# Patient Record
Sex: FEMALE | Race: WHITE | NOT HISPANIC OR LATINO | Employment: OTHER | URBAN - METROPOLITAN AREA
[De-identification: names, ages, dates, MRNs, and addresses within clinical notes are randomized per-mention and may not be internally consistent; named-entity substitution may affect disease eponyms.]

---

## 2017-04-19 ENCOUNTER — APPOINTMENT (EMERGENCY)
Dept: RADIOLOGY | Facility: HOSPITAL | Age: 77
End: 2017-04-19
Payer: MEDICARE

## 2017-04-19 ENCOUNTER — HOSPITAL ENCOUNTER (EMERGENCY)
Facility: HOSPITAL | Age: 77
Discharge: HOME | End: 2017-04-19
Admitting: EMERGENCY MEDICINE
Payer: MEDICARE

## 2017-04-19 VITALS
DIASTOLIC BLOOD PRESSURE: 109 MMHG | HEIGHT: 66 IN | HEART RATE: 85 BPM | WEIGHT: 192 LBS | SYSTOLIC BLOOD PRESSURE: 183 MMHG | BODY MASS INDEX: 30.86 KG/M2 | OXYGEN SATURATION: 100 % | TEMPERATURE: 97.5 F | RESPIRATION RATE: 15 BRPM

## 2017-04-19 DIAGNOSIS — S93.402A SPRAIN OF LEFT ANKLE: ICD-10-CM

## 2017-04-19 DIAGNOSIS — S92.252A: Primary | ICD-10-CM

## 2017-04-19 PROCEDURE — 99283 EMERGENCY DEPT VISIT LOW MDM: CPT

## 2017-04-19 PROCEDURE — 73610 X-RAY EXAM OF ANKLE: CPT

## 2017-04-19 RX ORDER — METOPROLOL TARTRATE 50 MG/1
50 TABLET, FILM COATED ORAL 2 TIMES DAILY
Status: ON HOLD | COMMUNITY
End: 2021-06-27 | Stop reason: ALTCHOICE

## 2017-04-19 RX ORDER — HYDROCODONE BITARTRATE AND ACETAMINOPHEN 5; 325 MG/1; MG/1
1 TABLET ORAL EVERY 4 HOURS PRN
Qty: 15 TABLET | Refills: 0 | Status: ON HOLD | OUTPATIENT
Start: 2017-04-19 | End: 2021-06-27 | Stop reason: ALTCHOICE

## 2017-04-20 ENCOUNTER — ALLSCRIPTS OFFICE VISIT (OUTPATIENT)
Dept: OTHER | Facility: OTHER | Age: 77
End: 2017-04-20

## 2017-05-02 ENCOUNTER — HOSPITAL ENCOUNTER (OUTPATIENT)
Dept: RADIOLOGY | Facility: CLINIC | Age: 77
Discharge: HOME | End: 2017-05-02
Payer: MEDICARE

## 2017-05-02 ENCOUNTER — ALLSCRIPTS OFFICE VISIT (OUTPATIENT)
Dept: OTHER | Facility: OTHER | Age: 77
End: 2017-05-02

## 2017-05-02 DIAGNOSIS — S92.255A CLOSED NONDISPLACED FRACTURE OF NAVICULAR BONE OF LEFT FOOT: ICD-10-CM

## 2017-05-02 PROCEDURE — 73630 X-RAY EXAM OF FOOT: CPT

## 2018-01-12 VITALS
BODY MASS INDEX: 32.14 KG/M2 | HEIGHT: 66 IN | HEART RATE: 86 BPM | SYSTOLIC BLOOD PRESSURE: 127 MMHG | DIASTOLIC BLOOD PRESSURE: 86 MMHG | WEIGHT: 200 LBS

## 2018-01-13 VITALS
DIASTOLIC BLOOD PRESSURE: 84 MMHG | HEART RATE: 96 BPM | BODY MASS INDEX: 31.34 KG/M2 | WEIGHT: 195 LBS | SYSTOLIC BLOOD PRESSURE: 125 MMHG | HEIGHT: 66 IN

## 2020-05-29 ENCOUNTER — APPOINTMENT (OUTPATIENT)
Dept: LAB | Facility: HOSPITAL | Age: 80
End: 2020-05-29
Attending: FAMILY MEDICINE
Payer: COMMERCIAL

## 2020-05-29 ENCOUNTER — TRANSCRIBE ORDERS (OUTPATIENT)
Dept: ADMINISTRATIVE | Facility: HOSPITAL | Age: 80
End: 2020-05-29

## 2020-05-29 DIAGNOSIS — Z13.9 SCREENING FOR UNSPECIFIED CONDITION: ICD-10-CM

## 2020-05-29 DIAGNOSIS — E13.69 OTHER SPECIFIED DIABETES MELLITUS WITH OTHER SPECIFIED COMPLICATION, UNSPECIFIED WHETHER LONG TERM INSULIN USE (HCC): ICD-10-CM

## 2020-05-29 DIAGNOSIS — E78.5 HYPERLIPIDEMIA, UNSPECIFIED HYPERLIPIDEMIA TYPE: ICD-10-CM

## 2020-05-29 DIAGNOSIS — I10 ESSENTIAL HYPERTENSION, MALIGNANT: ICD-10-CM

## 2020-05-29 DIAGNOSIS — I48.91 ATRIAL FIBRILLATION, UNSPECIFIED TYPE (HCC): ICD-10-CM

## 2020-05-29 DIAGNOSIS — E66.01 MORBID OBESITY (HCC): ICD-10-CM

## 2020-05-29 DIAGNOSIS — E13.69 OTHER SPECIFIED DIABETES MELLITUS WITH OTHER SPECIFIED COMPLICATION, UNSPECIFIED WHETHER LONG TERM INSULIN USE (HCC): Primary | ICD-10-CM

## 2020-05-29 LAB
ALBUMIN SERPL BCP-MCNC: 3.5 G/DL (ref 3.5–5)
ALP SERPL-CCNC: 36 U/L (ref 46–116)
ALT SERPL W P-5'-P-CCNC: 25 U/L (ref 12–78)
ANION GAP SERPL CALCULATED.3IONS-SCNC: 6 MMOL/L (ref 4–13)
AST SERPL W P-5'-P-CCNC: 12 U/L (ref 5–45)
BILIRUB SERPL-MCNC: 0.74 MG/DL (ref 0.2–1)
BUN SERPL-MCNC: 14 MG/DL (ref 5–25)
CALCIUM SERPL-MCNC: 8.7 MG/DL (ref 8.3–10.1)
CHLORIDE SERPL-SCNC: 105 MMOL/L (ref 100–108)
CHOLEST SERPL-MCNC: 176 MG/DL (ref 50–200)
CO2 SERPL-SCNC: 28 MMOL/L (ref 21–32)
CREAT SERPL-MCNC: 0.73 MG/DL (ref 0.6–1.3)
GFR SERPL CREATININE-BSD FRML MDRD: 78 ML/MIN/1.73SQ M
GLUCOSE P FAST SERPL-MCNC: 152 MG/DL (ref 65–99)
HDLC SERPL-MCNC: 41 MG/DL
LDLC SERPL CALC-MCNC: 114 MG/DL (ref 0–100)
NONHDLC SERPL-MCNC: 135 MG/DL
POTASSIUM SERPL-SCNC: 3.9 MMOL/L (ref 3.5–5.3)
PROT SERPL-MCNC: 6.6 G/DL (ref 6.4–8.2)
SODIUM SERPL-SCNC: 139 MMOL/L (ref 136–145)
TRIGL SERPL-MCNC: 104 MG/DL

## 2020-05-29 PROCEDURE — 83036 HEMOGLOBIN GLYCOSYLATED A1C: CPT

## 2020-05-29 PROCEDURE — 36415 COLL VENOUS BLD VENIPUNCTURE: CPT

## 2020-05-29 PROCEDURE — 80061 LIPID PANEL: CPT

## 2020-05-29 PROCEDURE — 80053 COMPREHEN METABOLIC PANEL: CPT

## 2020-05-30 LAB
EST. AVERAGE GLUCOSE BLD GHB EST-MCNC: 143 MG/DL
HBA1C MFR BLD: 6.6 %

## 2020-12-16 ENCOUNTER — TRANSCRIBE ORDERS (OUTPATIENT)
Dept: ADMINISTRATIVE | Facility: HOSPITAL | Age: 80
End: 2020-12-16

## 2020-12-16 ENCOUNTER — APPOINTMENT (OUTPATIENT)
Dept: LAB | Facility: CLINIC | Age: 80
End: 2020-12-16
Payer: COMMERCIAL

## 2020-12-16 DIAGNOSIS — E11.9 DIABETES MELLITUS, STABLE (HCC): ICD-10-CM

## 2020-12-16 DIAGNOSIS — E11.9 DIABETES MELLITUS, STABLE (HCC): Primary | ICD-10-CM

## 2020-12-16 DIAGNOSIS — E78.2 MIXED HYPERLIPIDEMIA: ICD-10-CM

## 2020-12-16 DIAGNOSIS — I48.91 ATRIAL FIBRILLATION, UNSPECIFIED TYPE (HCC): ICD-10-CM

## 2020-12-16 LAB
ALBUMIN SERPL BCP-MCNC: 3.7 G/DL (ref 3.5–5)
ALP SERPL-CCNC: 43 U/L (ref 46–116)
ALT SERPL W P-5'-P-CCNC: 30 U/L (ref 12–78)
ANION GAP SERPL CALCULATED.3IONS-SCNC: 6 MMOL/L (ref 4–13)
AST SERPL W P-5'-P-CCNC: 13 U/L (ref 5–45)
BILIRUB SERPL-MCNC: 0.69 MG/DL (ref 0.2–1)
BUN SERPL-MCNC: 12 MG/DL (ref 5–25)
CALCIUM SERPL-MCNC: 9.5 MG/DL (ref 8.3–10.1)
CHLORIDE SERPL-SCNC: 106 MMOL/L (ref 100–108)
CHOLEST SERPL-MCNC: 179 MG/DL (ref 50–200)
CO2 SERPL-SCNC: 28 MMOL/L (ref 21–32)
CREAT SERPL-MCNC: 0.77 MG/DL (ref 0.6–1.3)
CREAT UR-MCNC: 169 MG/DL
EST. AVERAGE GLUCOSE BLD GHB EST-MCNC: 160 MG/DL
GFR SERPL CREATININE-BSD FRML MDRD: 73 ML/MIN/1.73SQ M
GLUCOSE P FAST SERPL-MCNC: 190 MG/DL (ref 65–99)
HBA1C MFR BLD: 7.2 %
HDLC SERPL-MCNC: 41 MG/DL
LDLC SERPL CALC-MCNC: 113 MG/DL (ref 0–100)
MICROALBUMIN UR-MCNC: 17.4 MG/L (ref 0–20)
MICROALBUMIN/CREAT 24H UR: 10 MG/G CREATININE (ref 0–30)
NONHDLC SERPL-MCNC: 138 MG/DL
POTASSIUM SERPL-SCNC: 3.9 MMOL/L (ref 3.5–5.3)
PROT SERPL-MCNC: 6.8 G/DL (ref 6.4–8.2)
SODIUM SERPL-SCNC: 140 MMOL/L (ref 136–145)
TRIGL SERPL-MCNC: 123 MG/DL

## 2020-12-16 PROCEDURE — 36415 COLL VENOUS BLD VENIPUNCTURE: CPT

## 2020-12-16 PROCEDURE — 82043 UR ALBUMIN QUANTITATIVE: CPT | Performed by: FAMILY MEDICINE

## 2020-12-16 PROCEDURE — 80053 COMPREHEN METABOLIC PANEL: CPT

## 2020-12-16 PROCEDURE — 80061 LIPID PANEL: CPT

## 2020-12-16 PROCEDURE — 83036 HEMOGLOBIN GLYCOSYLATED A1C: CPT

## 2020-12-16 PROCEDURE — 82570 ASSAY OF URINE CREATININE: CPT | Performed by: FAMILY MEDICINE

## 2021-02-05 ENCOUNTER — IMMUNIZATIONS (OUTPATIENT)
Dept: FAMILY MEDICINE CLINIC | Facility: HOSPITAL | Age: 81
End: 2021-02-05

## 2021-02-05 DIAGNOSIS — Z23 ENCOUNTER FOR IMMUNIZATION: Primary | ICD-10-CM

## 2021-02-05 PROCEDURE — 0011A SARS-COV-2 / COVID-19 MRNA VACCINE (MODERNA) 100 MCG: CPT

## 2021-02-05 PROCEDURE — 91301 SARS-COV-2 / COVID-19 MRNA VACCINE (MODERNA) 100 MCG: CPT

## 2021-03-04 ENCOUNTER — IMMUNIZATIONS (OUTPATIENT)
Dept: FAMILY MEDICINE CLINIC | Facility: HOSPITAL | Age: 81
End: 2021-03-04

## 2021-03-04 DIAGNOSIS — Z23 ENCOUNTER FOR IMMUNIZATION: Primary | ICD-10-CM

## 2021-03-04 PROCEDURE — 91301 SARS-COV-2 / COVID-19 MRNA VACCINE (MODERNA) 100 MCG: CPT

## 2021-03-04 PROCEDURE — 0012A SARS-COV-2 / COVID-19 MRNA VACCINE (MODERNA) 100 MCG: CPT

## 2021-06-27 ENCOUNTER — HOSPITAL ENCOUNTER (OUTPATIENT)
Facility: HOSPITAL | Age: 81
Setting detail: OBSERVATION
Discharge: HOME | End: 2021-06-28
Attending: EMERGENCY MEDICINE | Admitting: INTERNAL MEDICINE
Payer: MEDICARE

## 2021-06-27 ENCOUNTER — APPOINTMENT (OUTPATIENT)
Dept: RADIOLOGY | Facility: HOSPITAL | Age: 81
End: 2021-06-27
Payer: MEDICARE

## 2021-06-27 DIAGNOSIS — R04.0 RIGHT-SIDED EPISTAXIS: ICD-10-CM

## 2021-06-27 DIAGNOSIS — I16.0 HYPERTENSIVE URGENCY: ICD-10-CM

## 2021-06-27 DIAGNOSIS — R04.0 EPISTAXIS: ICD-10-CM

## 2021-06-27 DIAGNOSIS — E11.9 TYPE 2 DIABETES MELLITUS (HCC): ICD-10-CM

## 2021-06-27 DIAGNOSIS — I48.91 ATRIAL FIBRILLATION WITH RAPID VENTRICULAR RESPONSE (HCC): Primary | ICD-10-CM

## 2021-06-27 LAB
ANION GAP SERPL CALCULATED.3IONS-SCNC: 11 MMOL/L (ref 4–13)
BACTERIA UR QL AUTO: NORMAL /HPF
BASOPHILS # BLD AUTO: 0.03 THOUSANDS/ΜL (ref 0–0.1)
BASOPHILS NFR BLD AUTO: 1 % (ref 0–1)
BILIRUB UR QL STRIP: NEGATIVE
BUN SERPL-MCNC: 18 MG/DL (ref 5–25)
CALCIUM SERPL-MCNC: 9 MG/DL (ref 8.3–10.1)
CHLORIDE SERPL-SCNC: 104 MMOL/L (ref 100–108)
CLARITY UR: CLEAR
CO2 SERPL-SCNC: 27 MMOL/L (ref 21–32)
COLOR UR: YELLOW
CREAT SERPL-MCNC: 0.84 MG/DL (ref 0.6–1.3)
EOSINOPHIL # BLD AUTO: 0.19 THOUSAND/ΜL (ref 0–0.61)
EOSINOPHIL NFR BLD AUTO: 4 % (ref 0–6)
ERYTHROCYTE [DISTWIDTH] IN BLOOD BY AUTOMATED COUNT: 12.9 % (ref 11.6–15.1)
GFR SERPL CREATININE-BSD FRML MDRD: 65 ML/MIN/1.73SQ M
GLUCOSE SERPL-MCNC: 315 MG/DL (ref 65–140)
GLUCOSE UR STRIP-MCNC: ABNORMAL MG/DL
HCT VFR BLD AUTO: 42.4 % (ref 34.8–46.1)
HGB BLD-MCNC: 14.4 G/DL (ref 11.5–15.4)
HGB UR QL STRIP.AUTO: NEGATIVE
IMM GRANULOCYTES # BLD AUTO: 0.02 THOUSAND/UL (ref 0–0.2)
IMM GRANULOCYTES NFR BLD AUTO: 0 % (ref 0–2)
INR PPP: 1.56 (ref 0.84–1.19)
KETONES UR STRIP-MCNC: ABNORMAL MG/DL
LEUKOCYTE ESTERASE UR QL STRIP: ABNORMAL
LYMPHOCYTES # BLD AUTO: 1.24 THOUSANDS/ΜL (ref 0.6–4.47)
LYMPHOCYTES NFR BLD AUTO: 24 % (ref 14–44)
MCH RBC QN AUTO: 30.3 PG (ref 26.8–34.3)
MCHC RBC AUTO-ENTMCNC: 34 G/DL (ref 31.4–37.4)
MCV RBC AUTO: 89 FL (ref 82–98)
MONOCYTES # BLD AUTO: 0.55 THOUSAND/ΜL (ref 0.17–1.22)
MONOCYTES NFR BLD AUTO: 11 % (ref 4–12)
NEUTROPHILS # BLD AUTO: 3.15 THOUSANDS/ΜL (ref 1.85–7.62)
NEUTS SEG NFR BLD AUTO: 60 % (ref 43–75)
NITRITE UR QL STRIP: NEGATIVE
NON-SQ EPI CELLS URNS QL MICRO: NORMAL /HPF
NRBC BLD AUTO-RTO: 0 /100 WBCS
PH UR STRIP.AUTO: 7 [PH]
PLATELET # BLD AUTO: 197 THOUSANDS/UL (ref 149–390)
PMV BLD AUTO: 9.9 FL (ref 8.9–12.7)
POTASSIUM SERPL-SCNC: 4 MMOL/L (ref 3.5–5.3)
PROT UR STRIP-MCNC: NEGATIVE MG/DL
PROTHROMBIN TIME: 18.6 SECONDS (ref 11.6–14.5)
RBC # BLD AUTO: 4.75 MILLION/UL (ref 3.81–5.12)
RBC #/AREA URNS AUTO: NORMAL /HPF
SODIUM SERPL-SCNC: 142 MMOL/L (ref 136–145)
SP GR UR STRIP.AUTO: 1.01 (ref 1–1.03)
TROPONIN I SERPL-MCNC: <0.02 NG/ML
TROPONIN I SERPL-MCNC: <0.02 NG/ML
TSH SERPL DL<=0.05 MIU/L-ACNC: 2.93 UIU/ML (ref 0.36–3.74)
UROBILINOGEN UR QL STRIP.AUTO: 0.2 E.U./DL
WBC # BLD AUTO: 5.18 THOUSAND/UL (ref 4.31–10.16)
WBC #/AREA URNS AUTO: NORMAL /HPF

## 2021-06-27 PROCEDURE — 85610 PROTHROMBIN TIME: CPT | Performed by: EMERGENCY MEDICINE

## 2021-06-27 PROCEDURE — 93005 ELECTROCARDIOGRAM TRACING: CPT

## 2021-06-27 PROCEDURE — 36415 COLL VENOUS BLD VENIPUNCTURE: CPT | Performed by: EMERGENCY MEDICINE

## 2021-06-27 PROCEDURE — 94664 DEMO&/EVAL PT USE INHALER: CPT

## 2021-06-27 PROCEDURE — 84484 ASSAY OF TROPONIN QUANT: CPT | Performed by: EMERGENCY MEDICINE

## 2021-06-27 PROCEDURE — 99284 EMERGENCY DEPT VISIT MOD MDM: CPT

## 2021-06-27 PROCEDURE — 84443 ASSAY THYROID STIM HORMONE: CPT | Performed by: INTERNAL MEDICINE

## 2021-06-27 PROCEDURE — 81001 URINALYSIS AUTO W/SCOPE: CPT | Performed by: INTERNAL MEDICINE

## 2021-06-27 PROCEDURE — 94760 N-INVAS EAR/PLS OXIMETRY 1: CPT

## 2021-06-27 PROCEDURE — 99222 1ST HOSP IP/OBS MODERATE 55: CPT | Performed by: INTERNAL MEDICINE

## 2021-06-27 PROCEDURE — 80048 BASIC METABOLIC PNL TOTAL CA: CPT | Performed by: EMERGENCY MEDICINE

## 2021-06-27 PROCEDURE — 85025 COMPLETE CBC W/AUTO DIFF WBC: CPT | Performed by: EMERGENCY MEDICINE

## 2021-06-27 PROCEDURE — 71045 X-RAY EXAM CHEST 1 VIEW: CPT

## 2021-06-27 PROCEDURE — 99220 PR INITIAL OBSERVATION CARE/DAY 70 MINUTES: CPT | Performed by: INTERNAL MEDICINE

## 2021-06-27 PROCEDURE — 84484 ASSAY OF TROPONIN QUANT: CPT | Performed by: INTERNAL MEDICINE

## 2021-06-27 PROCEDURE — 96374 THER/PROPH/DIAG INJ IV PUSH: CPT

## 2021-06-27 RX ORDER — LABETALOL 20 MG/4 ML (5 MG/ML) INTRAVENOUS SYRINGE
10 EVERY 6 HOURS PRN
Status: DISCONTINUED | OUTPATIENT
Start: 2021-06-27 | End: 2021-06-28 | Stop reason: HOSPADM

## 2021-06-27 RX ORDER — ONDANSETRON 2 MG/ML
4 INJECTION INTRAMUSCULAR; INTRAVENOUS EVERY 6 HOURS PRN
Status: DISCONTINUED | OUTPATIENT
Start: 2021-06-27 | End: 2021-06-28 | Stop reason: HOSPADM

## 2021-06-27 RX ORDER — LISINOPRIL 10 MG/1
10 TABLET ORAL DAILY
Status: DISCONTINUED | OUTPATIENT
Start: 2021-06-27 | End: 2021-06-28 | Stop reason: HOSPADM

## 2021-06-27 RX ORDER — TRANEXAMIC ACID 100 MG/ML
1000 INJECTION, SOLUTION INTRAVENOUS ONCE
Status: COMPLETED | OUTPATIENT
Start: 2021-06-27 | End: 2021-06-27

## 2021-06-27 RX ORDER — DILTIAZEM HYDROCHLORIDE 5 MG/ML
20 INJECTION INTRAVENOUS ONCE
Status: COMPLETED | OUTPATIENT
Start: 2021-06-27 | End: 2021-06-27

## 2021-06-27 RX ORDER — SOTALOL HYDROCHLORIDE 80 MG/1
40 TABLET ORAL 2 TIMES DAILY
Status: DISCONTINUED | OUTPATIENT
Start: 2021-06-27 | End: 2021-06-27

## 2021-06-27 RX ORDER — HYDROCHLOROTHIAZIDE 12.5 MG/1
12.5 TABLET ORAL DAILY
COMMUNITY
End: 2021-09-08

## 2021-06-27 RX ORDER — SOTALOL HYDROCHLORIDE 80 MG/1
80 TABLET ORAL 2 TIMES DAILY
Status: DISCONTINUED | OUTPATIENT
Start: 2021-06-27 | End: 2021-06-28 | Stop reason: HOSPADM

## 2021-06-27 RX ORDER — OXYMETAZOLINE HYDROCHLORIDE 0.05 G/100ML
2 SPRAY NASAL ONCE
Status: COMPLETED | OUTPATIENT
Start: 2021-06-27 | End: 2021-06-27

## 2021-06-27 RX ORDER — LISINOPRIL 20 MG/1
20 TABLET ORAL DAILY
COMMUNITY
End: 2021-11-05 | Stop reason: SDUPTHER

## 2021-06-27 RX ORDER — ACETAMINOPHEN 325 MG/1
650 TABLET ORAL EVERY 6 HOURS PRN
Status: DISCONTINUED | OUTPATIENT
Start: 2021-06-27 | End: 2021-06-28 | Stop reason: HOSPADM

## 2021-06-27 RX ORDER — LIDOCAINE HYDROCHLORIDE AND EPINEPHRINE 10; 10 MG/ML; UG/ML
20 INJECTION, SOLUTION INFILTRATION; PERINEURAL ONCE
Status: COMPLETED | OUTPATIENT
Start: 2021-06-27 | End: 2021-06-27

## 2021-06-27 RX ORDER — SOTALOL HYDROCHLORIDE 80 MG/1
40 TABLET ORAL 2 TIMES DAILY
Status: ON HOLD | COMMUNITY
End: 2021-06-28 | Stop reason: SDUPTHER

## 2021-06-27 RX ORDER — HYDROCHLOROTHIAZIDE 12.5 MG/1
12.5 TABLET ORAL DAILY
Status: DISCONTINUED | OUTPATIENT
Start: 2021-06-27 | End: 2021-06-28 | Stop reason: HOSPADM

## 2021-06-27 RX ADMIN — LABETALOL 20 MG/4 ML (5 MG/ML) INTRAVENOUS SYRINGE 10 MG: at 23:47

## 2021-06-27 RX ADMIN — DILTIAZEM HYDROCHLORIDE 30 MG: 30 TABLET, FILM COATED ORAL at 14:38

## 2021-06-27 RX ADMIN — SOTALOL HYDROCHLORIDE 80 MG: 80 TABLET ORAL at 18:05

## 2021-06-27 RX ADMIN — SOTALOL HYDROCHLORIDE 40 MG: 80 TABLET ORAL at 11:36

## 2021-06-27 RX ADMIN — DILTIAZEM HYDROCHLORIDE 30 MG: 30 TABLET, FILM COATED ORAL at 18:05

## 2021-06-27 RX ADMIN — DILTIAZEM HYDROCHLORIDE 20 MG: 5 INJECTION INTRAVENOUS at 08:21

## 2021-06-27 RX ADMIN — TRANEXAMIC ACID 1000 MG: 1 INJECTION, SOLUTION INTRAVENOUS at 07:46

## 2021-06-27 RX ADMIN — DILTIAZEM HYDROCHLORIDE 30 MG: 30 TABLET, FILM COATED ORAL at 23:40

## 2021-06-27 RX ADMIN — LISINOPRIL 10 MG: 10 TABLET ORAL at 11:36

## 2021-06-27 RX ADMIN — OXYMETAZOLINE HYDROCHLORIDE 2 SPRAY: 0.05 SPRAY NASAL at 07:46

## 2021-06-27 RX ADMIN — HYDROCHLOROTHIAZIDE 12.5 MG: 12.5 TABLET ORAL at 11:36

## 2021-06-27 RX ADMIN — LABETALOL 20 MG/4 ML (5 MG/ML) INTRAVENOUS SYRINGE 10 MG: at 11:36

## 2021-06-27 RX ADMIN — LIDOCAINE HYDROCHLORIDE,EPINEPHRINE BITARTRATE 20 ML: 10; .01 INJECTION, SOLUTION INFILTRATION; PERINEURAL at 07:46

## 2021-06-27 NOTE — ASSESSMENT & PLAN NOTE
Patient admitted with atrial fibrillation with RVR  Patient has history of AFib and is on Xarelto and sotalol at home    States she had 3 previous cardioversions done  Follows up with [facility] group  Will start on Cardizem 30 mg p o  Q 6 hours  Patient states she did not tolerate Lopressor in the past  Continue on sotalol  Monitor on telemetry  Troponins are negative x2  Hold Xarelto in the setting of epistaxis  Cardiology consult

## 2021-06-27 NOTE — ASSESSMENT & PLAN NOTE
Continue on Zestril, hydrochlorothiazide  Started on Cardizem  Labetalol p r n    Monitor vitals as per protocol

## 2021-06-27 NOTE — ASSESSMENT & PLAN NOTE
Patient admitted with hypertensive urgency with blood pressure of 203/133  Patient was given IV Cardizem in ER  Will continue on Zestril, hydrochlorothiazide  Patient is started on Cardizem 30 mg p o  Q 6 hours  Labetalol p r n    Monitor vitals as per protocol

## 2021-06-27 NOTE — H&P
[place]  H&P- Jarad Bartlett 1940, 79 y o  female MRN: [id number]  Unit/Bed#: -01 Encounter: [id number]  Primary Care Provider: Francis Chavarria MD   Date and time admitted to hospital: 6/27/2021  7:42 AM    * Atrial fibrillation with rapid ventricular response [facility])  Assessment & Plan  Patient admitted with atrial fibrillation with RVR  Patient has history of AFib and is on Xarelto and sotalol at home  States she had 3 previous cardioversions done  Follows up with [facility] group  Will start on Cardizem 30 mg p o  Q 6 hours  Patient states she did not tolerate Lopressor in the past  Continue on sotalol  Monitor on telemetry  Troponins are negative x2  Hold Xarelto in the setting of epistaxis  Cardiology consult    Hypertensive urgency  Assessment & Plan  Patient admitted with hypertensive urgency with blood pressure of 203/133  Patient was given IV Cardizem in ER  Will continue on Zestril, hydrochlorothiazide  Patient is started on Cardizem 30 mg p o  Q 6 hours  Labetalol p r n  Monitor vitals as per protocol    Epistaxis  Assessment & Plan  Patient had right-sided nose bleed which was packed in ER  Bleeding has subsided  Hold Xarelto  Monitor for any further active bleeding  Outpatient follow-up with ENT      Hypertension  Assessment & Plan  Continue on Zestril, hydrochlorothiazide  Started on Cardizem  Labetalol p r n  Monitor vitals as per protocol  Anticipated length of stay less than 2 midnights    Chief Complaint   Patient presents with    Nose Bleed     pt repotrs a nose bleed that started around 0700 today  reports that this has happened before  +thinners         HPI:  Jarad Bartlett is a 79 y o  female history of AFib on Xarelto, hypertension, epistaxis who presented to the emergency department with nose bleed  Patient was found to have AFib with RVR in ER and hypertensive urgency    Patient was given Cardizem 20 mg IV 1 time and had right-sided nasal packing done by ER physician  Patient states she does not have any palpitation, shortness of breath, chest pain, nausea, vomiting, lightheadedness, headache, abdominal pain  Does states that she feels weak  Patient has history of AFib and had cardioversion x3  Her last cardioversion was about 2 years ago and since then she has been on sotalol  Patient follows up with [facility] as outpatient  Historical Information   Past Medical History:   Diagnosis Date    A-fib ([address] )     Epistaxis     Hypertension     Sleep apnea      Past Surgical History:   Procedure Laterality Date    HYSTERECTOMY       Social History   Social History     Substance and Sexual Activity   Alcohol Use Yes    Comment: social     Social History     Substance and Sexual Activity   Drug Use No     Social History     Tobacco Use   Smoking Status Never Smoker   Smokeless Tobacco Never Used     History reviewed  No pertinent family history      Meds/Allergies   Allergies   Allergen Reactions    Codeine     Sulfa Antibiotics        Meds:    Current Facility-Administered Medications:     acetaminophen (TYLENOL) tablet 650 mg, 650 mg, Oral, Q6H PRN, Travon Santamaria MD    diltiazem (CARDIZEM) tablet 30 mg, 30 mg, Oral, Q6H [facility], Travon Santamaria MD    hydrochlorothiazide (HYDRODIURIL) tablet 12 5 mg, 12 5 mg, Oral, Daily, Travon Santamaria MD, 12 5 mg at 06/27/21 1136    Labetalol HCl (NORMODYNE) injection 10 mg, 10 mg, Intravenous, Q6H PRN, Travon Santamaria MD, 10 mg at 06/27/21 1136    lisinopril (ZESTRIL) tablet 10 mg, 10 mg, Oral, Daily, Travon Santamaria MD, 10 mg at 06/27/21 1136    ondansetron (ZOFRAN) injection 4 mg, 4 mg, Intravenous, Q6H PRN, Travon Santamaria MD    sotalol (BETAPACE) tablet 40 mg, 40 mg, Oral, BID, Travon Santamaria MD, 40 mg at 06/27/21 1136    Medications Prior to Admission   Medication    hydrochlorothiazide (HYDRODIURIL) 12 5 mg tablet    lisinopril (ZESTRIL) 10 mg tablet  sotalol (BETAPACE) 80 mg tablet    rivaroxaban (XARELTO) 20 mg tablet         Review of Systems   Constitutional: Positive for activity change and fatigue  HENT: Positive for nosebleeds  Eyes: Negative  Respiratory: Negative  Cardiovascular: Negative  Gastrointestinal: Negative  Endocrine: Negative  Genitourinary: Negative  Musculoskeletal: Negative  Skin: Negative  Allergic/Immunologic: Negative  Neurological: Negative  Hematological: Negative  Psychiatric/Behavioral: Negative  Current Vitals:   Blood Pressure: 170/85 (06/27/21 1309)  Pulse: (!) 110 (06/27/21 1309)  Temperature: 98 2 °F (36 8 °C) (06/27/21 1007)  Temp Source: Temporal (06/27/21 0744)  Respirations: 18 (06/27/21 1007)  Height: 5' 7" (170 2 cm) (06/27/21 0744)  Weight - Scale: 87 5 kg (192 lb 14 4 oz) (06/27/21 1131)  SpO2: 94 % (06/27/21 1309)  SPO2 RA Rest      ED to Hosp-Admission (Current) from 6/27/2021 in [address] Med Surg Unit   SpO2  94 %   SpO2 Activity  At Rest   O2 Device  None (Room air)   O2 Flow Rate  --          Intake/Output Summary (Last 24 hours) at 6/27/2021 1340  Last data filed at 6/27/2021 1301  Gross per 24 hour   Intake 520 ml   Output 100 ml   Net 420 ml     Body mass index is 30 21 kg/m²  Physical Exam  Vitals and nursing note reviewed  Constitutional:       General: She is not in acute distress  Appearance: She is well-developed  HENT:      Head: Normocephalic and atraumatic  Nose: Nose normal    Eyes:      General: No scleral icterus  Conjunctiva/sclera: Conjunctivae normal       Pupils: Pupils are equal, round, and reactive to light  Neck:      Thyroid: No thyromegaly  Vascular: No JVD  Trachea: No tracheal deviation  Cardiovascular:      Rate and Rhythm: Normal rate  Rhythm irregular  Heart sounds: Normal heart sounds  Pulmonary:      Effort: Pulmonary effort is normal  No respiratory distress        Breath sounds: Normal breath sounds  No wheezing or rales  Chest:      Chest wall: No tenderness  Abdominal:      General: Bowel sounds are normal  There is no distension  Palpations: Abdomen is soft  There is no mass  Tenderness: There is no abdominal tenderness  There is no guarding or rebound  Musculoskeletal:         General: No tenderness or deformity  Normal range of motion  Cervical back: Normal range of motion and neck supple  Lymphadenopathy:      Cervical: No cervical adenopathy  Skin:     General: Skin is warm  Coloration: Skin is not pale  Findings: No erythema or rash  Neurological:      General: No focal deficit present  Mental Status: She is alert and oriented to person, place, and time  Cranial Nerves: No cranial nerve deficit  Coordination: Coordination normal    Psychiatric:         Behavior: Behavior normal          Thought Content:  Thought content normal          Judgment: Judgment normal          Lab Results:   CBC:   Lab Results   Component Value Date    WBC 5 18 06/27/2021    HGB 14 4 06/27/2021    HCT 42 4 06/27/2021    MCV 89 06/27/2021     06/27/2021    MCH 30 3 06/27/2021    MCHC 34 0 06/27/2021    RDW 12 9 06/27/2021    MPV 9 9 06/27/2021    NRBC 0 06/27/2021     CMP:  Lab Results   Component Value Date     06/27/2021    CO2 27 06/27/2021    BUN 18 06/27/2021    CREATININE 0 84 06/27/2021    CALCIUM 9 0 06/27/2021    AST 13 12/16/2020    ALT 30 12/16/2020    ALKPHOS 43 (L) 12/16/2020    EGFR 65 06/27/2021     Lab Results   Component Value Date    TROPONINI <0 02 06/27/2021     Coagulation:   Lab Results   Component Value Date    INR 1 56 (H) 06/27/2021    Urinalysis:  Lab Results   Component Value Date    COLORU Yellow 06/27/2021    CLARITYU Clear 06/27/2021    SPECGRAV 1 010 06/27/2021    PHUR 7 0 06/27/2021    LEUKOCYTESUR Trace (A) 06/27/2021    NITRITE Negative 06/27/2021    GLUCOSEU >=1000 (1%) (A) 06/27/2021    KETONESU 15 (1+) (A) 06/27/2021    BILIRUBINUR Negative 06/27/2021    BLOODU Negative 06/27/2021      Amylase: No results found for: AMYLASE  Lipase: No results found for: LIPASE     Imaging: No results found  EKG, Pathology, and Other Studies: I have personally reviewed the results  VTE Pharmacologic Prophylaxis: Reason for no pharmacologic prophylaxis Epistaxis  VTE Mechanical Prophylaxis: sequential compression device    Code Status: Level 1 - Full Code    Counseling / Coordination of Care  Total floor / unit time spent today 75 minutes  Greater than 50% of total time was spent with the patient and / or family counseling and / or coordination of care       "This note has been constructed using a voice recognition system"      Genevieve Huang MD  6/27/2021, 1:40 PM

## 2021-06-27 NOTE — ASSESSMENT & PLAN NOTE
Patient had right-sided nose bleed which was packed in ER  Bleeding has subsided  Hold Xarelto  Monitor for any further active bleeding  Outpatient follow-up with ENT

## 2021-06-27 NOTE — RESPIRATORY THERAPY NOTE
RT Protocol Note  Starlette Ped 79 y o  female MRN: [id number]  Unit/Bed#: MS 36-1 Encounter: [id number]    Assessment    Principal Problem:    Atrial fibrillation with rapid ventricular response (HCC)  Active Problems:    Epistaxis    Hypertensive urgency    Hypertension      Home Pulmonary Medications:  none       Past Medical History:   Diagnosis Date    [address] [address]     Epistaxis     Hypertension     Sleep apnea      Social History     Socioeconomic History    Marital status: /Civil Union     Spouse name: None    Number of children: None    Years of education: None    Highest education level: None   Occupational History    None   Tobacco Use    Smoking status: Never Smoker    Smokeless tobacco: Never Used   Substance and Sexual Activity    Alcohol use: Yes     Comment: social    Drug use: No    Sexual activity: None   Other Topics Concern    None   Social History Narrative    None     Social Determinants of Health     Financial Resource Strain:     Difficulty of Paying Living Expenses:    Food Insecurity:     Worried About Running Out of Food in the Last Year:     Ran Out of Food in the Last Year:    Transportation Needs:     Lack of Transportation (Medical):      Lack of Transportation (Non-Medical):    Physical Activity:     Days of Exercise per Week:     Minutes of Exercise per Session:    Stress:     Feeling of Stress :    Social Connections:     Frequency of Communication with Friends and Family:     Frequency of Social Gatherings with Friends and Family:     Attends [employer] Services:     Active Member of Clubs or Organizations:     Attends Club or Organization Meetings:     Marital Status:    Intimate Partner Violence:     Fear of Current or Ex-Partner:     Emotionally Abused:     Physically Abused:     Sexually Abused:        Subjective         Objective    Physical Exam:   Assessment Type: Assess only  General Appearance: Awake, Alert  Respiratory Pattern: Normal  Chest Assessment: Chest expansion symmetrical  Bilateral Breath Sounds: Clear  Cough: Non-productive    Vitals:  Blood pressure 170/85, pulse (!) 110, temperature 98 2 °F (36 8 °C), resp  rate 18, height 5' 7" (1 702 m), weight 87 5 kg (192 lb 14 4 oz), SpO2 94 %  Imaging and other studies: I have personally reviewed pertinent reports              Plan    Respiratory Plan: No distress/Pulmonary history, Discontinue Protocol        Resp Comments: D/C protocol stretcher

## 2021-06-27 NOTE — CONSULTS
Consultation - Cardiology   Amber Stephen 79 y o  female MRN: [id number]  Unit/Bed#: -01 Encounter: [id number]    Assessment/Plan     Assessment:    Persistent Atrial Fibrillation      Plan:    She is currently in controlled AF  We will increase her sotalol to 80mg PO BID  WIll follow on telemetry  She is currently off anticoagulation due to epistaxis  Ultimately we can consider DEBORAH guided cardioversion once her epistaxis has resolved  We can do this prior to discharge or she can just followup with her cardiologist        History of Present Illness   Physician Requesting Consult: Charanjit Calderon MD  Reason for Consult / Principal Problem: Atrial Fibrillation  HPI: Amber Stephen is a 79y o  year old female who presents with epistaxis  SHe is on xarelto for persistent atrial fibrillation as well as sotalol  On presentation she was found to be in atrial fibrillation with RVR  She was given IV cardizem in the ER  She states she sometimes has throat discomfort that may be related to atrial fibrillation  She is currently asymptomatic at rest  She a history of prior cardioversion as well  Inpatient consult to Cardiology  Consult performed by: Jeanne Abraham MD  Consult ordered by: Charanjit Calderon MD          Review of Systems   Constitutional: Positive for fatigue  Negative for chills and fever  HENT: Positive for nosebleeds  Negative for ear pain and sore throat  Eyes: Negative for pain and visual disturbance  Respiratory: Negative for cough and shortness of breath  Cardiovascular: Negative for chest pain and palpitations  Gastrointestinal: Negative for abdominal pain and vomiting  Endocrine: Negative  Genitourinary: Negative for dysuria and hematuria  Musculoskeletal: Negative for arthralgias and back pain  Skin: Negative for color change and rash  Allergic/Immunologic: Negative  Neurological: Negative  Negative for seizures and syncope  Hematological: Negative  Psychiatric/Behavioral: Negative  All other systems reviewed and are negative  Historical Information   Past Medical History:   Diagnosis Date    A-fib ([address] )     Epistaxis     Hypertension     Sleep apnea      Past Surgical History:   Procedure Laterality Date    HYSTERECTOMY       Social History     Substance and Sexual Activity   Alcohol Use Yes    Comment: social     Social History     Substance and Sexual Activity   Drug Use No     E-Cigarette/Vaping     E-Cigarette/Vaping Substances     Social History     Tobacco Use   Smoking Status Never Smoker   Smokeless Tobacco Never Used     Family History: History reviewed  No pertinent family history  Meds/Allergies   current meds:   Current Facility-Administered Medications   Medication Dose Route Frequency    acetaminophen (TYLENOL) tablet 650 mg  650 mg Oral Q6H PRN    diltiazem (CARDIZEM) tablet 30 mg  30 mg Oral Q6H [address]    hydrochlorothiazide (HYDRODIURIL) tablet 12 5 mg  12 5 mg Oral Daily    Labetalol HCl (NORMODYNE) injection 10 mg  10 mg Intravenous Q6H PRN    lisinopril (ZESTRIL) tablet 10 mg  10 mg Oral Daily    ondansetron (ZOFRAN) injection 4 mg  4 mg Intravenous Q6H PRN    sotalol (BETAPACE) tablet 80 mg  80 mg Oral BID     Allergies   Allergen Reactions    Codeine     Sulfa Antibiotics        Objective   Vitals: Blood pressure 161/88, pulse 87, temperature 98 3 °F (36 8 °C), resp  rate 17, height 5' 7" (1 702 m), weight 87 5 kg (192 lb 14 4 oz), SpO2 95 %    Orthostatic Blood Pressures      Most Recent Value   Blood Pressure  161/88 filed at 06/27/2021 1426   Patient Position - Orthostatic VS  Lying filed at 06/27/2021 0915            Intake/Output Summary (Last 24 hours) at 6/27/2021 1557  Last data filed at 6/27/2021 1301  Gross per 24 hour   Intake 520 ml   Output 100 ml   Net 420 ml       Invasive Devices     Peripheral Intravenous Line            Peripheral IV 06/27/21 Right Antecubital <1 day                Physical Exam  Vitals and nursing note reviewed  Constitutional:       General: She is not in acute distress  Appearance: She is well-developed  HENT:      Head: Normocephalic and atraumatic  Eyes:      Conjunctiva/sclera: Conjunctivae normal    Cardiovascular:      Rate and Rhythm: Normal rate and regular rhythm  Heart sounds: No murmur heard  Pulmonary:      Effort: Pulmonary effort is normal  No respiratory distress  Breath sounds: Normal breath sounds  Abdominal:      Palpations: Abdomen is soft  Tenderness: There is no abdominal tenderness  Musculoskeletal:      Cervical back: Neck supple  Skin:     General: Skin is warm and dry  Neurological:      Mental Status: She is alert  Lab Results:   I have personally reviewed pertinent lab results      CBC with diff:   Results from last 7 days   Lab Units 06/27/21  0817   WBC Thousand/uL 5 18   RBC Million/uL 4 75   HEMOGLOBIN g/dL 14 4   HEMATOCRIT % 42 4   MCV fL 89   MCH pg 30 3   MCHC g/dL 34 0   RDW % 12 9   MPV fL 9 9   PLATELETS Thousands/uL 197     CMP:   Results from last 7 days   Lab Units 06/27/21  0817   SODIUM mmol/L 142   POTASSIUM mmol/L 4 0   CHLORIDE mmol/L 104   CO2 mmol/L 27   BUN mg/dL 18   CREATININE mg/dL 0 84   CALCIUM mg/dL 9 0   EGFR ml/min/1 73sq m 65     Troponin:   0   Lab Value Date/Time    TROPONINI <0 02 06/27/2021 1149    TROPONINI <0 02 06/27/2021 0817     BNP:   Results from last 7 days   Lab Units 06/27/21  0817   POTASSIUM mmol/L 4 0   CHLORIDE mmol/L 104   CO2 mmol/L 27   BUN mg/dL 18   CREATININE mg/dL 0 84   CALCIUM mg/dL 9 0   EGFR ml/min/1 73sq m 65     Coags:   Results from last 7 days   Lab Units 06/27/21  0817   INR  1 56*     TSH:   Results from last 7 days   Lab Units 06/27/21  0817   TSH 3RD GENERATON uIU/mL 2 930     Magnesium:     Lipid Profile:     Imaging: I have personally reviewed pertinent films in PACS  EKG: Atrial Fibrillation       Code Status: Level 1 - Full Code  Advance Directive and Living Will:      Power of :    POLST:      Counseling / Coordination of Care  Total floor / unit time spent today 45 minutes  Greater than 50% of total time was spent with the patient and / or family counseling and / or coordination of care  A description of the counseling / coordination of care

## 2021-06-28 VITALS
HEIGHT: 67 IN | WEIGHT: 189.82 LBS | TEMPERATURE: 97.5 F | HEART RATE: 85 BPM | OXYGEN SATURATION: 97 % | DIASTOLIC BLOOD PRESSURE: 71 MMHG | BODY MASS INDEX: 29.79 KG/M2 | RESPIRATION RATE: 17 BRPM | SYSTOLIC BLOOD PRESSURE: 117 MMHG

## 2021-06-28 PROBLEM — I16.0 HYPERTENSIVE URGENCY: Status: RESOLVED | Noted: 2021-06-27 | Resolved: 2021-06-28

## 2021-06-28 LAB
ALBUMIN SERPL BCP-MCNC: 3 G/DL (ref 3.5–5)
ALP SERPL-CCNC: 38 U/L (ref 46–116)
ALT SERPL W P-5'-P-CCNC: 30 U/L (ref 12–78)
ANION GAP SERPL CALCULATED.3IONS-SCNC: 8 MMOL/L (ref 4–13)
AST SERPL W P-5'-P-CCNC: 18 U/L (ref 5–45)
ATRIAL RATE: 117 BPM
ATRIAL RATE: 70 BPM
BASOPHILS # BLD AUTO: 0.04 THOUSANDS/ΜL (ref 0–0.1)
BASOPHILS NFR BLD AUTO: 1 % (ref 0–1)
BILIRUB SERPL-MCNC: 0.7 MG/DL (ref 0.2–1)
BUN SERPL-MCNC: 13 MG/DL (ref 5–25)
CALCIUM ALBUM COR SERPL-MCNC: 9.5 MG/DL (ref 8.3–10.1)
CALCIUM SERPL-MCNC: 8.7 MG/DL (ref 8.3–10.1)
CHLORIDE SERPL-SCNC: 104 MMOL/L (ref 100–108)
CO2 SERPL-SCNC: 27 MMOL/L (ref 21–32)
CREAT SERPL-MCNC: 0.77 MG/DL (ref 0.6–1.3)
EOSINOPHIL # BLD AUTO: 0.13 THOUSAND/ΜL (ref 0–0.61)
EOSINOPHIL NFR BLD AUTO: 2 % (ref 0–6)
ERYTHROCYTE [DISTWIDTH] IN BLOOD BY AUTOMATED COUNT: 13.1 % (ref 11.6–15.1)
EST. AVERAGE GLUCOSE BLD GHB EST-MCNC: 226 MG/DL
GFR SERPL CREATININE-BSD FRML MDRD: 73 ML/MIN/1.73SQ M
GLUCOSE SERPL-MCNC: 234 MG/DL (ref 65–140)
HBA1C MFR BLD: 9.5 %
HCT VFR BLD AUTO: 42 % (ref 34.8–46.1)
HGB BLD-MCNC: 14 G/DL (ref 11.5–15.4)
IMM GRANULOCYTES # BLD AUTO: 0.02 THOUSAND/UL (ref 0–0.2)
IMM GRANULOCYTES NFR BLD AUTO: 0 % (ref 0–2)
LYMPHOCYTES # BLD AUTO: 1.69 THOUSANDS/ΜL (ref 0.6–4.47)
LYMPHOCYTES NFR BLD AUTO: 23 % (ref 14–44)
MAGNESIUM SERPL-MCNC: 1.7 MG/DL (ref 1.6–2.6)
MCH RBC QN AUTO: 29.9 PG (ref 26.8–34.3)
MCHC RBC AUTO-ENTMCNC: 33.3 G/DL (ref 31.4–37.4)
MCV RBC AUTO: 90 FL (ref 82–98)
MONOCYTES # BLD AUTO: 0.84 THOUSAND/ΜL (ref 0.17–1.22)
MONOCYTES NFR BLD AUTO: 12 % (ref 4–12)
NEUTROPHILS # BLD AUTO: 4.49 THOUSANDS/ΜL (ref 1.85–7.62)
NEUTS SEG NFR BLD AUTO: 62 % (ref 43–75)
NRBC BLD AUTO-RTO: 0 /100 WBCS
PHOSPHATE SERPL-MCNC: 3.6 MG/DL (ref 2.3–4.1)
PLATELET # BLD AUTO: 194 THOUSANDS/UL (ref 149–390)
PMV BLD AUTO: 9.9 FL (ref 8.9–12.7)
POTASSIUM SERPL-SCNC: 3.6 MMOL/L (ref 3.5–5.3)
PROT SERPL-MCNC: 6.2 G/DL (ref 6.4–8.2)
QRS AXIS: 31 DEGREES
QRS AXIS: 50 DEGREES
QRSD INTERVAL: 78 MS
QRSD INTERVAL: 84 MS
QT INTERVAL: 350 MS
QT INTERVAL: 432 MS
QTC INTERVAL: 477 MS
QTC INTERVAL: 510 MS
RBC # BLD AUTO: 4.68 MILLION/UL (ref 3.81–5.12)
SODIUM SERPL-SCNC: 139 MMOL/L (ref 136–145)
T WAVE AXIS: 18 DEGREES
T WAVE AXIS: 8 DEGREES
VENTRICULAR RATE: 112 BPM
VENTRICULAR RATE: 84 BPM
WBC # BLD AUTO: 7.21 THOUSAND/UL (ref 4.31–10.16)

## 2021-06-28 PROCEDURE — RECHECK: Performed by: PHYSICIAN ASSISTANT

## 2021-06-28 PROCEDURE — NC001 PR NO CHARGE: Performed by: INTERNAL MEDICINE

## 2021-06-28 PROCEDURE — 83036 HEMOGLOBIN GLYCOSYLATED A1C: CPT

## 2021-06-28 PROCEDURE — 83735 ASSAY OF MAGNESIUM: CPT

## 2021-06-28 PROCEDURE — 99291 CRITICAL CARE FIRST HOUR: CPT | Performed by: EMERGENCY MEDICINE

## 2021-06-28 PROCEDURE — 85025 COMPLETE CBC W/AUTO DIFF WBC: CPT

## 2021-06-28 PROCEDURE — 99217 PR OBSERVATION CARE DISCHARGE MANAGEMENT: CPT | Performed by: PHYSICIAN ASSISTANT

## 2021-06-28 PROCEDURE — 93005 ELECTROCARDIOGRAM TRACING: CPT

## 2021-06-28 PROCEDURE — 84100 ASSAY OF PHOSPHORUS: CPT

## 2021-06-28 PROCEDURE — 97162 PT EVAL MOD COMPLEX 30 MIN: CPT

## 2021-06-28 PROCEDURE — 80053 COMPREHEN METABOLIC PANEL: CPT

## 2021-06-28 PROCEDURE — 99213 OFFICE O/P EST LOW 20 MIN: CPT | Performed by: INTERNAL MEDICINE

## 2021-06-28 PROCEDURE — 93010 ELECTROCARDIOGRAM REPORT: CPT | Performed by: INTERNAL MEDICINE

## 2021-06-28 PROCEDURE — 30901 CONTROL OF NOSEBLEED: CPT | Performed by: EMERGENCY MEDICINE

## 2021-06-28 RX ORDER — DILTIAZEM HYDROCHLORIDE 120 MG/1
120 CAPSULE, COATED, EXTENDED RELEASE ORAL DAILY
Qty: 30 CAPSULE | Refills: 0 | Status: SHIPPED | OUTPATIENT
Start: 2021-06-29 | End: 2021-06-28

## 2021-06-28 RX ORDER — CEPHALEXIN 500 MG/1
500 CAPSULE ORAL EVERY 12 HOURS SCHEDULED
Qty: 8 CAPSULE | Refills: 0 | Status: SHIPPED | OUTPATIENT
Start: 2021-06-28 | End: 2021-07-02

## 2021-06-28 RX ORDER — DILTIAZEM HYDROCHLORIDE 120 MG/1
120 CAPSULE, COATED, EXTENDED RELEASE ORAL DAILY
Status: DISCONTINUED | OUTPATIENT
Start: 2021-06-28 | End: 2021-06-28 | Stop reason: HOSPADM

## 2021-06-28 RX ORDER — SOTALOL HYDROCHLORIDE 80 MG/1
80 TABLET ORAL 2 TIMES DAILY
Refills: 0
Start: 2021-06-28 | End: 2021-09-08

## 2021-06-28 RX ORDER — DILTIAZEM HYDROCHLORIDE 120 MG/1
120 CAPSULE, COATED, EXTENDED RELEASE ORAL DAILY
Qty: 30 CAPSULE | Refills: 0 | Status: SHIPPED | OUTPATIENT
Start: 2021-06-29 | End: 2021-07-20 | Stop reason: SDUPTHER

## 2021-06-28 RX ADMIN — SOTALOL HYDROCHLORIDE 80 MG: 80 TABLET ORAL at 08:23

## 2021-06-28 RX ADMIN — DILTIAZEM HYDROCHLORIDE 30 MG: 30 TABLET, FILM COATED ORAL at 05:41

## 2021-06-28 RX ADMIN — HYDROCHLOROTHIAZIDE 12.5 MG: 12.5 TABLET ORAL at 08:23

## 2021-06-28 RX ADMIN — DILTIAZEM HYDROCHLORIDE 120 MG: 120 CAPSULE, COATED, EXTENDED RELEASE ORAL at 10:10

## 2021-06-28 RX ADMIN — LISINOPRIL 10 MG: 10 TABLET ORAL at 08:23

## 2021-06-28 NOTE — PLAN OF CARE
Problem: CARDIOVASCULAR - ADULT  Goal: Maintains optimal cardiac output and hemodynamic stability  Description: INTERVENTIONS:  - Monitor I/O, vital signs and rhythm  - Monitor for S/S and trends of decreased cardiac output  - Administer and titrate ordered vasoactive medications to optimize hemodynamic stability  - Assess quality of pulses, skin color and temperature  - Assess for signs of decreased coronary artery perfusion  - Instruct patient to report change in severity of symptoms  Outcome: Progressing  Goal: Absence of cardiac dysrhythmias or at baseline rhythm  Description: INTERVENTIONS:  - Continuous cardiac monitoring, vital signs, obtain 12 lead EKG if ordered  - Administer antiarrhythmic and heart rate control medications as ordered  - Monitor electrolytes and administer replacement therapy as ordered  Outcome: Progressing     Problem: HEMATOLOGIC - ADULT  Goal: Maintains hematologic stability  Description: INTERVENTIONS  - Assess for signs and symptoms of bleeding or hemorrhage  - Monitor labs  - Administer supportive blood products/factors as ordered and appropriate  Outcome: Progressing

## 2021-06-28 NOTE — ASSESSMENT & PLAN NOTE
· Patient had right-sided nose bleed which was packed in ER  · Bleeding has subsided, packing is saturated  · Hold Xarelto until cleared by ENT  · Monitor for any further active bleeding  · TT sent to Dr Lalo De Los Santos with ENT  There is availability at the [place] office tomorrow  Per Dr Lalo De Los Santos office will contact patient to schedule appointment for tomorrow  Will place on keflex for abx ppx while packing is in place

## 2021-06-28 NOTE — ASSESSMENT & PLAN NOTE
· Patient admitted with hypertensive urgency with blood pressure of 203/133  · Patient was given IV Cardizem in ER  · Will continue on Zestril, hydrochlorothiazide  · Patient is started on Cardizem 30 mg p o  Q 6 hours - now transitioned to cardizem  mg daily  · Labetalol p r n    · BP improved

## 2021-06-28 NOTE — PROGRESS NOTES
[place]     Progress Note - Greg Trammell 1940, 79 y o  female MRN: [id number]  Unit/Bed#: -01 Encounter: [id number]  Primary Care Provider: Mohamud Becker MD   Date and time admitted to hospital: 6/27/2021  7:42 AM    * Atrial fibrillation with rapid ventricular response [facility])  Assessment & Plan  · Patient admitted with atrial fibrillation with RVR  · Patient has history of AFib and is on Xarelto and sotalol at home  · States she had 3 previous cardioversions done  · Follows up with [facility]  · Started on cardizem 30 mg q 6 hours, transitioned to cardizem  mg daily  · Patient states she did not tolerate Lopressor in the past  · Continue on sotalol, per patient was on 80 mg BID at home - consideration for increasing per cardiology, await recommendations  · Monitor on telemetry  · Troponins are negative x2  · Hold Xarelto in the setting of epistaxis  · Cardiology consult    Hypertension  Assessment & Plan  · Continue on Zestril, hydrochlorothiazide  · Started on Cardizem  · Labetalol p r n   · Monitor vitals as per protocol    Hypertensive urgency  Assessment & Plan  · Patient admitted with hypertensive urgency with blood pressure of 203/133  · Patient was given IV Cardizem in ER  · Will continue on Zestril, hydrochlorothiazide  · Patient is started on Cardizem 30 mg p o  Q 6 hours - now transitioned to cardizem  mg daily  · Labetalol p r n    · BP improved    Epistaxis  Assessment & Plan  · Patient had right-sided nose bleed which was packed in ER  · Bleeding has subsided, packing is saturated  · Hold Xarelto  · Monitor for any further active bleeding  · TT sent to Dr Sharri Castano with ENT      VTE Pharmacologic Prophylaxis:   Pharmacologic: Pharmacologic VTE Prophylaxis contraindicated due to epistaxis  Mechanical VTE Prophylaxis in Place: Yes    Patient Centered Rounds: I have performed bedside rounds with nursing staff today     Discussions with Specialists or Other Care Team Provider: Nursing, CM, Cardiology AP    Education and Discussions with Family / Patient: Discussed with patient directly at bedside  Declined family update, reports family will be at bedside this afternoon to meet with cardiology  Time Spent for Care: 20 minutes  More than 50% of total time spent on counseling and coordination of care as described above  Current Length of Stay: 0 day(s)    Current Patient Status: Observation   Certification Statement: The patient will continue to require additional inpatient hospital stay due to epistaxis/afib RVR    Discharge Plan: Pending clinical course    Code Status: Level 1 - Full Code      Subjective:   Patient feels well  Anxious for discharge  Denies chest pain/palpitations, shortness of breath, nausea/vomiting, abdominal pain  Mild discomfort from nasal packing, denies any postnasal drip/bleeding  Objective:     Vitals:   Temp (24hrs), Av °F (36 7 °C), Min:97 5 °F (36 4 °C), Max:98 3 °F (36 8 °C)    Temp:  [97 5 °F (36 4 °C)-98 3 °F (36 8 °C)] 97 5 °F (36 4 °C)  HR:  [] 85  Resp:  [17] 17  BP: (117-170)/(71-88) 117/71  SpO2:  [94 %-97 %] 97 %  Body mass index is 29 73 kg/m²  Input and Output Summary (last 24 hours): Intake/Output Summary (Last 24 hours) at 2021 1124  Last data filed at 2021 0748  Gross per 24 hour   Intake 520 ml   Output 1100 ml   Net -580 ml       Physical Exam:     Physical Exam  Vitals and nursing note reviewed  Constitutional:       Appearance: Normal appearance  Comments: Appears comfortable, no acute distress   HENT:      Head: Normocephalic  Nose:      Comments: Right sided nasal packing intact  Saturated  No active bleeding noted  Eyes:      General: No scleral icterus  Extraocular Movements: Extraocular movements intact  Conjunctiva/sclera: Conjunctivae normal    Cardiovascular:      Rate and Rhythm: Normal rate   Rhythm irregularly irregular  Pulmonary:      Effort: Pulmonary effort is normal       Breath sounds: Normal breath sounds  No wheezing, rhonchi or rales  Abdominal:      General: Bowel sounds are normal       Palpations: Abdomen is soft  Tenderness: There is no abdominal tenderness  There is no guarding or rebound  Musculoskeletal:         General: No swelling, tenderness or deformity  Cervical back: Normal range of motion  Comments: Able to move upper/lower ext bilaterally without difficulty, no edema   Skin:     General: Skin is warm and dry  Neurological:      Mental Status: She is alert and oriented to person, place, and time  Psychiatric:         Mood and Affect: Mood normal          Speech: Speech normal          Behavior: Behavior normal            Additional Data:     Labs:    Results from last 7 days   Lab Units 06/28/21  0414   WBC Thousand/uL 7 21   HEMOGLOBIN g/dL 14 0   HEMATOCRIT % 42 0   PLATELETS Thousands/uL 194   NEUTROS PCT % 62   LYMPHS PCT % 23   MONOS PCT % 12   EOS PCT % 2     Results from last 7 days   Lab Units 06/28/21  0414   SODIUM mmol/L 139   POTASSIUM mmol/L 3 6   CHLORIDE mmol/L 104   CO2 mmol/L 27   BUN mg/dL 13   CREATININE mg/dL 0 77   ANION GAP mmol/L 8   CALCIUM mg/dL 8 7   ALBUMIN g/dL 3 0*   TOTAL BILIRUBIN mg/dL 0 70   ALK PHOS U/L 38*   ALT U/L 30   AST U/L 18   GLUCOSE RANDOM mg/dL 234*     Results from last 7 days   Lab Units 06/27/21  0817   INR  1 56*         Results from last 7 days   Lab Units 06/28/21  0414   HEMOGLOBIN A1C % 9 5*               * I Have Reviewed All Lab Data Listed Above  * Additional Pertinent Lab Tests Reviewed:  All Labs Within Last 24 Hours Reviewed    Imaging:    Imaging Reports Reviewed Today Include:   Imaging Personally Reviewed by Myself Includes:      Recent Cultures (last 7 days):           Last 24 Hours Medication List:   Current Facility-Administered Medications   Medication Dose Route Frequency Provider Last Rate    acetaminophen  650 mg Oral Q6H PRN Nancy Newsome MD      diltiazem  120 mg Oral Daily THO Phan      hydrochlorothiazide  12 5 mg Oral Daily Nancy Newsome MD      Labetalol HCl  10 mg Intravenous Q6H PRN Nancy Newsome MD      lisinopril  10 mg Oral Daily Nancy Newsome MD      ondansetron  4 mg Intravenous Q6H PRN Nancy Newsome MD      sotalol  80 mg Oral BID Joss Callahan MD          Today, Patient Was Seen By: Issa Anton PA-C    ** Please Note: Dictation voice to text software may have been used in the creation of this document   **

## 2021-06-28 NOTE — ASSESSMENT & PLAN NOTE
· Patient had right-sided nose bleed which was packed in ER  · Bleeding has subsided, packing is saturated  · Hold Xarelto  · Monitor for any further active bleeding  · TT sent to Dr Honey Donaldson with ENT

## 2021-06-28 NOTE — DISCHARGE SUMMARY
[place]     Discharge- Kathy Bowman 1940, 79 y o  female MRN: [id number]  Unit/Bed#: -01 Encounter: [id number]  Primary Care Provider: Vonnie Marshall MD   Date and time admitted to hospital: 6/27/2021  7:42 AM    * Atrial fibrillation with rapid ventricular response [facility])  Assessment & Plan  · Patient admitted with atrial fibrillation with RVR  · Patient has history of AFib and is on Xarelto and sotalol at home  · States she had 3 previous cardioversions done  · Follows up with [facility]  · Started on cardizem 30 mg q 6 hours, transitioned to cardizem  mg daily  · Patient states she did not tolerate Lopressor in the past  · Continue on sotalol, per patient was on 80 mg BID at home  · Troponins are negative x2  · Hold Xarelto in the setting of epistaxis/until cleared by ENT  · Cardiology consult - patient medically stable for discharge home with outpatient cardiology follow up    Hypertension  Assessment & Plan  · Continue on Zestril, hydrochlorothiazide  · Started on Cardizem  · Monitor vitals as per protocol    Epistaxis  Assessment & Plan  · Patient had right-sided nose bleed which was packed in ER  · Bleeding has subsided, packing is saturated  · Hold Xarelto until cleared by ENT  · Monitor for any further active bleeding  · TT sent to Dr Jaron Cain with ENT  There is availability at the [place] office tomorrow  Per Dr Jaron Cain office will contact patient to schedule appointment for tomorrow  Will place on keflex for abx ppx while packing is in place      Discharging Physician / Practitioner: Yogesh Tavarez PA-C  PCP: Vonnie Marshall MD  Admission Date:   Admission Orders (From admission, onward)     Ordered        06/27/21 0910  Place in Observation  Once                   Discharge Date: 06/28/21    Medical Problems     Resolved Problems  Date Reviewed: 6/28/2021        Resolved    Hypertensive urgency 6/28/2021     Resolved by  Rand Bang Joanna Osgood, PA-C                Consultations During Hospital Stay:  · Cardiology  · TT to ENT    Procedures Performed:   · None    Significant Findings / Test Results:   · CBC, BMP WNL  · Troponin normal x2    Incidental Findings:   · As above     Test Results Pending at Discharge (will require follow up): · None     Outpatient Tests Requested:  · None    Complications:  None    Reason for Admission: Afib RVR    Hospital Course: Dasha Clark is a 79 y o  female patient who originally presented to the hospital on 6/27/2021 due to epistaxis  Past medical history significant for atrial fibrillation, essential hypertension, type 2 diabetes mellitus  Patient presented to the emergency department 06/27/2021 due to right-sided nose bleed on Xarelto  Patient was found to be in AFib RVR was admitted for telemetry monitoring and cardiology evaluation  Patient was started on oral Cardizem with improvement  Rates were controlled but the patient remained in atrial fibrillation  Nasal packing had been placed in the ED and Xarelto was held with resolution of epistaxis  Patient was cleared for discharge by Cardiology with close outpatient follow-up with her cardiologist   Discussed with ENT Dr Donovan Ray, recommended Keflex for antibiotic prophylaxis while packing is in place and their office will contact patient for packing removal tomorrow at their [place] office  On day of discharge patient was hemodynamically stable and verbalized understanding for requested outpatient follow-up  Please see above list of diagnoses and related plan for additional information  Condition at Discharge: stable     Discharge Day Visit / Exam:     * Please refer to separate progress note for these details *    Discussion with Family:  Discussed with patient's daughter at bedside  Discharge instructions/Information to patient and family:   See after visit summary for information provided to patient and family  Provisions for Follow-Up Care:  See after visit summary for information related to follow-up care and any pertinent home health orders  Disposition:     Home    For Discharges to [facility] SNF:   · Not Applicable to this Patient - Not Applicable to this Patient    Planned Readmission: None     Discharge Statement:  I spent 55 minutes discharging the patient  This time was spent on the day of discharge  I had direct contact with the patient on the day of discharge  Greater than 50% of the total time was spent examining patient, answering all patient questions, arranging and discussing plan of care with patient as well as directly providing post-discharge instructions  Additional time then spent on discharge activities  Discharge Medications:  See after visit summary for reconciled discharge medications provided to patient and family        ** Please Note: This note has been constructed using a voice recognition system **

## 2021-06-28 NOTE — UTILIZATION REVIEW
Initial Clinical Review    Admission: Date/Time/Statement: 6/27/2021 0910 observation   Admission Orders (From admission, onward)     Ordered        06/27/21 0910  Place in Observation  Once                   Orders Placed This Encounter   Procedures    Place in Observation     Standing Status:   Standing     Number of Occurrences:   1     Order Specific Question:   Level of Care     Answer:   Med Surg [16]     ED Arrival Information     Expected Arrival Acuity    - 6/27/2021 07:38 Urgent         Means of arrival Escorted by Service Admission type    [facility] Member General Medicine Urgent         Arrival complaint    nosebleed        Chief Complaint   Patient presents with    Nose Bleed     pt repotrs a nose bleed that started around 0700 today  reports that this has happened before  +thinners        Initial Presentation:  this is a 79year old female from home to ED admitted to observation due to atrial fib with RVR/hypertensive urgency/Epistaxis  History of atrial fib on Xarelto, hpt  Presented due to epistaxis starting about 40 minutes prior to arrival   On exam found in atrial fib with RVR  Rhythm irregular  Hypertensive  INR 1 56  Glucose 315  In the ED given afrin, tranexamic acid for epistaxis, nasal packing  Cardizem 20 mg IV  Plan is telemetry, continue Sotalol, start Cardizem, hold Xarelto  labetalol as needed for hpt  and consult cardiology  Per cardiology 6/27/2021:  Patient  Has atrial Fibrillation, plan is to increase Sotalol, continue telemetry  Is off anticoagulation, may need Zachery guided cardioversion       ED Triage Vitals   Temperature Pulse Respirations Blood Pressure SpO2   06/27/21 0744 06/27/21 0748 06/27/21 0744 06/27/21 0754 06/27/21 0744   (!) 97 °F (36 1 °C) (!) 135 18 (!) 203/133 98 %      Temp Source Heart Rate Source Patient Position - Orthostatic VS BP Location FiO2 (%)   06/27/21 0744 06/27/21 0744 06/27/21 0823 06/27/21 0823 --   Temporal Monitor Lying Left arm Pain Score       06/27/21 1018       No Pain          Wt Readings from Last 1 Encounters:   06/28/21 86 1 kg (189 lb 13 1 oz)     Additional Vital Signs:   06/28/21 05:39:31  --  91  --  134/79  97  95 %  --  --   06/27/21 23:42:46  --  85  --  149/80  103  94 %  --  --   06/27/21 23:39:47  --  108Abnormal   --  --  --  94 %  --  --   06/27/21 20:31:44  98 1 °F (36 7 °C)  87  --  135/86  102  95 %  --  --   06/27/21 18:03:18  --  72  --  142/85  104  96 %  --  --   06/27/21 16:19:02  --  79  --  153/85  108  95 %  --  --   06/27/21 14:26:04  98 3 °F (36 8 °C)  87  17  161/88  112  95 %  --  --   06/27/21 13:09:48  --  110Abnormal   --  170/85  113  94 %  --  --   06/27/21 10:55:08  --  91  --  186/121Abnormal   143  94 %  None (Room air)  --   06/27/21 10:07:55  98 2 °F (36 8 °C)  88  18  191/124Abnormal   146  95 %  --  --   06/27/21 0915  --  81  18  172/78Abnormal   112  96 %  None (Room air)  Lying   06/27/21 0823  --  61  18  218/97Abnormal   --  --  --         Pertinent Labs/Diagnostic Test Results:     Results from last 7 days   Lab Units 06/28/21  0414 06/27/21  0817   WBC Thousand/uL 7 21 5 18   HEMOGLOBIN g/dL 14 0 14 4   HEMATOCRIT % 42 0 42 4   PLATELETS Thousands/uL 194 197   NEUTROS ABS Thousands/µL 4 49 3 15     Results from last 7 days   Lab Units 06/28/21  0414 06/27/21  0817   SODIUM mmol/L 139 142   POTASSIUM mmol/L 3 6 4 0   CHLORIDE mmol/L 104 104   CO2 mmol/L 27 27   ANION GAP mmol/L 8 11   BUN mg/dL 13 18   CREATININE mg/dL 0 77 0 84   EGFR ml/min/1 73sq m 73 65   CALCIUM mg/dL 8 7 9 0   MAGNESIUM mg/dL 1 7  --    PHOSPHORUS mg/dL 3 6  --      Results from last 7 days   Lab Units 06/28/21  0414   AST U/L 18   ALT U/L 30   ALK PHOS U/L 38*   TOTAL PROTEIN g/dL 6 2*   ALBUMIN g/dL 3 0*   TOTAL BILIRUBIN mg/dL 0 70     Results from last 7 days   Lab Units 06/28/21  0414 06/27/21  0817   GLUCOSE RANDOM mg/dL 234* 315*     Results from last 7 days   Lab Units 06/27/21  1149 06/27/21  0817 TROPONIN I ng/mL <0 02 <0 02     Results from last 7 days   Lab Units 06/27/21  0817   PROTIME seconds 18 6*   INR  1 56*     Results from last 7 days   Lab Units 06/27/21  0817   TSH 3RD GENERATON uIU/mL 2 930     Results from last 7 days   Lab Units 06/27/21  1140   CLARITY UA  Clear   COLOR UA  Yellow   SPEC GRAV UA  1 010   PH UA  7 0   GLUCOSE UA mg/dl >=1000 (1%)*   KETONES UA mg/dl 15 (1+)*   BLOOD UA  Negative   PROTEIN UA mg/dl Negative   NITRITE UA  Negative   BILIRUBIN UA  Negative   UROBILINOGEN UA E U /dl 0 2   LEUKOCYTES UA  Trace*   WBC UA /hpf None Seen   RBC UA /hpf None Seen   BACTERIA UA /hpf None Seen   EPITHELIAL CELLS WET PREP /hpf None Seen     ED Treatment:   Medication Administration from 06/27/2021 0738 to 06/27/2021 7104       Date/Time Order Dose Route Action Comments     06/27/2021 0746 oxymetazoline (AFRIN) 0 05 % nasal spray 2 spray 2 spray Each Nare Given      06/27/2021 0746 tranexamic acid 100mg/mL (for epistaxis) 1,000 mg 1,000 mg Nasal Given      06/27/2021 0746 lidocaine-epinephrine (XYLOCAINE/EPINEPHRINE) 1 %-1:100,000 injection 20 mL 20 mL Infiltration Given      06/27/2021 0821 diltiazem (CARDIZEM) injection 20 mg 20 mg Intravenous Given         Past Medical History:   Diagnosis Date    [facility]     Epistaxis     Hypertension     Sleep apnea      Present on Admission:  **None**      Admitting Diagnosis: Epistaxis [R04 0]  Atrial fibrillation with rapid ventricular response (HCC) [I48 91]  Hypertensive urgency [I16 0]  Right-sided epistaxis [R04 0]  Age/Sex: 79 y o  female  Admission Orders:  Scheduled Medications:  diltiazem, 30 mg, Oral, Q6H [address]  hydrochlorothiazide, 12 5 mg, Oral, Daily  lisinopril, 10 mg, Oral, Daily  sotalol, 80 mg, Oral, BID    sotalol (BETAPACE) tablet 40 mg   Dose: 40 mg  Freq: 2 times daily Route: PO  Start: 06/27/21 1115 End: 06/27/21 1603    Continuous IV Infusions:     PRN Meds:  acetaminophen, 650 mg, Oral, Q6H PRN  Labetalol HCl, 10 mg, Intravenous, Q6H PRN - used x 2 6/27/2021  ondansetron, 4 mg, Intravenous, Q6H PRN          IP CONSULT TO CARDIOLOGY    Network Utilization Review Department  ATTENTION: Please call with any questions or concerns to [phone] and carefully listen to the prompts so that you are directed to the right person  All voicemails are confidential   Ashlie Uribe all requests for admission clinical reviews, approved or denied determinations and any other requests to dedicated fax number below belonging to the campus where the patient is receiving treatment   List of dedicated fax numbers for the Facilities:  [address] DENIALS (Administrative/Medical Necessity) [phone]   [address] (Maternity/NICU/Pediatrics) [phone]   [address] Nata George 6475 [id number] [place] [address] Jaime Brittany Flores [address] [facility] [phone]

## 2021-06-28 NOTE — CASE MANAGEMENT
LOS: 1 day  Pt is not a documented bundle  Pt is not a 30 day readmission  Unplanned readmission score is N/A  Pt is under observation status  Met with Pt  Pt presents AA&Ox3  Discussed role of , discharge planning and discharge preference  Pt reports she lives with her  in a 1 1/2 house in [facility]  Pt reports she is independent with adls and ambulation  Pt reports there is rollator chair, wheelchair, walker, transport chair but she does not use it  Pt reports she has POA and living will  Pt reports she uses [facility] in [place], has prescription plan and is able to afford medications  Pt reports she drives and goes grocery shopping  Pt denies hx of VNA and SNF  Pt denies hx of mental health and drug and alcohol treatment  Pt reports her dtr will transport her home upon discharge and declines discharge needs  CM to follow

## 2021-06-28 NOTE — DISCHARGE INSTR - AVS FIRST PAGE
· Follow up with PCP within 1 week for post hospitalization follow up  · Follow up with your cardiologist in 2 weeks  · A referral has been placed to follow up with ENT as outpatient for packing removal  · Continue to hold Xarelto (blood thinner) until cleared by ENT    Return to the emergency department for further evaluation with any chest pain/palpitations, shortness of breath, nausea/vomiting, abdominal pain, worsening nose bleed

## 2021-06-28 NOTE — ED PROVIDER NOTES
History  Chief Complaint   Patient presents with    Nose Bleed     pt repotrs a nose bleed that started around 0700 today  reports that this has happened before  +thinners        Nose Bleed  Location:  R nare  Severity:  Severe  Duration:  1 hour  Timing:  Constant  Progression:  Unchanged  Chronicity:  New  Context: anticoagulants    Relieved by:  Applying pressure  Worsened by:  Nothing  Ineffective treatments:  Applying pressure  Associated symptoms: blood in oropharynx    Associated symptoms: no fever        Prior to Admission Medications   Prescriptions Last Dose Informant Patient Reported? Taking?   hydrochlorothiazide (HYDRODIURIL) 12 5 mg tablet   Yes Yes   Sig: Take 12 5 mg by mouth daily   lisinopril (ZESTRIL) 10 mg tablet   Yes Yes   Sig: Take 10 mg by mouth daily   rivaroxaban (XARELTO) 20 mg tablet   Yes No   Sig: Take 20 mg by mouth daily   sotalol (BETAPACE) 80 mg tablet   Yes Yes   Sig: Take 40 mg by mouth 2 (two) times a day      Facility-Administered Medications: None       Past Medical History:   Diagnosis Date    [facility]     Epistaxis     Hypertension     Sleep apnea        Past Surgical History:   Procedure Laterality Date    HYSTERECTOMY         History reviewed  No pertinent family history  I have reviewed and agree with the history as documented  E-Cigarette/Vaping     E-Cigarette/Vaping Substances     Social History     Tobacco Use    Smoking status: Never Smoker    Smokeless tobacco: Never Used   Substance Use Topics    Alcohol use: Yes     Comment: social    Drug use: No       Review of Systems   Constitutional: Negative for fever  HENT: Positive for nosebleeds  Respiratory: Negative for shortness of breath  Cardiovascular: Negative for chest pain and palpitations  All other systems reviewed and are negative  Physical Exam  Physical Exam  Vitals and nursing note reviewed  Constitutional:       Appearance: She is well-developed     HENT:      Head: Normocephalic and atraumatic  Right Ear: External ear normal       Left Ear: External ear normal       Nose:      Comments: Right-sided epistaxis  Evidence of blood dripping in posterior oropharynx  Eyes:      General: No scleral icterus  Cardiovascular:      Rate and Rhythm: Tachycardia present  Pulmonary:      Effort: Pulmonary effort is normal  No respiratory distress  Abdominal:      General: There is no distension  Musculoskeletal:         General: No deformity  Normal range of motion  Cervical back: Normal range of motion  Skin:     Findings: No rash  Neurological:      General: No focal deficit present  Mental Status: She is alert and oriented to person, place, and time     Psychiatric:         Mood and Affect: Mood normal          Vital Signs  ED Triage Vitals   Temperature Pulse Respirations Blood Pressure SpO2   06/27/21 0744 06/27/21 0748 06/27/21 0744 06/27/21 0754 06/27/21 0744   (!) 97 °F (36 1 °C) (!) 135 18 (!) 203/133 98 %      Temp Source Heart Rate Source Patient Position - Orthostatic VS BP Location FiO2 (%)   06/27/21 0744 06/27/21 0744 06/27/21 0823 06/27/21 0823 --   Temporal Monitor Lying Left arm       Pain Score       06/27/21 1018       No Pain           Vitals:    06/27/21 2342 06/28/21 0539 06/28/21 0749 06/28/21 1010   BP: 149/80 134/79 119/81 117/71   Pulse: 85 91 85    Patient Position - Orthostatic VS:             Visual Acuity      ED Medications  Medications   hydrochlorothiazide (HYDRODIURIL) tablet 12 5 mg (12 5 mg Oral Given 6/28/21 0823)   lisinopril (ZESTRIL) tablet 10 mg (10 mg Oral Given 6/28/21 0823)   acetaminophen (TYLENOL) tablet 650 mg (has no administration in time range)   ondansetron (ZOFRAN) injection 4 mg (has no administration in time range)   Labetalol HCl (NORMODYNE) injection 10 mg (10 mg Intravenous Given 6/27/21 2347)   sotalol (BETAPACE) tablet 80 mg (80 mg Oral Given 6/28/21 0823)   diltiazem (CARDIZEM CD) 24 hr capsule 120 mg (120 mg Oral Given 6/28/21 1010)   oxymetazoline (AFRIN) 0 05 % nasal spray 2 spray (2 sprays Each Nare Given 6/27/21 0746)   tranexamic acid 100mg/mL (for epistaxis) 1,000 mg (1,000 mg Nasal Given 6/27/21 0746)   lidocaine-epinephrine (XYLOCAINE/EPINEPHRINE) 1 %-1:100,000 injection 20 mL (20 mL Infiltration Given 6/27/21 0746)   diltiazem (CARDIZEM) injection 20 mg (20 mg Intravenous Given 6/27/21 0821)       Diagnostic Studies  Results Reviewed     Procedure Component Value Units Date/Time    TSH, 3rd generation [[id number]]  (Normal) Collected: 06/27/21 0817    Lab Status: Final result Specimen: Blood from Arm, Right Updated: 06/27/21 1141     TSH 3RD GENERATON 2 930 uIU/mL     Narrative:      Patients undergoing fluorescein dye angiography may retain small amounts of fluorescein in the body for 48-72 hours post procedure  Samples containing fluorescein can produce falsely depressed TSH values  If the patient had this procedure,a specimen should be resubmitted post fluorescein clearance        Troponin I [[id number]]  (Normal) Collected: 06/27/21 0817    Lab Status: Final result Specimen: Blood from Arm, Right Updated: 06/27/21 0845     Troponin I <0 02 ng/mL     Protime-INR [[id number]]  (Abnormal) Collected: 06/27/21 0817    Lab Status: Final result Specimen: Blood from Arm, Right Updated: 06/27/21 0839     Protime 18 6 seconds      INR 4 60    Basic metabolic panel [[id number]]  (Abnormal) Collected: 06/27/21 0817    Lab Status: Final result Specimen: Blood from Arm, Right Updated: 06/27/21 0836     Sodium 142 mmol/L      Potassium 4 0 mmol/L      Chloride 104 mmol/L      CO2 27 mmol/L      ANION GAP 11 mmol/L      BUN 18 mg/dL      Creatinine 0 84 mg/dL      Glucose 315 mg/dL      Calcium 9 0 mg/dL      eGFR 65 ml/min/1 73sq m     Narrative:      Rosemary guidelines for Chronic Kidney Disease (CKD):     Stage 1 with normal or high GFR (GFR > 90 mL/min/1 73 square meters)    Stage 2 Mild CKD (GFR = 60-89 mL/min/1 73 square meters)    Stage 3A Moderate CKD (GFR = 45-59 mL/min/1 73 square meters)    Stage 3B Moderate CKD (GFR = 30-44 mL/min/1 73 square meters)    Stage 4 Severe CKD (GFR = 15-29 mL/min/1 73 square meters)    Stage 5 End Stage CKD (GFR <15 mL/min/1 73 square meters)  Note: GFR calculation is accurate only with a steady state creatinine    CBC and differential [[id number]] Collected: 06/27/21 0817    Lab Status: Final result Specimen: Blood from Arm, Right Updated: 06/27/21 0827     WBC 5 18 Thousand/uL      RBC 4 75 Million/uL      Hemoglobin 14 4 g/dL      Hematocrit 42 4 %      MCV 89 fL      MCH 30 3 pg      MCHC 34 0 g/dL      RDW 12 9 %      MPV 9 9 fL      Platelets 464 Thousands/uL      nRBC 0 /100 WBCs      Neutrophils Relative 60 %      Immat GRANS % 0 %      Lymphocytes Relative 24 %      Monocytes Relative 11 %      Eosinophils Relative 4 %      Basophils Relative 1 %      Neutrophils Absolute 3 15 Thousands/µL      Immature Grans Absolute 0 02 Thousand/uL      Lymphocytes Absolute 1 24 Thousands/µL      Monocytes Absolute 0 55 Thousand/µL      Eosinophils Absolute 0 19 Thousand/µL      Basophils Absolute 0 03 Thousands/µL                  XR chest portable    (Results Pending)              Procedures  CriticalCare Time  Performed by: Nina Montaño DO  Authorized by:  Nina Montaño DO     Critical care provider statement:     Critical care time (minutes):  35    Critical care time was exclusive of:  Separately billable procedures and treating other patients and teaching time    Critical care was necessary to treat or prevent imminent or life-threatening deterioration of the following conditions:  Cardiac failure and circulatory failure    Critical care was time spent personally by me on the following activities:  Blood draw for specimens, obtaining history from patient or surrogate, development of treatment plan with patient or surrogate, discussions with consultants, discussions with primary provider, evaluation of patient's response to treatment, examination of patient, ordering and performing treatments and interventions, ordering and review of laboratory studies, ordering and review of radiographic studies, re-evaluation of patient's condition and review of old charts             ED Course                                           MDM  Number of Diagnoses or Management Options  Atrial fibrillation with rapid ventricular response [facility]): new and requires workup  Hypertensive urgency: new and requires workup  Right-sided epistaxis: new and requires workup  Diagnosis management comments: 79-year-old female presenting epistaxis on Xarelto  Patient noted to be in rapid atrial fibrillation and hypertensive in the 312G systolic upon arrival     TXA, Afrin and lidocaine with epinephrine administered with direct pressure  Upon further evaluation, continued to bleed so anterior packing inserted with successful hemostasis  Once bleeding well controlled, rapid AFib addressed with Cardizem  Successful rate control and improvement in blood pressure  Patient agreeable for admission  Given patient's spontaneous epistaxis, hypertension, concern for hypertensive emergency  Patient required IV rate control agent with significant improvement of symptoms and vital signs          Amount and/or Complexity of Data Reviewed  Clinical lab tests: ordered and reviewed  Tests in the medicine section of CPT®: reviewed and ordered  Decide to obtain previous medical records or to obtain history from someone other than the patient: yes  Review and summarize past medical records: yes  Discuss the patient with other providers: yes        Disposition  Final diagnoses:   Right-sided epistaxis   Hypertensive urgency   Atrial fibrillation with rapid ventricular response ([address] )     Time reflects when diagnosis was documented in both MDM as applicable and the Disposition within this note     Time User Action Codes Description Comment    6/27/2021  9:09 AM Youlanda Terry Add [R04 0] Right-sided epistaxis     6/27/2021  9:10 AM Youlanda Terry Add [I16 0] Hypertensive urgency     6/27/2021  9:10 AM Youlanda Terry Add [I48 91] Atrial fibrillation with rapid ventricular response ([address] )     6/27/2021  9:10 AM Youlanda Terry Modify [R04 0] Right-sided epistaxis     6/27/2021  9:10 AM Youlanda Terry Modify [I48 91] Atrial fibrillation with rapid ventricular response ([address] )     6/28/2021  2:20 PM Reggy Kassidy Add [R04 0] Epistaxis     6/28/2021  2:25 PM Reggy Kassidy Add [E11 9] Type 2 diabetes mellitus [facility])       ED Disposition     ED Disposition Condition Date/Time Comment    Admit Stable Sun Jun 27, 2021  9:09 AM Case was discussed with KAMILAH and the patient's admission status was agreed to be Admission Status: observation status to the service of Dr Fauzia Garcia   Follow-up Information     Follow up With Specialties Details Why Anastasiya Valencia MD  Follow up Please call Dr Elia Cushing office to schedule a post hospital Cardiology follow up visit to be seen within 2 weeks of being discharged   [address]  [address] [place] [address]      Obed Lees MD Family Medicine Follow up in 1 week(s)  [address]  [address] [facility]      Larissa Chow MD Otolaryngology Follow up in 2 day(s)  [address] [address]  [phone]            Current Discharge Medication List      START taking these medications    Details   cephalexin (KEFLEX) 500 mg capsule Take 1 capsule (500 mg total) by mouth every 12 (twelve) hours for 4 days  Qty: 8 capsule, Refills: 0    Associated Diagnoses: Epistaxis      diltiazem (CARDIZEM CD) 120 mg 24 hr capsule Take 1 capsule (120 mg total) by mouth daily  Qty: 30 capsule, Refills: 0    Associated Diagnoses: Atrial fibrillation with rapid ventricular response (HCC)         CONTINUE these medications which have CHANGED    Details   rivaroxaban (Xarelto) 20 mg tablet Take 1 tablet (20 mg total) by mouth daily HOLD UNTIL CLEARED BY ENT  Refills: 0    Associated Diagnoses: Epistaxis      sotalol (BETAPACE) 80 mg tablet Take 1 tablet (80 mg total) by mouth 2 (two) times a day  Refills: 0    Associated Diagnoses: Atrial fibrillation with rapid ventricular response (HCC)         CONTINUE these medications which have NOT CHANGED    Details   hydrochlorothiazide (HYDRODIURIL) 12 5 mg tablet Take 12 5 mg by mouth daily      lisinopril (ZESTRIL) 10 mg tablet Take 10 mg by mouth daily               PDMP Review       Value Time User    PDMP Reviewed  Yes 6/28/2021  2:16 PM Kimberley Maciel PA-C          ED Provider  Electronically Signed by           Segundo Strauss DO  06/28/21 1967

## 2021-06-28 NOTE — QUICK NOTE
Requested to evaluate patient's nosebleed and packing by [facility] provider  Patient awake and alert, no distress or complaints  Denies pain  Has packing present on R nostril, blood stained but without active bleeding  Gauze 2x2's and paper tape replaced beneath packing  Will leave packing in place given concern that packing removal will also dislodge clot, and there is no active bleeding noted  SLIM provider aware

## 2021-06-28 NOTE — ASSESSMENT & PLAN NOTE
· Continue on Zestril, hydrochlorothiazide  · Started on Cardizem  · Labetalol p r n   · Monitor vitals as per protocol

## 2021-06-28 NOTE — PHYSICAL THERAPY NOTE
PHYSICAL THERAPY EVAL    Physical Therapy Evaluation      Patient Active Problem List   Diagnosis    Epistaxis    Atrial fibrillation with rapid ventricular response (HCC)    Hypertensive urgency    Hypertension       Past Medical History:   Diagnosis Date    A-fib ([address] )     Epistaxis     Hypertension     Sleep apnea        Past Surgical History:   Procedure Laterality Date    HYSTERECTOMY                    06/28/21 0915   PT Last Visit   PT Visit Date 06/28/21   Note Type   Note type Evaluation   Pain Assessment   Pain Assessment Tool 0-10   Pain Score No Pain   Home Living   Type of Home House   Home Layout Two level; Able to live on main level with bedroom/bathroom  (1STE)   Prior Function   Level of [place] Independent with ADLs and functional mobility   Lives With Spouse   ADL Assistance Independent   IADLs Independent   Falls in the last 6 months 0   Comments Spouse has parkinsons   Pt  assists as needed   Restrictions/Precautions   Weight Bearing Precautions Per Order No   General   Family/Caregiver Present No   Cognition   Overall Cognitive Status WFL   Arousal/Participation Alert   Orientation Level Oriented X4   Memory Within functional limits   Following Commands Follows all commands and directions without difficulty   RLE Assessment   RLE Assessment WFL   LLE Assessment   LLE Assessment WFL   Coordination   Sensation WFL   Light Touch   RLE Light Touch Grossly intact   LLE Light Touch Grossly intact   Bed Mobility   Supine to Sit 7  Independent   Additional items HOB elevated   Transfers   Sit to Stand 6  Modified independent   Additional items Armrests   Stand to Sit 6  Modified independent   Additional items Armrests   Ambulation/Elevation   Gait pattern WNL   Gait Assistance 7  Independent   Assistive Device None   Distance 60ft   Stair Management Assistance Not tested   Balance   Static Sitting Normal Dynamic Sitting Normal   Static Standing Normal   Dynamic Standing Normal   Ambulatory Normal   Higher level balance   (picked object up off of floor  No LOB)   Endurance Deficit   Endurance Deficit No   Activity Tolerance   Activity Tolerance Patient tolerated treatment well   Medical Staff Made Aware [place] carlson   Nurse Made Aware RN, Anjali    Assessment   Prognosis Good   Problem List Obesity   Assessment Patient is an 78y/o F who presented with a nose bleed and was found to have A-fib with RVR  Resides with spouse in a [facility] with 1st floor setup  1STE  Ind without an AD  [place] for spouse who has parkinsons  Current medical status includes muscular stiffness, telemetry, obesity, elevated HR  Patient tolerated session well  Performed all mobility independently  Was initially slow moving due to stiffness but improved with increased mobility  Patient with no balance, strength or endurance deficits  Advised patient to continue to ambulate during hospital stay to avoid deconditioning  She has no further inpatient P T  needs  Recommend she return home  D/C P T  The patient's AM-PAC Basic Mobility Inpatient Short Form Raw Score is 24, Standardized Score is 57 68  A standardized score greater than 42 9 suggests the patient may benefit from discharge to home  Please also refer to the recommendation of the Physical Therapist for safe discharge planning  Barriers to Discharge None   Goals   Patient Goals To go home   PT Treatment Day 0   Plan   Treatment/Interventions   (D/C)   Recommendation   PT Discharge Recommendation No rehabilitation needs   PT - OK to Discharge Yes   AM-PAC Basic Mobility Inpatient   Turning in Bed Without Bedrails 4   Lying on Back to Sitting on Edge of Flat Bed 4   Moving Bed to Chair 4   Standing Up From Chair 4   Walk in Room 4   Climb 3-5 Stairs 4   Basic Mobility Inpatient Raw Score 24   Basic Mobility Standardized Score 57 68       Елена Barragan, PT        Patient Name: Dee Chau Lindsey  Today's Date: 6/28/2021

## 2021-06-28 NOTE — ASSESSMENT & PLAN NOTE
· Patient admitted with atrial fibrillation with RVR  · Patient has history of AFib and is on Xarelto and sotalol at home    · States she had 3 previous cardioversions done  · Follows up with [facility]  · Started on cardizem 30 mg q 6 hours, transitioned to cardizem  mg daily  · Patient states she did not tolerate Lopressor in the past  · Continue on sotalol, per patient was on 80 mg BID at home  · Troponins are negative x2  · Hold Xarelto in the setting of epistaxis/until cleared by ENT  · Cardiology consult - patient medically stable for discharge home with outpatient cardiology follow up

## 2021-06-28 NOTE — ASSESSMENT & PLAN NOTE
· Patient admitted with atrial fibrillation with RVR  · Patient has history of AFib and is on Xarelto and sotalol at home    · States she had 3 previous cardioversions done  · Follows up with [facility]  · Started on cardizem 30 mg q 6 hours, transitioned to cardizem  mg daily  · Patient states she did not tolerate Lopressor in the past  · Continue on sotalol, per patient was on 80 mg BID at home - consideration for increasing per cardiology, await recommendations  · Monitor on telemetry  · Troponins are negative x2  · Hold Xarelto in the setting of epistaxis  · Cardiology consult

## 2021-06-28 NOTE — PROGRESS NOTES
General Cardiology   Progress Note -  [employer]   [facility] 79 y o  female MRN: [id number]    Unit/Bed#: -01 Encounter: [id number]    Assessment:    Persistent Afib  · Known hx of afib; afib/RVR on admit; treated with IV Cardizem  · Hx of Cardioversion x 3 in the past; she is on Sotalol 80 mg BID as outpatient (patient told Dr Gabe Zarate during consult yesterday that she was on 40 mg BID and he documented he would increased to 80 mg BID, patient now tells me this was an error and she is already on 80 mg BID at home)  · Her rates on tele are ranging between 80's and low 100's and she is asymptomatic; will discuss with Cardiology attending if we should increase her to 120 mg BID in the setting of recurrent afib  · She was placed on Cardizem 30 mg Q 6 hrs by Dr Gabe Zarate during consultation yesterday and her rates are ranging between 80 and low 100's; will change this to Cardizem  mg QD   · She is maintained on Xarelto for [address]; this was placed on hold on admit due to epistaxis, will discuss with Cardiology attending if this should be restarted prior to d/c as this is not her first nosebleed per patient  · She follows with Dr Joretta Lefort with [facility] Cardiology group    Essential HTN  · Hypertensive urgency on admit with /133  · IV Cardizem given for afib management and she was placed on PO Cardizem 30 mg Q 6 hrs by Dr Gabe Zarate for afib rate control; will change to Cardizem  mg QD today  · SPB now averaging 140's  · Meds: HCTZ 12 5 mg QD, lisinopril 10 mg QD, sotalol 80 mg BID     Epistaxis  · To the ED with right sided nose bleed that was packed in the ED;  Xarelto placed on hold upon admit  · She is maintained on Xarelto for [address] for afib; this was placed on hold on admit due to epistaxis, will discuss with Cardiology attending if this should be restarted prior to d/c as this is not her first nosebleed per patient  · She follows with Dr Joretta Lefort with [facility] Cardiology group      Plan/Recommendations:  · Afib: she is on Sotalol 80 mg BID as outpatient (patient told Dr Ariana Bynum during consult yesterday that she was on 40 mg BID and he documented he would increased to 80 mg BID, patient now tells me this was an error and she is already on 80 mg BID at home)  · Her rates on tele are ranging between 80's and low 100's and she is asymptomatic; will discuss with Cardiology attending if we should increase her to 120 mg BID in the setting of recurrent afib and also to possibly help to control rates  · She was placed on Cardizem 30 mg Q 6 hrs by Dr Ariana Bynum during consultation yesterday and her rates are ranging between 80 and low 100's; will change this to Cardizem  mg QD today  · She is maintained on Xarelto for [facility] for afib; this was placed on hold on admit due to epistaxis, will discuss with Cardiology attending if this should be restarted prior to d/c as this is not her first nosebleed per patient  · She follows with Dr Nancy Alvarado with [facility] Cardiology group    __________________________________________________________________________    Subjective  The patient offers no cardiac complaints this AM       Review of Systems   Constitutional: Negative for chills, fever and malaise/fatigue  HENT: Positive for nosebleeds  Negative for congestion  Cardiovascular: Negative for chest pain, dyspnea on exertion, leg swelling, orthopnea and palpitations  Respiratory: Negative for cough, shortness of breath (no SOB at rest) and wheezing  Endocrine: Negative  Hematologic/Lymphatic: Negative  Skin: Negative  Musculoskeletal: Negative  Gastrointestinal: Negative for bloating, abdominal pain, nausea and vomiting  Genitourinary: Negative  Neurological: Negative for dizziness and light-headedness  Psychiatric/Behavioral: Negative  All other systems reviewed and are negative        Objective:   Vitals: Blood pressure 119/81, pulse 85, temperature 97 5 °F (36 4 °C), resp  rate 17, height 5' 7" (1 702 m), weight 86 1 kg (189 lb 13 1 oz), SpO2 97 %  ,     Wt Readings from Last 3 Encounters:   06/28/21 86 1 kg (189 lb 13 1 oz)   05/02/17 90 7 kg (200 lb)   04/20/17 88 5 kg (195 lb)        Lab Results   Component Value Date    CREATININE 0 77 06/28/2021    CREATININE 0 84 06/27/2021    CREATININE 0 77 12/16/2020         Body mass index is 29 73 kg/m²  ,     Systolic (82YQQ), EZQ:978 , Min:119 , TTZ:885     Diastolic (62UVW), GIT:57, Min:63, Max:124          Intake/Output Summary (Last 24 hours) at 6/28/2021 0810  Last data filed at 6/28/2021 0748  Gross per 24 hour   Intake 520 ml   Output 1100 ml   Net -580 ml     Weight (last 2 days)     Date/Time   Weight    06/28/21 0419   86 1 (189 82)    06/27/21 1131   87 5 (192 9)    06/27/21 0744   86 2 (190)                Telemetry Review: Afib with rates ranging from 80's-low 100's        Physical Exam  Vitals reviewed  Constitutional:       General: She is not in acute distress  HENT:      Head: Normocephalic and atraumatic  Nose:      Comments: Blood soaked gauze packing in right nares     Mouth/Throat:      Mouth: Mucous membranes are moist    Cardiovascular:      Rate and Rhythm: Normal rate  Rhythm irregularly irregular  Heart sounds: Normal heart sounds, S1 normal and S2 normal  No murmur heard  Pulmonary:      Effort: Pulmonary effort is normal  No respiratory distress  Breath sounds: Normal breath sounds  Abdominal:      General: Bowel sounds are normal  There is no distension  Palpations: Abdomen is soft  Musculoskeletal:         General: Normal range of motion  Cervical back: Normal range of motion and neck supple  Right lower leg: No edema  Left lower leg: No edema  Skin:     General: Skin is warm and dry  Neurological:      Mental Status: She is alert and oriented to person, place, and time     Psychiatric:         Mood and Affect: Mood normal          LABORATORY RESULTS  Results from last 7 days   Lab Units 06/27/21  1149 06/27/21  0817   TROPONIN I ng/mL <0 02 <0 02     CBC with diff:   Results from last 7 days   Lab Units 06/28/21  0414 06/27/21  0817   WBC Thousand/uL 7 21 5 18   HEMOGLOBIN g/dL 14 0 14 4   HEMATOCRIT % 42 0 42 4   MCV fL 90 89   PLATELETS Thousands/uL 194 197   MCH pg 29 9 30 3   MCHC g/dL 33 3 34 0   RDW % 13 1 12 9   MPV fL 9 9 9 9   NRBC AUTO /100 WBCs 0 0       CMP:  Results from last 7 days   Lab Units 06/28/21  0414 06/27/21  0817   POTASSIUM mmol/L 3 6 4 0   CHLORIDE mmol/L 104 104   CO2 mmol/L 27 27   BUN mg/dL 13 18   CREATININE mg/dL 0 77 0 84   CALCIUM mg/dL 8 7 9 0   AST U/L 18  --    ALT U/L 30  --    ALK PHOS U/L 38*  --    EGFR ml/min/1 73sq m 73 65       BMP:  Results from last 7 days   Lab Units 06/28/21  0414 06/27/21  0817   POTASSIUM mmol/L 3 6 4 0   CHLORIDE mmol/L 104 104   CO2 mmol/L 27 27   BUN mg/dL 13 18   CREATININE mg/dL 0 77 0 84   CALCIUM mg/dL 8 7 9 0       No results found for: NTBNP          Results from last 7 days   Lab Units 06/28/21  0414   MAGNESIUM mg/dL 1 7                 Results from last 7 days   Lab Units 06/27/21  0817   TSH 3RD GENERATON uIU/mL 2 930       Results from last 7 days   Lab Units 06/27/21  0817   INR  1 56*       Lipid Profile:   No results found for: CHOL  Lab Results   Component Value Date    HDL 41 12/16/2020    HDL 41 05/29/2020     Lab Results   Component Value Date    LDLCALC 113 (H) 12/16/2020    LDLCALC 114 (H) 05/29/2020     Lab Results   Component Value Date    TRIG 123 12/16/2020    TRIG 104 05/29/2020       Cardiac testing:   No results found for this or any previous visit  No results found for this or any previous visit  No results found for this or any previous visit  No valid procedures specified  No results found for this or any previous visit          Meds/Allergies   current meds:   Current Facility-Administered Medications   Medication Dose Route Frequency    acetaminophen (TYLENOL) tablet 650 mg  650 mg Oral Q6H PRN    diltiazem (CARDIZEM) tablet 30 mg  30 mg Oral Q6H [address]    hydrochlorothiazide (HYDRODIURIL) tablet 12 5 mg  12 5 mg Oral Daily    Labetalol HCl (NORMODYNE) injection 10 mg  10 mg Intravenous Q6H PRN    lisinopril (ZESTRIL) tablet 10 mg  10 mg Oral Daily    ondansetron (ZOFRAN) injection 4 mg  4 mg Intravenous Q6H PRN    sotalol (BETAPACE) tablet 80 mg  80 mg Oral BID     Medications Prior to Admission   Medication    hydrochlorothiazide (HYDRODIURIL) 12 5 mg tablet    lisinopril (ZESTRIL) 10 mg tablet    sotalol (BETAPACE) 80 mg tablet    rivaroxaban (XARELTO) 20 mg tablet            Counseling / Coordination of Care  Total floor / unit time spent today 20 minutes  Greater than 50% of total time was spent with the patient and / or family counseling and / or coordination of care  ** Please Note: Dragon 360 Dictation voice to text software may have been used in the creation of this document   **

## 2021-06-29 ENCOUNTER — TELEPHONE (OUTPATIENT)
Dept: CARDIOLOGY CLINIC | Facility: CLINIC | Age: 81
End: 2021-06-29

## 2021-06-29 NOTE — TELEPHONE ENCOUNTER
Patient left a voicemail to schedule Hospital Follow up with Min Garcia     Patients to have prior Cardiology records faxed to us prior to appt     Dr Shankar Re did a Consult in the [address] [place] [address] Avenue back to make follow up - she was in a Dr's appointment & will call us back to schedule

## 2021-07-07 ENCOUNTER — APPOINTMENT (OUTPATIENT)
Dept: LAB | Facility: CLINIC | Age: 81
End: 2021-07-07
Payer: MEDICARE

## 2021-07-07 DIAGNOSIS — E11.9 DIABETES MELLITUS, STABLE (HCC): ICD-10-CM

## 2021-07-07 LAB
EST. AVERAGE GLUCOSE BLD GHB EST-MCNC: 226 MG/DL
HBA1C MFR BLD: 9.5 %

## 2021-07-07 PROCEDURE — 83036 HEMOGLOBIN GLYCOSYLATED A1C: CPT

## 2021-07-07 PROCEDURE — 36415 COLL VENOUS BLD VENIPUNCTURE: CPT

## 2021-07-16 ENCOUNTER — TELEPHONE (OUTPATIENT)
Dept: OBGYN CLINIC | Facility: OTHER | Age: 81
End: 2021-07-16

## 2021-07-16 NOTE — PROGRESS NOTES
Cardiology  Hospital Follow Up   Office Visit Note  Bob Lisa   79 y o    female   MRN: [id number]  [place] [id number] [facility] [address] [facility] [id number] [phone]    PCP: Harjinder Peng MD  Cardiologist: will be Dr Aric [place]  Prior: [facility] Cardiology/[facility]            Summary of recommendations  Echocardiogram  Refer to EP, Dr Luciana Fuentes  Consider AFib ablation vs alternate antiarrhythmic therapy  Follow up will be scheduled with Dr Aric [place]- new pt appt  Pt request  Transfer care to [facility]        Assessment/plan  Paroxysmal atrial fibrillation, recurrent  Persistent  When in AFib she has dyspnea, she has fatigue  She does not express palpitations however  On antiarrhythmic therapy with sotalol 80 mg b i d  Diltiazem 120 mg daily is new  Anticoagulated with rivaroxaban 20 mg daily  She is now breaking through antiarrhythmic therapy  Her EKG today shows AFib 83 beats per minute, poor anterior R-wave progression  Will get an updated echocardiogram   Will refer to EP:  Consider ablation, alternate antiarrhythmic therapy  -status DCCV October 2015, June 2017  She was started on sotalol Nov 2017, followed by a cardioversion  Back in AFib February 2019   She had increased fatigue and mild DREW,  and underwent cardioversion February 2019  -her obstructive sleep apnea is treated with CPAP  She had a recent appointment with sleep medicine and her events have decreased significantly from 20/4 down to 3, a night  She is compliant with her therapy  She does not drink excess alcohol  TR TSH is satisfactory  Mild-to-moderate MR, echo July 2019  Will get a reassessment  Hypertension, essential   /82  on diltiazem  mg daily, HCTZ 12 5 mg daily, lisinopril 10 mg daily  Hyperlipidemia  She is on over-the-counter red rice yeast   MISHA, treated with CPAP   Diabetes mellitus type 2  Hemoglobin A1c 7 2 beginning of 2021   New Diagnosis ; she is on Jardiance  Cardiac testing   TTE July 2019 ( [facility])showed normal biventricular systolic function with mild-to-moderate MR, normal RVSP  Borderline LVH  Moderately dilated left atrium   Holter July 2019 ([facility]) sinus rhythm  Rare atrial and ventricular ectopy, no AFib            HPI  Chiara Nazario is an 30-year-old female with history of atrial fibrillation, diabetes mellitus, sleep apnea, hypertension     She follows with [facility] cardiology at [facility]  She has had several prior cardioversions, lastly February 2019  An echocardiogram in July 2019 showed normal biventricular systolic function with mild-to-moderate MR, normal RVSP  Holter monitor showed sinus rhythm with rare atrial and ventricular ectopy, no AFib  Her  has Parkinson's disease    ADM 6/27-6/28/21  [facility]   Epistaxis  Followed by Cardiology  Found to be in AFib with RVR  She was followed by Cardiology     Her sotalol was increased to 80 b i d  Off anticoagulation due to epistaxis  Cardiology recommended considering a DEBORAH guided cardioversion once her epistaxis resolved  Recommend following up with her usual cardiologist    7/20/21  Hospital follow-up  She is accompanied by her daughter  She went to ENT, she had no recurrence of her epistaxis  The issue has been resolved  She has been maintained on her anticoagulation  She is currently on her antiarrhythmic therapy with sotalol 80 mg b i d , Cardizem  was added  Her EKG today shows AFib 83 beats per minute  She has fatigue  She has dyspnea on exertion, at higher level activity, not necessarily walking down the [place]  Her sleep apnea is well controlled on CPAP  She had a recent appointment with sleep medicine  She is not drink alcohol  She does have a fair amount of stress; she takes care of her  who has advanced Parkinson's  He is not able to be left alone  He does sleep well at night    Whenever she is out of the house, she does need coverage to take care of him  Additionally, she is finding that her left knee is buckling and sometimes feels like it is going to give out  She is going to be to seeing orthopedics soon  Not clear if she may need an operation  We discussed treatment options  It is not clear if her repeat cardioversion would be of benefit, she has had several in the past   After shared decision-making she would like to have a consult with electrophysiology  She would also like to transition general cardiology care to, Dr Vanessa Soria  An appointment has been scheduled, previously arranged for a September  I have spent 40 minutes with Patient and family today in which greater than 50% of this time was spent in counseling/coordination of care regarding Intructions for management, Patient and family education, Importance of tx compliance, Risk factor reductions and Impressions  Assessment  Diagnoses and all orders for this visit:    Atrial fibrillation, unspecified type ([place] [address] )  -     POCT ECG  -     Echo complete with contrast if indicated; Future    Essential hypertension    Mitral valve insufficiency, unspecified etiology    Other orders  -     Jardiance 10 MG TABS; Take 10 mg by mouth daily          Past Medical History:   Diagnosis Date    A-fib ([place] [address] )     Diabetes ([place] [address] )     Epistaxis     Hypertension     Sleep apnea        Review of Systems   Constitutional: Negative for chills  Cardiovascular: Positive for dyspnea on exertion and palpitations  Negative for chest pain, claudication, cyanosis, irregular heartbeat, leg swelling, near-syncope, orthopnea, paroxysmal nocturnal dyspnea and syncope  Respiratory: Negative for cough and shortness of breath  Gastrointestinal: Negative for heartburn and nausea  Neurological: Negative for dizziness, focal weakness, headaches, light-headedness and weakness  All other systems reviewed and are negative        Allergies   Allergen Reactions    Codeine     Sulfa Antibiotics      Current Outpatient Medications:     diltiazem (CARDIZEM CD) 120 mg 24 hr capsule, Take 1 capsule (120 mg total) by mouth daily, Disp: 30 capsule, Rfl: 0    hydrochlorothiazide (HYDRODIURIL) 12 5 mg tablet, Take 12 5 mg by mouth daily, Disp: , Rfl:     Jardiance 10 MG TABS, Take 10 mg by mouth daily, Disp: , Rfl:     lisinopril (ZESTRIL) 10 mg tablet, Take 20 mg by mouth daily , Disp: , Rfl:     rivaroxaban (Xarelto) 20 mg tablet, Take 1 tablet (20 mg total) by mouth daily HOLD UNTIL CLEARED BY ENT, Disp: , Rfl: 0    sotalol (BETAPACE) 80 mg tablet, Take 1 tablet (80 mg total) by mouth 2 (two) times a day, Disp: , Rfl: 0        Social History     Socioeconomic History    Marital status: /Civil Union     Spouse name: Not on file    Number of children: Not on file    Years of education: Not on file    Highest education level: Not on file   Occupational History    Not on file   Tobacco Use    Smoking status: Never Smoker    Smokeless tobacco: Never Used   Vaping Use    Vaping Use: Never used   Substance and Sexual Activity    Alcohol use: Yes     Comment: social    Drug use: No    Sexual activity: Not on file   Other Topics Concern    Not on file   Social History Narrative    Not on file     Social Determinants of Health     Financial Resource Strain:     Difficulty of Paying Living Expenses:    Food Insecurity:     Worried About Running Out of Food in the Last Year:     Ran Out of Food in the Last Year:    Transportation Needs:     Lack of Transportation (Medical):      Lack of Transportation (Non-Medical):    Physical Activity:     Days of Exercise per Week:     Minutes of Exercise per Session:    Stress:     Feeling of Stress :    Social Connections:     Frequency of Communication with Friends and Family:     Frequency of Social Gatherings with Friends and Family:     Attends Tenriism Services:     Active Member of Clubs or Organizations:     Attends Club or Organization Meetings:     Marital Status:    Intimate Partner Violence:     Fear of Current or Ex-Partner:     Emotionally Abused:     Physically Abused:     Sexually Abused:        Family History   Problem Relation Age of Onset    Diabetes Mother     Diabetes Father     Atrial fibrillation Father     Heart attack Father        Physical Exam  Vitals and nursing note reviewed  Constitutional:       General: She is not in acute distress  Appearance: She is not diaphoretic  HENT:      Head: Normocephalic and atraumatic  Eyes:      Conjunctiva/sclera: Conjunctivae normal    Cardiovascular:      Rate and Rhythm: Normal rate  Rhythm irregular  Pulses: Intact distal pulses  Heart sounds: Normal heart sounds  Pulmonary:      Effort: Pulmonary effort is normal       Breath sounds: Normal breath sounds  Abdominal:      General: Bowel sounds are normal       Palpations: Abdomen is soft  Musculoskeletal:         General: Normal range of motion  Cervical back: Normal range of motion and neck supple  Skin:     General: Skin is warm and dry  Neurological:      Mental Status: She is alert and oriented to person, place, and time  Vitals: Blood pressure 118/82, pulse 83, height 5' 7" (1 702 m), weight 85 3 kg (188 lb)     Wt Readings from Last 3 Encounters:   07/20/21 85 3 kg (188 lb)   07/15/21 85 7 kg (189 lb)   06/29/21 85 7 kg (189 lb)         Labs & Results:  Lab Results   Component Value Date    WBC 7 21 06/28/2021    HGB 14 0 06/28/2021    HCT 42 0 06/28/2021    MCV 90 06/28/2021     06/28/2021     No results found for: BNP  No components found for: CHEM  Troponin I   Date Value Ref Range Status   06/27/2021 <0 02 <=0 04 ng/mL Final     Comment:     3Autovalidation override  Siemens Chemistry analyzer 99% cutoff is > 0 04 ng/mL in network labs     o cTnI 99% cutoff is useful only when applied to patients in the clinical setting of myocardial ischemia   o cTnI 99% cutoff should be interpreted in the context of clinical history, ECG findings and possibly cardiac imaging to establish correct diagnosis  o cTnI 99% cutoff may be suggestive but clearly not indicative of a coronary event without the clinical setting of myocardial ischemia      06/27/2021 <0 02 <=0 04 ng/mL Final     Comment:     3Autovalidation override  Siemens Chemistry analyzer 99% cutoff is > 0 04 ng/mL in network labs     o cTnI 99% cutoff is useful only when applied to patients in the clinical setting of myocardial ischemia   o cTnI 99% cutoff should be interpreted in the context of clinical history, ECG findings and possibly cardiac imaging to establish correct diagnosis  o cTnI 99% cutoff may be suggestive but clearly not indicative of a coronary event without the clinical setting of myocardial ischemia  No results found for this or any previous visit  No results found for this or any previous visit  This note was completed in part utilizing Gl[employer]ev[employer]or direct voice recognition software  Grammatical errors, random word insertion, spelling mistakes, and incomplete sentences may be an occasional consequence of the system secondary to software limitations, ambient noise and hardware issues  At the time of dictation, efforts were made to edit, clarify and /or correct errors  Please read the chart carefully and recognize, using context, where substitutions have occurred    If you have any questions or concerns about the context, text or information contained within the body of this dictation, please contact myself, the provider, for further clarification

## 2021-07-20 ENCOUNTER — OFFICE VISIT (OUTPATIENT)
Dept: CARDIOLOGY CLINIC | Facility: CLINIC | Age: 81
End: 2021-07-20
Payer: MEDICARE

## 2021-07-20 VITALS
BODY MASS INDEX: 29.51 KG/M2 | WEIGHT: 188 LBS | HEIGHT: 67 IN | DIASTOLIC BLOOD PRESSURE: 82 MMHG | SYSTOLIC BLOOD PRESSURE: 118 MMHG | HEART RATE: 83 BPM

## 2021-07-20 DIAGNOSIS — I10 ESSENTIAL HYPERTENSION: ICD-10-CM

## 2021-07-20 DIAGNOSIS — I34.0 MITRAL VALVE INSUFFICIENCY, UNSPECIFIED ETIOLOGY: ICD-10-CM

## 2021-07-20 DIAGNOSIS — I48.91 ATRIAL FIBRILLATION, UNSPECIFIED TYPE (HCC): Primary | ICD-10-CM

## 2021-07-20 DIAGNOSIS — I48.91 ATRIAL FIBRILLATION WITH RAPID VENTRICULAR RESPONSE (HCC): ICD-10-CM

## 2021-07-20 PROCEDURE — 99215 OFFICE O/P EST HI 40 MIN: CPT | Performed by: NURSE PRACTITIONER

## 2021-07-20 PROCEDURE — 93000 ELECTROCARDIOGRAM COMPLETE: CPT | Performed by: NURSE PRACTITIONER

## 2021-07-20 RX ORDER — DILTIAZEM HYDROCHLORIDE 120 MG/1
120 CAPSULE, COATED, EXTENDED RELEASE ORAL DAILY
Qty: 30 CAPSULE | Refills: 2 | Status: SHIPPED | OUTPATIENT
Start: 2021-07-20 | End: 2021-09-08 | Stop reason: SDUPTHER

## 2021-07-20 RX ORDER — EMPAGLIFLOZIN 10 MG/1
10 TABLET, FILM COATED ORAL DAILY
COMMUNITY
Start: 2021-07-15

## 2021-07-20 NOTE — LETTER
July 20, 2021     Aleyda Lucia MD  [address]  [address] [facility]    Patient: Handy Higgins   YOB: 1940   Date of Visit: 7/20/2021       Dear Dr Drew Fay: Thank you for referring Gladis Jean Baptiste to me for evaluation  Below are my notes for this consultation  If you have questions, please do not hesitate to call me  I look forward to following your patient along with you  Sincerely,        THO Aleman        CC: MD Gino Collado MD Lajuana Gant, [address]  7/20/2021  3:58 PM  Sign when [facility] Visit  Cardiology  [facility]   Office Visit Note  Handy Higgins   79 y o    female   MRN: [id number]  [facility]  [address] [facility] [address] [facility] [id number] [phone]    PCP: Aleyda Lucia MD  Cardiologist: will be Dr Ene Dow  Prior: [facility]            Summary of recommendations  Echocardiogram  Refer to EP, Dr Amrit Zamudio  Consider AFib ablation vs alternate antiarrhythmic therapy  Follow up will be scheduled with Dr Ene Dow- new pt appt  Pt request  Transfer care to [facility]        Assessment/plan  Paroxysmal atrial fibrillation, recurrent  Persistent  When in AFib she has dyspnea, she has fatigue  She does not express palpitations however  On antiarrhythmic therapy with sotalol 80 mg b i d  Diltiazem 120 mg daily is new  Anticoagulated with rivaroxaban 20 mg daily  She is now breaking through antiarrhythmic therapy  Her EKG today shows AFib 83 beats per minute, poor anterior R-wave progression  Will get an updated echocardiogram   Will refer to EP:  Consider ablation, alternate antiarrhythmic therapy  -status DCCV October 2015, June 2017  She was started on sotalol Nov 2017, followed by a cardioversion  Back in AFib February 2019   She had increased fatigue and mild DREW,  and underwent cardioversion February 2019  -her obstructive sleep apnea is treated with CPAP  She had a recent appointment with sleep medicine and her events have decreased significantly from 20/4 down to 3, a night  She is compliant with her therapy  She does not drink excess alcohol  TR TSH is satisfactory  Mild-to-moderate MR, echo July 2019  Will get a reassessment  Hypertension, essential   /82  on diltiazem  mg daily, HCTZ 12 5 mg daily, lisinopril 10 mg daily  Hyperlipidemia  She is on over-the-counter red rice yeast   MISHA, treated with CPAP   Diabetes mellitus type 2  Hemoglobin A1c 7 2 beginning of 2021  New Diagnosis ; she is on Jardiance  Cardiac testing   TTE July 2019 ( [facility])showed normal biventricular systolic function with mild-to-moderate MR, normal RVSP  Borderline LVH  Moderately dilated left atrium   Holter July 2019 ([facility]) sinus rhythm  Rare atrial and ventricular ectopy, no AFib            HPI  Jeffil March is an 48-year-old female with history of atrial fibrillation, diabetes mellitus, sleep apnea, hypertension     She follows with [facility] cardiology at [address]  She has had several prior cardioversions, lastly February 2019  An echocardiogram in July 2019 showed normal biventricular systolic function with mild-to-moderate MR, normal RVSP  Holter monitor showed sinus rhythm with rare atrial and ventricular ectopy, no AFib  Her  has Parkinson's disease    ADM 6/27-6/28/21  [facility]   Epistaxis  Followed by Cardiology  Found to be in AFib with RVR  She was followed by Cardiology     Her sotalol was increased to 80 b i d  Off anticoagulation due to epistaxis  Cardiology recommended considering a DEBORAH guided cardioversion once her epistaxis resolved  Recommend following up with her usual cardiologist    7/20/21  Hospital follow-up  She is accompanied by her daughter  She went to ENT, she had no recurrence of her epistaxis  The issue has been resolved  She has been maintained on her anticoagulation    She is currently on her antiarrhythmic therapy with sotalol 80 mg b i d , Cardizem  was added  Her EKG today shows AFib 83 beats per minute  She has fatigue  She has dyspnea on exertion, at higher level activity, not necessarily walking down the [place]  Her sleep apnea is well controlled on CPAP  She had a recent appointment with sleep medicine  She is not drink alcohol  She does have a fair amount of stress; she takes care of her  who has advanced Parkinson's  He is not able to be left alone  He does sleep well at night  Whenever she is out of the house, she does need coverage to take care of him  Additionally, she is finding that her left knee is buckling and sometimes feels like it is going to give out  She is going to be to seeing orthopedics soon  Not clear if she may need an operation  We discussed treatment options  It is not clear if her repeat cardioversion would be of benefit, she has had several in the past   After shared decision-making she would like to have a consult with electrophysiology  She would also like to transition general cardiology care to, Dr Lorenza Alexander  An appointment has been scheduled, previously arranged for a September  I have spent 40 minutes with Patient and family today in which greater than 50% of this time was spent in counseling/coordination of care regarding Intructions for management, Patient and family education, Importance of tx compliance, Risk factor reductions and Impressions  Assessment  Diagnoses and all orders for this visit:    Atrial fibrillation, unspecified type ([facility] )  -     POCT ECG  -     Echo complete with contrast if indicated;  Future    Essential hypertension    Mitral valve insufficiency, unspecified etiology    Other orders  -     Jardiance 10 MG TABS; Take 10 mg by mouth daily          Past Medical History:   Diagnosis Date    A-fib ([facility] )     Diabetes ([facility] )     Epistaxis     Hypertension     Sleep apnea        Review of Systems   Constitutional: Negative for chills  Cardiovascular: Positive for dyspnea on exertion and palpitations  Negative for chest pain, claudication, cyanosis, irregular heartbeat, leg swelling, near-syncope, orthopnea, paroxysmal nocturnal dyspnea and syncope  Respiratory: Negative for cough and shortness of breath  Gastrointestinal: Negative for heartburn and nausea  Neurological: Negative for dizziness, focal weakness, headaches, light-headedness and weakness  All other systems reviewed and are negative  Allergies   Allergen Reactions    Codeine     Sulfa Antibiotics            Current Outpatient Medications:     diltiazem (CARDIZEM CD) 120 mg 24 hr capsule, Take 1 capsule (120 mg total) by mouth daily, Disp: 30 capsule, Rfl: 0    hydrochlorothiazide (HYDRODIURIL) 12 5 mg tablet, Take 12 5 mg by mouth daily, Disp: , Rfl:     Jardiance 10 MG TABS, Take 10 mg by mouth daily, Disp: , Rfl:     lisinopril (ZESTRIL) 10 mg tablet, Take 20 mg by mouth daily , Disp: , Rfl:     rivaroxaban (Xarelto) 20 mg tablet, Take 1 tablet (20 mg total) by mouth daily HOLD UNTIL CLEARED BY ENT, Disp: , Rfl: 0    sotalol (BETAPACE) 80 mg tablet, Take 1 tablet (80 mg total) by mouth 2 (two) times a day, Disp: , Rfl: 0        Social History     Socioeconomic History    Marital status: /Civil Union     Spouse name: Not on file    Number of children: Not on file    Years of education: Not on file    Highest education level: Not on file   Occupational History    Not on file   Tobacco Use    Smoking status: Never Smoker    Smokeless tobacco: Never Used   Vaping Use    Vaping Use: Never used   Substance and Sexual Activity    Alcohol use: Yes     Comment: social    Drug use: No    Sexual activity: Not on file   Other Topics Concern    Not on file   Social History Narrative    Not on file     Social Determinants of Health     Financial Resource Strain:     Difficulty of Paying Living Expenses:    Food Insecurity:     Worried About [facility] in the Last Year:    [address] in the Last Year:    Transportation Needs:     Lack of Transportation (Medical):  Lack of Transportation (Non-Medical):    Physical Activity:     Days of Exercise per Week:     Minutes of Exercise per Session:    Stress:     Feeling of Stress :    Social Connections:     Frequency of Communication with Friends and Family:     Frequency of Social Gatherings with Friends and Family:     Attends Hindu Services:     Active Member of Clubs or Organizations:     Attends Club or Organization Meetings:     Marital Status:    Intimate Partner Violence:     Fear of Current or Ex-Partner:     Emotionally Abused:     Physically Abused:     Sexually Abused:        Family History   Problem Relation Age of Onset    Diabetes Mother     Diabetes Father     Atrial fibrillation Father     Heart attack Father        Physical Exam  Vitals and nursing note reviewed  Constitutional:       General: She is not in acute distress  Appearance: She is not diaphoretic  HENT:      Head: Normocephalic and atraumatic  Eyes:      Conjunctiva/sclera: Conjunctivae normal    Cardiovascular:      Rate and Rhythm: Normal rate  Rhythm irregular  Pulses: Intact distal pulses  Heart sounds: Normal heart sounds  Pulmonary:      Effort: Pulmonary effort is normal       Breath sounds: Normal breath sounds  Abdominal:      General: Bowel sounds are normal       Palpations: Abdomen is soft  Musculoskeletal:         General: Normal range of motion  Cervical back: Normal range of motion and neck supple  Skin:     General: Skin is warm and dry  Neurological:      Mental Status: She is alert and oriented to person, place, and time  Vitals: Blood pressure 118/82, pulse 83, height 5' 7" (1 702 m), weight 85 3 kg (188 lb)     Wt Readings from Last 3 Encounters:   07/20/21 85 3 kg (188 lb)   07/15/21 85 7 kg (189 lb)   06/29/21 85 7 kg (189 lb)         Labs & Results:  Lab Results   Component Value Date    WBC 7 21 06/28/2021    HGB 14 0 06/28/2021    HCT 42 0 06/28/2021    MCV 90 06/28/2021     06/28/2021     No results found for: BNP  No components found for: CHEM  Troponin I   Date Value Ref Range Status   06/27/2021 <0 02 <=0 04 ng/mL Final     Comment:     3Autovalidation override  Siemens Chemistry analyzer 99% cutoff is > 0 04 ng/mL in network labs     o cTnI 99% cutoff is useful only when applied to patients in the clinical setting of myocardial ischemia   o cTnI 99% cutoff should be interpreted in the context of clinical history, ECG findings and possibly cardiac imaging to establish correct diagnosis  o cTnI 99% cutoff may be suggestive but clearly not indicative of a coronary event without the clinical setting of myocardial ischemia      06/27/2021 <0 02 <=0 04 ng/mL Final     Comment:     3Autovalidation override  Siemens Chemistry analyzer 99% cutoff is > 0 04 ng/mL in network labs     o cTnI 99% cutoff is useful only when applied to patients in the clinical setting of myocardial ischemia   o cTnI 99% cutoff should be interpreted in the context of clinical history, ECG findings and possibly cardiac imaging to establish correct diagnosis  o cTnI 99% cutoff may be suggestive but clearly not indicative of a coronary event without the clinical setting of myocardial ischemia  No results found for this or any previous visit  No results found for this or any previous visit  This note was completed in part utilizing [employer] direct voice recognition software  Grammatical errors, random word insertion, spelling mistakes, and incomplete sentences may be an occasional consequence of the system secondary to software limitations, ambient noise and hardware issues  At the time of dictation, efforts were made to edit, clarify and /or correct errors    Please read the chart carefully and recognize, using context, where substitutions have occurred    If you have any questions or concerns about the context, text or information contained within the body of this dictation, please contact myself, the provider, for further clarification

## 2021-07-21 ENCOUNTER — OFFICE VISIT (OUTPATIENT)
Dept: OBGYN CLINIC | Facility: CLINIC | Age: 81
End: 2021-07-21
Payer: MEDICARE

## 2021-07-21 ENCOUNTER — APPOINTMENT (OUTPATIENT)
Dept: RADIOLOGY | Facility: CLINIC | Age: 81
End: 2021-07-21
Payer: MEDICARE

## 2021-07-21 VITALS
HEIGHT: 67 IN | SYSTOLIC BLOOD PRESSURE: 120 MMHG | BODY MASS INDEX: 29.51 KG/M2 | WEIGHT: 188 LBS | DIASTOLIC BLOOD PRESSURE: 68 MMHG

## 2021-07-21 DIAGNOSIS — M25.562 LEFT KNEE PAIN, UNSPECIFIED CHRONICITY: ICD-10-CM

## 2021-07-21 DIAGNOSIS — M51.36 DDD (DEGENERATIVE DISC DISEASE), LUMBAR: Primary | ICD-10-CM

## 2021-07-21 DIAGNOSIS — E66.01 MORBID OBESITY (HCC): ICD-10-CM

## 2021-07-21 PROCEDURE — 73564 X-RAY EXAM KNEE 4 OR MORE: CPT

## 2021-07-21 PROCEDURE — 99203 OFFICE O/P NEW LOW 30 MIN: CPT | Performed by: ORTHOPAEDIC SURGERY

## 2021-07-21 PROCEDURE — 77073 BONE LENGTH STUDIES: CPT

## 2021-07-21 NOTE — PROGRESS NOTES
Orthopaedic Surgery Note    CC: Bilateral leg paresthesias      HPI:  Mary Landry is a 79 y  o female presenting with bilateral lower extremity paresthesias  This has been going on for a few months  The paresthesias travel anteriorly down the length of the legs  In addition to the paresthesias, she also reports a "heaviness" of the legs bilaterally  These symptoms are worse when attempting to ambulate, better with rest  She does report some mild aching pain in the left knee, but nothing significant enough to require formal treatment  No associated weakness or numbness  ALLERGIES:  Allergies   Allergen Reactions    Codeine     Sulfa Antibiotics        CURRENT MEDICATIONS:  Current Outpatient Medications   Medication Sig Dispense Refill    diltiazem (CARDIZEM CD) 120 mg 24 hr capsule Take 1 capsule (120 mg total) by mouth daily 30 capsule 2    hydrochlorothiazide (HYDRODIURIL) 12 5 mg tablet Take 12 5 mg by mouth daily      Jardiance 10 MG TABS Take 10 mg by mouth daily      lisinopril (ZESTRIL) 10 mg tablet Take 20 mg by mouth daily       rivaroxaban (Xarelto) 20 mg tablet Take 1 tablet (20 mg total) by mouth daily HOLD UNTIL CLEARED BY ENT  0    sotalol (BETAPACE) 80 mg tablet Take 1 tablet (80 mg total) by mouth 2 (two) times a day  0     No current facility-administered medications for this visit         PAST MEDICAL HISTORY  Past Medical History:   Diagnosis Date    [facility]     [facility]     Epistaxis     Hypertension     Sleep apnea        SURGICAL HISTORY  Past Surgical History:   Procedure Laterality Date    HYSTERECTOMY         FAMILY HISTORY  Family History   Problem Relation Age of Onset    Diabetes Mother     Diabetes Father     Atrial fibrillation Father     Heart attack Father        SOCIAL HISTORY  Social History     Socioeconomic History    Marital status: /Civil Union     Spouse name: Not on file    Number of children: Not on file    Years of education: Not on file    Highest education level: Not on file   Occupational History    Not on file   Tobacco Use    Smoking status: Never Smoker    Smokeless tobacco: Never Used   Vaping Use    Vaping Use: Never used   Substance and Sexual Activity    Alcohol use: Yes     Comment: social    Drug use: No    Sexual activity: Not on file   Other Topics Concern    Not on file   Social History Narrative    Not on file     Social Determinants of Health     Financial Resource Strain:     Difficulty of Paying Living Expenses:    Food Insecurity:     Worried About Running Out of Food in the Last Year:     [address] in the Last Year:    Transportation Needs:     Lack of Transportation (Medical):  Lack of Transportation (Non-Medical):    Physical Activity:     Days of Exercise per Week:     Minutes of Exercise per Session:    Stress:     Feeling of Stress :    Social Connections:     Frequency of Communication with Friends and Family:     Frequency of Social Gatherings with Friends and Family:     Attends Evangelical Services:     Active Member of Clubs or Organizations:     Attends Club or Organization Meetings:     Marital Status:    Intimate Partner Violence:     Fear of Current or Ex-Partner:     Emotionally Abused:     Physically Abused:     Sexually Abused:          Review of Systems     Patient indicated positive for paresthesias  Otherwise negative except per above and HPI  Physical Exam    Vitals  Vitals:    07/21/21 1131   BP: 120/68       BMI  Body mass index is 29 44 kg/m²  GENERAL: No acute distress  Alert and oriented  Well nourished and well hydrated  Appears stated age  HEENT : Normocephalic, atraumatic  Extraocular movements intact  Mucous membranes moist  NECK: Supple, trachea midline  LUNGS: Adequate and symmetric respiratory effort  No intercostal retractions or accessory muscle use  HEART: Extremities warm and perfused  ABDOMEN: Nondistended    SKIN: Warm and dry, no rash  Left  Knee   Inspection/Appearance:       Swelling: No      Patella is midline  Alignment:  Knee is in neutral  Palpation - Soft Tissue: normal without effusion  ROM:       Extension - 0          Flexion - 110      extensor lag: no    Stability:  demonstrates no varus, valgus, anterior drawer or posterior drawer  Patella: stable, tracks normally  Sensation Intact to Light Touch in Sural, Saphenous, Tibial, Superficial Peroneal, and Deep Peroneal Nerve Distribution  Motor function 5 out of 5 strength in Tibialis Anterior, Gastrocnemius, Soleus, Extensor Hallucis Longus, and Flexor Hallucis Longus Muscles  Extremity Warm and Well Perfused  Brisk Capillary Refill in Toes  Imaging  A) Imaging modality available  Radiographs: yes  MRI scan: no  CT scan: no    B) Imaging findings  Subchondral cysts: no  Subchondral sclerosis: yes  Periarticular osteophytes: no  Joint subluxation: no  Joint space narrowing: yes  Bone-on-bone articulations: no  Avascular necrosis: no      Assessment and Plan  Lumbar DDD with concern for lumbar spinal stenosis  Mild-moderate Left  Knee Arthritis  Moderate to severe left hip osteoarthritis    -Most of patients symptoms seem attributable to lumbar spine pathology  Will make referral to physical therapy and referral to Pain management for further evaluation and treatment of the lumbar spine  -In regards to the left knee osteoarthritis, patient is minimally symptomatic at this time  Patient was offered CSI, but declined  -In regards to left hip osteoarthritis, patient does not report any significant symptoms at this time  -Follow up SUSAN Goddard MD  Adult Reconstruction Surgery  Department [address]  12:18 PM

## 2021-08-10 ENCOUNTER — APPOINTMENT (OUTPATIENT)
Dept: RADIOLOGY | Facility: CLINIC | Age: 81
End: 2021-08-10
Payer: MEDICARE

## 2021-08-10 ENCOUNTER — CONSULT (OUTPATIENT)
Dept: PAIN MEDICINE | Facility: CLINIC | Age: 81
End: 2021-08-10
Payer: MEDICARE

## 2021-08-10 VITALS
WEIGHT: 185.9 LBS | DIASTOLIC BLOOD PRESSURE: 84 MMHG | TEMPERATURE: 98.2 F | HEIGHT: 67 IN | SYSTOLIC BLOOD PRESSURE: 128 MMHG | BODY MASS INDEX: 29.18 KG/M2

## 2021-08-10 DIAGNOSIS — R29.898 LEG WEAKNESS, BILATERAL: ICD-10-CM

## 2021-08-10 DIAGNOSIS — M51.36 DDD (DEGENERATIVE DISC DISEASE), LUMBAR: ICD-10-CM

## 2021-08-10 DIAGNOSIS — E11.69 TYPE 2 DIABETES MELLITUS WITH OTHER SPECIFIED COMPLICATION, WITHOUT LONG-TERM CURRENT USE OF INSULIN (HCC): Primary | ICD-10-CM

## 2021-08-10 PROBLEM — E66.9 OBESITY (BMI 30.0-34.9): Status: ACTIVE | Noted: 2021-07-16

## 2021-08-10 PROBLEM — S92.255A CLOSED NONDISPLACED FRACTURE OF NAVICULAR BONE OF LEFT FOOT: Status: ACTIVE | Noted: 2017-04-20

## 2021-08-10 PROBLEM — G47.33 OSA (OBSTRUCTIVE SLEEP APNEA): Status: ACTIVE | Noted: 2021-07-16

## 2021-08-10 PROBLEM — E66.811 OBESITY (BMI 30.0-34.9): Status: ACTIVE | Noted: 2021-07-16

## 2021-08-10 PROBLEM — E78.2 MIXED HYPERLIPIDEMIA: Status: ACTIVE | Noted: 2021-08-10

## 2021-08-10 PROBLEM — Z79.01 LONG TERM CURRENT USE OF ANTICOAGULANT: Status: ACTIVE | Noted: 2021-08-10

## 2021-08-10 PROBLEM — J30.9 ALLERGIC RHINITIS: Status: ACTIVE | Noted: 2021-08-10

## 2021-08-10 PROCEDURE — 99204 OFFICE O/P NEW MOD 45 MIN: CPT | Performed by: ANESTHESIOLOGY

## 2021-08-10 PROCEDURE — 72110 X-RAY EXAM L-2 SPINE 4/>VWS: CPT

## 2021-08-10 NOTE — PROGRESS NOTES
Assessment  1  Type 2 diabetes mellitus with other specified complication, without long-term current use of insulin ([address] )    2  DDD (degenerative disc disease), lumbar    3  Leg weakness, bilateral        Plan    Pleasant 79year-old female with lower extremity weakness as well as numbness from her knees to her toes  I had a discussion with the patient and her daughter who was in attendance regarding the differential diagnosis which includes peripheral neuropathy with her recently diagnosed diabetes mellitus verses lumbar sacral radiculopathy  I am leaning towards peripheral neuropathy, but will order an EMG of the bilateral lower limbs to help better understand the underlying etiology  Order lumbar sacral radiographs as her pain may be secondary to stenosis as well  I will ordering physical therapy lumbar stabilization and strengthening  Patient takes care of her  who has Parkinson's disease order home physical therapy and follow up in approximately 3-4 weeks time  My impressions and treatment recommendations were discussed in detail with the patient who verbalized understanding and had no further questions  Discharge instructions were provided  I personally saw and examined the patient and I agree with the above discussed plan of care  Orders Placed This Encounter   Procedures    X-ray lumbar spine complete 4+ views     Standing Status:   Future     Standing Expiration Date:   8/10/2025     Scheduling Instructions:      Bring along any outside films relating to this procedure   EMG 2 limb lower extremity     Standing Status:   Future     Standing Expiration Date:   8/10/2022     Order Specific Question:   Possible Diagnosis: Answer:   patient scheduled - unknown         History of Present Illness    Eran Arora is a 79 y o  female with four month history of weakness of her lower limbs as well as numbness and tingling from her knees to her toes    Of note patient recently was diagnosed with diabetes mellitus  Patient denies any specific pain did have orthopedic evaluation referred to Pain Medicine  She reports that lying down and relaxation decreases symptoms while going from sitting to standing position and walking aggravates her pain  She denies any bowel or bladder dysfunction  I have personally reviewed and/or updated the patient's past medical history, past surgical history, family history, social history, current medications, allergies, and vital signs today  Review of Systems   Constitutional: Negative for fever and unexpected weight change  HENT: Positive for nosebleeds  Negative for trouble swallowing  Eyes: Negative for visual disturbance  Respiratory: Negative for shortness of breath and wheezing  Cardiovascular: Negative for chest pain and palpitations  Gastrointestinal: Negative for constipation, diarrhea, nausea and vomiting  Endocrine: Negative for cold intolerance, heat intolerance and polydipsia  Genitourinary: Negative for difficulty urinating and frequency  Musculoskeletal: Negative for arthralgias, gait problem, joint swelling and myalgias  Skin: Negative for rash  Neurological: Positive for numbness  Negative for dizziness, seizures, syncope, weakness and headaches  Hematological: Does not bruise/bleed easily  Psychiatric/Behavioral: Negative for dysphoric mood  All other systems reviewed and are negative  Patient Active Problem List   Diagnosis    Epistaxis    [facility]rial fibrillation with [facility] response [facility]    Hypertension    Morbid obesity ([facility] )    Long term current use of anticoagulant    Allergic rhinitis    Closed nondisplaced fracture of navicular bone of left foot    Type 2 diabetes mellitus with other specified complication ([address] )    Mixed hyperlipidemia    Obesity (BMI 30 0-34  9)    MISHA (obstructive sleep apnea)       Past Medical History:   Diagnosis Date    [facility]     Diabetes [facility]     Epistaxis     Hypertension     Sleep apnea        Past Surgical History:   Procedure Laterality Date    HYSTERECTOMY         Family History   Problem Relation Age of Onset    Diabetes Mother     Diabetes Father     Atrial fibrillation Father     Heart attack Father        Social History     Occupational History    Not on file   Tobacco Use    Smoking status: Never Smoker    Smokeless tobacco: Never Used   Vaping Use    Vaping Use: Never used   Substance and Sexual Activity    Alcohol use: Yes     Comment: social    Drug use: No    Sexual activity: Not on file       Current Outpatient Medications on File Prior to Visit   Medication Sig    diltiazem (CARDIZEM CD) 120 mg 24 hr capsule Take 1 capsule (120 mg total) by mouth daily    hydrochlorothiazide (HYDRODIURIL) 12 5 mg tablet Take 12 5 mg by mouth daily    Jardiance 10 MG TABS Take 10 mg by mouth daily    lisinopril (ZESTRIL) 10 mg tablet Take 20 mg by mouth daily     rivaroxaban (Xarelto) 20 mg tablet Take 1 tablet (20 mg total) by mouth daily HOLD UNTIL CLEARED BY ENT    sotalol (BETAPACE) 80 mg tablet Take 1 tablet (80 mg total) by mouth 2 (two) times a day     No current facility-administered medications on file prior to visit  Allergies   Allergen Reactions    Codeine     Other Other (See Comments)    Sulfa Antibiotics        Physical Exam    /84   Temp 98 2 °F (36 8 °C) (Temporal)   Ht 5' 7" (1 702 m)   Wt 84 3 kg (185 lb 14 4 oz)   BMI 29 12 kg/m²     Constitutional: normal, well developed, well nourished, alert, in no distress and non-toxic and no overt pain behavior    Eyes: anicteric  HEENT: grossly intact  Neck: supple, symmetric, trachea midline and no masses   Pulmonary:even and unlabored  Cardiovascular:No edema or pitting edema present  Skin:Normal without rashes or lesions and well hydrated  Psychiatric:Mood and affect appropriate  Neurologic:Cranial Nerves II-XII grossly intact  Musculoskeletal:normal,   Inspection:  Normal station and gait  Normal lumbar lordotic curve with no significant scoliosis or spinal step-off  Skin intact without erythema  No gross mass or muscle atrophy  Palpation:  There is no tenderness to palpation overlying the sacroiliac joint as well as the ischial bursa bilateral   No significant tenderness over the greater trochanteric bursa bilaterally  Motor/Strength:  5/5 strength in the bilateral lower limbs  The patient is able to heel and/toe walk  Tandem gait is intact  Reflexes: Deep tendon reflex are 2+ and symmetrical bilateral patellar and Achilles  Sensation:   Sensation intact to light touch and pinprick in the bilateral lower limbs  Proprioception is intact at bilateral hallux  Maneuvers: Negative bilateral straight leg raising  Negative Massimo's maneuver  Imaging      PACS Images     Show images for XR foot 3+ vw left   Study Result    Narrative & Impression   LEFT FOOT     INDICATION:  Nondisplaced navicular fracture      COMPARISON: April 19, 2017     VIEWS:  3     IMAGES:  3     FINDINGS:     There has been interval healing of a nondisplaced fracture along the dorsum of the left tarsonavicular with fracture line barely visible on the current examination and alignment unchanged from previous examination  No new osseous abnormality is   identified      No degenerative changes      No lytic or blastic lesions are seen      Soft tissues are unremarkable      IMPRESSION:     Unchanged alignment at the site of healing nondisplaced tarsal navicular fracture         Workstation performed: [id number]         PACS Images     Show images for XR ankle 3+ views LEFT   Study Result    Narrative & Impression   LEFT ANKLE     INDICATION:  Ankle injury    Pain     COMPARISON: None     VIEWS:  3     IMAGES:  3     FINDINGS:     There is a fracture of the navicular which is best seen on the lateral view      No degenerative changes      No lytic or blastic lesions are seen      Soft tissues are unremarkable      IMPRESSION:     Acute nondisplaced navicular fracture        ##[email or web address]##imslh        Workstation performed: [id number]       PACS Images     Show images for XR knee 4+ vw left injury   Study Result    Narrative & Impression   LEFT KNEE     INDICATION:   [id number]: Pain in left knee      COMPARISON:  None     VIEWS:  XR KNEE 4+ VW LEFT INJURY         FINDINGS:     There is no acute fracture or dislocation      There is no joint effusion      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

## 2021-08-10 NOTE — PATIENT INSTRUCTIONS
Lumbar Radiculopathy   WHAT YOU NEED TO KNOW:   What is lumbar radiculopathy? Lumbar radiculopathy is a painful condition that happens when a nerve in your lumbar spine (lower back) is pinched or irritated  Nerves control feeling and movement in your body  What causes lumbar radiculopathy? You may get a pinched nerve in your lumbar spine if you have disc damage  Discs are natural, spongy cushions between your vertebrae (back bones) that allow your spine to move  Your discs may move out of place and pinch the nerve in your spine  Your risk for a pinched nerve and lumbar radiculopathy increases if:  · You smoke  · You have diabetes, a spinal infection, or a growth in your spine  · You are overweight  · You are male  · You are elderly  What are the signs and symptoms of lumbar radiculopathy? You may have any of the following:  · Pain that moves from your lower back to your buttocks, groin, and the back of your leg  The pain is often felt below your knee  Your pain may worsen when you cough, sneeze, stand, or sit  · Numbness, weakness, or tingling in your back or legs  How is lumbar radiculopathy diagnosed? Your healthcare provider will examine you and ask about your family history of back and leg pain  He may also test you for weakness, numbness, or tingling in your back, buttocks, and legs  Your healthcare provider may ask you to lie on your back and lift your leg to locate your pain  You may have any of the following:  · Blood tests: You may need blood taken to give healthcare providers information about how your body is working  The blood may be taken from your hand, arm, or IV  · Magnetic resonance imaging (MRI): An MRI machine is used to take a picture of your lower back  Your healthcare provider will use this picture to check for problems and changes in your back bones, nerves, and discs  You will need to lie still during this test  The MRI machine contains a very powerful magnet  Never enter the MRI room with any metal objects  This can cause serious injury  Tell your healthcare provider if you have any metal implants in your body  · X-ray: An x-ray is a picture of your lower back  A lumbar x-ray may show signs of infection or other problems with your spine  · An electromyography (EMG)  test measures the electrical activity of your muscles at rest and with movement  · Computed tomography (CT) scan: A special x-ray machine uses a computer to take pictures of your lower back  It may be used to look at your bones, discs, and nerves  You may be given dye in your IV to help improve the pictures  Tell your healthcare provider if you are allergic to shellfish, or have other allergies or medical conditions  People who are allergic to iodine or shellfish (lobster, crab, or shrimp) may be allergic to some dyes  How is lumbar radiculopathy treated? Treatment of lumbar radiculopathy may reduce pain and swelling, and improve your ability to walk and do your normal activities  Ask your healthcare provider for more information about these and other treatments for lumbar radiculopathy:  · Medicines:     ? NSAIDs , such as ibuprofen, help decrease swelling, pain, and fever  This medicine is available with or without a doctor's order  NSAIDs can cause stomach bleeding or kidney problems in certain people  If you take blood thinner medicine, always ask your healthcare provider if NSAIDs are safe for you  Always read the medicine label and follow directions  ? Muscle relaxers  help decrease pain and muscle spasms  ? Opioids: This is a strong medicine given to reduce severe pain  It is also called narcotic pain medicine  Take this medicine exactly as directed by your healthcare provider  ? Oral steroids: Steroids may be given to reduce swelling and pain  ? Steroid injections: Healthcare providers may give you steroid medicine through a needle (shot) into your lumbar spine   This may help decrease your nerve pain and swelling  You may need more than 1 injection if your symptoms do not improve after the first treatment  · Physical therapy: Your healthcare provider may suggest physical therapy  Your physical therapist may teach you certain exercises to improve posture (the way you stand and sit), flexibility, and strength in your lower back  He may also teach you how to remain safely active and avoid further injury  Follow the exercise plan given to you by your physical therapist     · Transcutaneous electrical nerve stimulation: This treatment, called TENS, stimulates your nerves and may decrease your pain  Wires are attached to pads  The pads are attached to your skin  The wires send a mild current through your nerves  · Surgery: You may need surgery to relieve your pinched nerve if your condition has not improved within 4 to 6 weeks  You may also need it if you have lumbar radiculopathy more than once  What are the risks of treatment for lumbar radiculopathy? · Without treatment, your pain may worsen  The pinched and swollen nerve may lead to problems when you walk or move  In severe cases, you may lose control of your urine or bowel movements  Bedrest can make your symptoms worse  · Pain medicines can cause vomiting, upset stomach, constipation (large, hard bowel movements that are difficult to pass), or kidney or liver problems  Opioid medicine may be addictive (hard to quit taking once you start)  Oral steroids and steroid injections may have side effects, such as facial redness, fluid retention (water weight gain), and mood changes  Steroid injections may be painful and can cause severe headaches, infections, allergic reactions, or nerve damage  Surgery risks include delayed problems with healing, spinal or bladder infection, damage to the spinal cord or other nerves, and ongoing pain  In rare cases, you could become paralyzed (not able to move your arms or legs)      How can I care for myself when I have lumbar radiculopathy? · Stay active: It is best to be active when you have lumbar radiculopathy  Your healthcare provider may tell you to take walks to ease yourself back into your daily routine  Avoid long periods of bed rest  Bed rest could worsen your symptoms  Do not move in ways that increase your pain  Ask your healthcare provider for more information about the best ways to stay active  · Use ice or heat packs:  Use ice or heat packs on the sore area of your body to decrease the pain and swelling  Put ice in a plastic bag covered with a towel on your low back  Cover heated items with a towel to avoid burns  Use ice and heat as directed  · Avoid heavy lifting: Your condition may worsen if you lift heavy things  Avoid lifting if possible  · Maintain a healthy weight:  Excess body weight may strain your back  Talk with your healthcare provider about ways to lose excess weight if you are overweight  When should I contact my healthcare provider? · Your pain does not improve within 1 to 3 weeks after treatment  · Your pain and weakness keep you from your normal activities at work, home, or school  · You lose more than 10 pounds in 6 months without trying  · You become depressed or sad because of the pain  · You have questions or concerns about your condition or care  When should I seek immediate care or call 911? · You have a fever greater than 100 4°F for longer than 2 days  · You have new, severe back or leg pain, or your pain spreads to both legs  · You have any new signs of numbness or weakness, especially in your lower back, legs, arms, or genital area  · You have new trouble controlling your urine and bowel movements  · You do not feel like your bladder empties when you urinate  CARE AGREEMENT:   You have the right to help plan your care  Learn about your health condition and how it may be treated   Discuss treatment options with your healthcare providers to decide what care you want to receive  You always have the right to refuse treatment  The above information is an  only  It is not intended as medical advice for individual conditions or treatments  Talk to your doctor, nurse or pharmacist before following any medical regimen to see if it is safe and effective for you  © Copyright [employer] 2021 Information is for End User's use only and may not be sold, redistributed or otherwise used for commercial purposes   All illustrations and images included in CareNotes® are the copyrighted property of [employer]  or [address] [place] [address]

## 2021-08-11 ENCOUNTER — HOSPITAL ENCOUNTER (OUTPATIENT)
Dept: NON INVASIVE DIAGNOSTICS | Age: 81
Discharge: HOME | End: 2021-08-11
Payer: MEDICARE

## 2021-08-11 DIAGNOSIS — I48.91 ATRIAL FIBRILLATION, UNSPECIFIED TYPE (HCC): ICD-10-CM

## 2021-08-11 PROCEDURE — 93306 TTE W/DOPPLER COMPLETE: CPT | Performed by: INTERNAL MEDICINE

## 2021-08-11 PROCEDURE — 93306 TTE W/DOPPLER COMPLETE: CPT

## 2021-08-29 NOTE — PROGRESS NOTES
EPS Consultation/New Patient Evaluation - Kathy Bowman 79 y o  female MRN: [id number]       Referring:Tania Shirley    CC/HPI:   It was a pleasure to see Kathy Bowman in our arrhythmia clinic at [place] [address]  As you know he is a 79 y o  woman with DM II, HTN, sleep apnea, persistent atrial fibrillation who presents to discuss management of atrial fibrillation  She is here with her daughter who also provides part of the history  Patient has been diagnosed with atrial fibrillation for several years now dating back to at least 2015  She has had 3 cardioversions (2015, 2017 and 2019)  She has not felt palpitations from atrial fibrillation but has noticed fatigued  She was started on sotalol in November of 2017  But has had recurrence while taking it  She did feel better after cardioversion with more energy in sinus rhythm  However she is currently reporting minimal symptoms of fatigue which occurs especially after taking care of her  who has Parkison's disease  She notices her heart rate is usually below 100 beats per minute on no blood pressure machine  She reports compliance with her medications including sotalol and diltiazem  Otherwise she denies dizziness, lightheadedness, orthopnea, PND or syncope     Past Medical History:  Past Medical History:   Diagnosis Date    A-fib ([facility] )     Diabetes ([facility] )     Epistaxis     Hypertension     Sleep apnea        Medications:      Current Outpatient Medications:     Blood Glucose Monitoring Suppl (ONE TOUCH ULTRA 2) w/Device KIT, use as directed by prescriber twice a day, Disp: , Rfl:     diltiazem (CARDIZEM CD) 120 mg 24 hr capsule, Take 1 capsule (120 mg total) by mouth daily, Disp: 30 capsule, Rfl: 2    hydrochlorothiazide (HYDRODIURIL) 12 5 mg tablet, Take 12 5 mg by mouth daily, Disp: , Rfl:     Jardiance 10 MG TABS, Take 10 mg by mouth daily, Disp: , Rfl:     Lancets (OneTouch Delica Plus [id number]) MISC, use as directed by prescriber twice a day, Disp: , Rfl:     lisinopril (ZESTRIL) 20 mg tablet, Take 20 mg by mouth daily , Disp: , Rfl:     OneTouch Ultra test strip, use as directed by prescriber twice a day, Disp: , Rfl:     rivaroxaban (Xarelto) 20 mg tablet, Take 1 tablet (20 mg total) by mouth daily HOLD UNTIL CLEARED BY ENT, Disp: , Rfl: 0    sotalol (BETAPACE) 80 mg tablet, Take 1 tablet (80 mg total) by mouth 2 (two) times a day, Disp: , Rfl: 0     Family History   Problem Relation Age of Onset    Diabetes Mother     Diabetes Father     Atrial fibrillation Father     Heart attack Father      Social History     Socioeconomic History    Marital status: /Civil Union     Spouse name: Not on file    Number of children: Not on file    Years of education: Not on file    Highest education level: Not on file   Occupational History    Not on file   Tobacco Use    Smoking status: Never Smoker    Smokeless tobacco: Never Used   Vaping Use    Vaping Use: Never used   Substance and Sexual Activity    Alcohol use: Yes     Comment: social    Drug use: No    Sexual activity: Not on file   Other Topics Concern    Not on file   Social History Narrative    Not on file     Social Determinants of Health     Financial Resource Strain:     Difficulty of Paying Living Expenses:    Food Insecurity:     Worried About Running Out of Food in the Last Year:     Ran Out of Food in the Last Year:    Transportation Needs:     Lack of Transportation (Medical):      Lack of Transportation (Non-Medical):    Physical Activity:     Days of Exercise per Week:     Minutes of Exercise per Session:    Stress:     Feeling of Stress :    Social Connections:     Frequency of Communication with Friends and Family:     Frequency of Social Gatherings with Friends and Family:     Attends Cheondoism Services:     Active Member of Clubs or Organizations:     Attends Club or Organization Meetings:    Cheryl Long Marital Status:    Intimate Partner Violence:     Fear of Current or Ex-Partner:     Emotionally Abused:     Physically Abused:     Sexually Abused:      Social History     Tobacco Use   Smoking Status Never Smoker   Smokeless Tobacco Never Used     Social History     Substance and Sexual Activity   Alcohol Use Yes    Comment: social       Review of Systems   Constitutional: Positive for malaise/fatigue  Negative for chills and fever  HENT: Negative  Eyes: Negative for blurred vision and double vision  Cardiovascular: Negative for chest pain, dyspnea on exertion, leg swelling, near-syncope, orthopnea, palpitations, paroxysmal nocturnal dyspnea and syncope  Respiratory: Negative for cough and sputum production  Endocrine: Negative  Skin: Negative  Negative for rash  Musculoskeletal: Negative  Negative for arthritis and joint pain  Gastrointestinal: Negative for abdominal pain, nausea and vomiting  Genitourinary: Negative  Neurological: Negative  Negative for dizziness and light-headedness  Psychiatric/Behavioral: Negative  The patient is not nervous/anxious  Objective:     Vitals: Blood pressure 124/60, pulse 77, height 5' 7" (1 702 m), weight 82 2 kg (181 lb 3 2 oz)  , Body mass index is 28 38 kg/m²  ,        Physical Exam:    GEN: Eran Arora appears well, alert and oriented x 3, pleasant and cooperative   HEENT: pupils equal, round, and reactive to light; extraocular muscles intact  NECK: supple, no carotid bruits   HEART: irregularly irregular rhythm, normal S1 and S2, no murmurs, clicks, gallops or rubs   LUNGS: clear to auscultation bilaterally; no wheezes, rales, or rhonchi   ABDOMEN: normal bowel sounds, soft, no tenderness, no distention  EXTREMITIES: peripheral pulses normal; no clubbing, cyanosis, or edema  NEURO: no focal findings   SKIN: normal without suspicious lesions on exposed skin      Labs & Results:  Below is the patient's most recent value for Albumin, ALT, AST, BUN, Calcium, Chloride, Cholesterol, CO2, Creatinine, GFR, Glucose, HDL, Hematocrit, Hemoglobin, Hemoglobin A1C, LDL, Magnesium, Phosphorus, Platelets, Potassium, PSA, Sodium, Triglycerides, and WBC  Lab Results   Component Value Date    ALT 30 2021    AST 18 2021    BUN 13 2021    CALCIUM 8 7 2021     2021    CO2 27 2021    CREATININE 0 77 2021    HDL 41 2020    HCT 42 0 2021    HGB 14 0 2021    HGBA1C 9 5 (H) 2021    MG 1 7 2021    PHOS 3 6 2021     2021    K 3 6 2021    TRIG 123 2020    WBC 7 21 2021     Note: for a comprehensive list of the patient's lab results, access the Results Review activity  Cardiac testing:     I personally reviewed the ECG performed in the clinic on 21  It reveals ablation with ventricular rate of 77 beats per minute  Echocardiograms:  Results for orders placed during the hospital encounter of 21    Echo complete with contrast if indicated    Narrative  [address] [place] [address]    TransthoracicTransthoracic Echocardiogram  2D, M-mode, Doppler, and Color [id number], M-mode, Doppler, and Color Doppler    Study date:  11-Aug-2021    Patient: Edith Thurston  MR number: [id number]  Account number: [de-identified]  : 1940  Age: 79 years  Gender: Female  Status: Outpatient  Location: [facility]  Height: 67 in  Weight: 185 lb  BP: 128/ 84 mmHg    Indications: Atrial fibrillation  Diagnoses: I48 0 - Atrial fibrillation    Sonographer:  Vernida Hashimoto, BS RDCS RVT  Primary Physician:  Ayana Fabian MD  Referring Physician:  THO De La Vega  Group:  [id number]  Interpreting Physician:  Marina Mcneal MD    SUMMARY    LEFT VENTRICLE:  Systolic function was vigorous  Ejection fraction was estimated to be 65 %    There were no regional wall motion abnormalities  Wall thickness was mildly increased  LEFT ATRIUM:  The atrium was markedly dilated  RIGHT ATRIUM:  The atrium was moderately dilated  MITRAL VALVE:  There was mild to moderate annular calcification  There was mild to moderate regurgitation  TRICUSPID VALVE:  There was trace regurgitation  HISTORY: PRIOR HISTORY: Hypertension, Diabetes (NIDDM)  PROCEDURE: The study was performed in the [facility]  This was a routine study  The transthoracic approach was used  The transthoracic approach was used  The study included complete 2D imaging, M-mode, complete spectral  Doppler, and color Doppler  The study included complete 2D imaging, M-mode, complete spectral Doppler, and color Doppler  Images were obtained from the parasternal, apical, subcostal, and suprasternal notch acoustic windows  Image quality  was adequate  LEFT VENTRICLE: Size was normal  Systolic function was vigorous  Ejection fraction was estimated to be 65 %  There were no regional wall motion abnormalities  Wall thickness was mildly increased  No evidence of apical thrombus  DOPPLER:  Left ventricular diastolic function parameters were normal     RIGHT VENTRICLE: The size was normal  Systolic function was normal  Wall thickness was normal     LEFT ATRIUM: The atrium was markedly dilated  RIGHT ATRIUM: The atrium was moderately dilated  MITRAL VALVE: There was mild to moderate annular calcification  Valve structure was normal  There was mild thickening  There was normal leaflet separation  DOPPLER: The transmitral velocity was within the normal range  There was no  evidence for stenosis  There was mild to moderate regurgitation  AORTIC VALVE: The valve was trileaflet  Leaflets exhibited mildly increased thickness, mild calcification, and normal cuspal separation  DOPPLER: Transaortic velocity was within the normal range  There was no evidence for stenosis   There  was no significant regurgitation  TRICUSPID VALVE: The valve structure was normal  There was normal leaflet separation  DOPPLER: The transtricuspid velocity was within the normal range  There was no evidence for stenosis  There was trace regurgitation  PULMONIC VALVE: Leaflets exhibited normal thickness, no calcification, and normal cuspal separation  DOPPLER: The transpulmonic velocity was within the normal range  There was no significant regurgitation  PERICARDIUM: There was no pericardial effusion  The pericardium was normal in appearance  AORTA: The root exhibited normal size  SYSTEMIC VEINS: IVC: The inferior vena cava was normal in size  SYSTEM MEASUREMENT TABLES    2D  %FS: 32 14 %  Ao Diam: 2 77 cm  EDV(Teich): 62 54 ml  EF(Teich): 61 08 %  ESV(Teich): 24 34 ml  IVSd: 1 16 cm  LA Area: 25 77 cm2  LA Diam: 5 47 cm  LVEDV MOD A4C: 60 89 ml  LVEF MOD A4C: 61 29 %  LVESV MOD A4C: 23 57 ml  LVIDd: 3 82 cm  LVIDs: 2 59 cm  LVLd A4C: 6 65 cm  LVLs A4C: 5 45 cm  LVPWd: 1 09 cm  RA Area: 22 53 cm2  RVIDd: 4 7 cm  SV MOD A4C: 37 32 ml  SV(Teich): 38 19 ml    MM  TAPSE: 1 92 cm    PW  E' Sept: 0 07 m/s    [employer] Accredited Echocardiography Laboratory    Prepared and electronically signed by    Laya Avina MD  Signed 11-Aug-2021 11:12:51    No results found for this or any previous visit  Catheterizations:   No results found for this or any previous visit  Stress Tests:  No results found for this or any previous visit  Holter monitor -   No results found for this or any previous visit  No results found for this or any previous visit          ASSESSMENT/PLAN:    #Atrial fibrillation  · Diagnosed since at least 2015  · Multiple cardioversions (2015, 2017, 2019)  · Been on sotalol 80 mg bid since 2017 but has had recurrence  · Feels fatigue though not too symptomatic in atrial fibrillation  · Rates at home been under 100 bpm  · ECHO shows dilated atria  · Discussed options to relieve her symptoms including ablation (higher risk of recurrence due to age, dilated atria) versus PPM/AVJ ablation  · Patient prefers to do minimal treatment if possible  · Will obtain Ziopatch to evaluate rate histogram and then decide if we can discontinue sotalol and maintain rate control strategy with higher dose of diltiazem  · Continue Xarelto, diltiazem and sotalol for now    #MISHA  · Treated with CPAP  · Reports compliance    #Moderate MR  · Likely due to dilated atrium  · Currently minimal symptoms   · Continue follow-up with Shyla Quiñones for further treatment    #HTN  · Normotensive in the office  · Maintained on diltiazem, HCTZ and lisinopril     #DM II  · Last A1c 9 5  · Maintained on Jardiance  · Follow-up with PCP    Follow-up in 3 months

## 2021-08-30 ENCOUNTER — OFFICE VISIT (OUTPATIENT)
Dept: CARDIOLOGY CLINIC | Facility: CLINIC | Age: 81
End: 2021-08-30
Payer: MEDICARE

## 2021-08-30 VITALS
HEART RATE: 77 BPM | WEIGHT: 181.2 LBS | SYSTOLIC BLOOD PRESSURE: 124 MMHG | DIASTOLIC BLOOD PRESSURE: 60 MMHG | BODY MASS INDEX: 28.44 KG/M2 | HEIGHT: 67 IN

## 2021-08-30 DIAGNOSIS — I48.91 ATRIAL FIBRILLATION, UNSPECIFIED TYPE (HCC): Primary | ICD-10-CM

## 2021-08-30 PROCEDURE — 93000 ELECTROCARDIOGRAM COMPLETE: CPT | Performed by: INTERNAL MEDICINE

## 2021-08-30 PROCEDURE — 99215 OFFICE O/P EST HI 40 MIN: CPT | Performed by: INTERNAL MEDICINE

## 2021-08-30 RX ORDER — BLOOD-GLUCOSE METER
EACH MISCELLANEOUS
COMMUNITY
Start: 2021-07-27

## 2021-08-30 RX ORDER — BLOOD SUGAR DIAGNOSTIC
STRIP MISCELLANEOUS
COMMUNITY
Start: 2021-07-27

## 2021-08-30 RX ORDER — LANCETS 30 GAUGE
EACH MISCELLANEOUS
COMMUNITY
Start: 2021-07-27

## 2021-09-08 ENCOUNTER — OFFICE VISIT (OUTPATIENT)
Dept: CARDIOLOGY CLINIC | Facility: CLINIC | Age: 81
End: 2021-09-08
Payer: MEDICARE

## 2021-09-08 VITALS
HEART RATE: 62 BPM | WEIGHT: 182 LBS | DIASTOLIC BLOOD PRESSURE: 72 MMHG | SYSTOLIC BLOOD PRESSURE: 126 MMHG | BODY MASS INDEX: 28.56 KG/M2 | HEIGHT: 67 IN

## 2021-09-08 DIAGNOSIS — I10 ESSENTIAL HYPERTENSION: ICD-10-CM

## 2021-09-08 DIAGNOSIS — I48.19 PERSISTENT ATRIAL FIBRILLATION (HCC): Primary | ICD-10-CM

## 2021-09-08 DIAGNOSIS — G47.33 OSA (OBSTRUCTIVE SLEEP APNEA): ICD-10-CM

## 2021-09-08 DIAGNOSIS — E78.2 MIXED HYPERLIPIDEMIA: ICD-10-CM

## 2021-09-08 PROBLEM — I48.91 ATRIAL FIBRILLATION WITH RAPID VENTRICULAR RESPONSE (HCC): Status: RESOLVED | Noted: 2021-06-27 | Resolved: 2021-09-08

## 2021-09-08 PROCEDURE — 93000 ELECTROCARDIOGRAM COMPLETE: CPT | Performed by: INTERNAL MEDICINE

## 2021-09-08 PROCEDURE — 99214 OFFICE O/P EST MOD 30 MIN: CPT | Performed by: INTERNAL MEDICINE

## 2021-09-08 RX ORDER — DILTIAZEM HYDROCHLORIDE 240 MG/1
240 CAPSULE, COATED, EXTENDED RELEASE ORAL DAILY
Qty: 30 CAPSULE | Refills: 6 | Status: SHIPPED | OUTPATIENT
Start: 2021-09-08 | End: 2021-12-01 | Stop reason: SDUPTHER

## 2021-09-08 NOTE — PROGRESS NOTES
Cardiology Follow-up    Rebekah Velasco  [id number]  1940  [facility] [address] [address] [phone]    1  Persistent atrial fibrillation (HCC)  diltiazem (CARDIZEM CD) 240 mg 24 hr capsule   2  Essential hypertension  POCT ECG   3  MISHA (obstructive sleep apnea)     4  Mixed hyperlipidemia         Discussion/Summary:    1  Persistent atrial fibrillation - We discussed in the office today both a rhythm and rate control strategy  I 1st stated that I would not recommend ablation, given her age, lack of symptoms and significant biatrial enlargement  Antiarrhythmic therapy would need to be continued  Also, her heart rates are currently under good control  We did discuss another cardioversion, but as stated I feel that either her sotalol would have to be increased or she would have to switch to another antiarrhythmic therapy  After a long discussion she wanted to continue just a rate control strategy, which is viable and I agree with  Therefore we will discontinue the sotalol, increase her diltiazem to 240 mg daily and follow her rhythm closely  She will have about 5 to 6 days of her ZIO patch monitor during these changes  We will then have her back in 3 months for an ECG  2   Hypertension - Since we are increasing her diltiazem I told her to discontinue hydrochlorothiazide  We will continue lisinopril  She should periodically check her blood pressure at home  3   Obstructive sleep apnea - She is compliant on CPAP       HPI:  Mrs Wilner Xiong comes in for follow-up regarding her persistent atrial fibrillation along with her history of hypertension  Mary Campa followed with [address] [place] [address] Cardiology for years given paroxysmal atrial fibrillation, which was maintained in sinus rhythm on sotalol antiarrhythmic therapy  More recently she was found to be back in atrial fibrillation  She was seen during hospitalization last month in which he had recurrent nosebleed and was found to be in rapid ventricular response  Diltiazem was added to her regimen in addition to her sotalol  Her heart rates under better control  She met with electrophysiology as discussed repeat cardioversion, ongoing antiarrhythmic therapy, ablation and a rate control strategy  A 2 week extended ambulatory Holter was ordered and she is currently completing this  Fortunately Love Grullon is asymptomatic from a cardiac standpoint  She denies chest pains or any symptoms of angina  No palpitations, lightheadedness or any syncope  She does not feel her atrial fibrillation at all  No signs or symptoms of CHF  She did have an echocardiogram during hospitalization that showed normal LV systolic function with significant left atrial enlargement  Patient Active Problem List   Diagnosis    Epistaxis    Hypertension    Morbid obesity ([place] [address] )    Long term current use of anticoagulant    Allergic rhinitis    Closed nondisplaced fracture of navicular bone of left foot    Type 2 diabetes mellitus with other specified complication ([place] [address] )    Mixed hyperlipidemia    Obesity (BMI 30 0-34  9)    MISHA (obstructive sleep apnea)    Persistent atrial fibrillation (HCC)     Past Medical History:   Diagnosis Date    A-fib ([place] [address] )     Diabetes ([place] [address] )     Epistaxis     Hypertension     Sleep apnea      Social History     Socioeconomic History    Marital status: /Civil Union     Spouse name: Not on file    Number of children: Not on file    Years of education: Not on file    Highest education level: Not on file   Occupational History    Not on file   Tobacco Use    Smoking status: Never Smoker    Smokeless tobacco: Never Used   Vaping Use    Vaping Use: Never used   Substance and Sexual Activity    Alcohol use: Yes     Comment: social    Drug use: No    Sexual activity: Not on file   Other Topics Concern    Not on file   Social History Narrative    Not on file     Social Determinants of Health     Financial Resource Strain:     Difficulty of Paying Living Expenses:    Food Insecurity:     Worried About Running Out of Food in the Last Year:     [address] in the Last Year:    Transportation Needs:     Lack of Transportation (Medical):      Lack of Transportation (Non-Medical):    Physical Activity:     Days of Exercise per Week:     Minutes of Exercise per Session:    Stress:     Feeling of Stress :    Social Connections:     Frequency of Communication with Friends and Family:     Frequency of Social Gatherings with Friends and Family:     Attends Temple Services:     Active Member of Clubs or Organizations:     Attends Club or Organization Meetings:     Marital Status:    Intimate Partner Violence:     Fear of Current or Ex-Partner:     Emotionally Abused:     Physically Abused:     Sexually Abused:       Family History   Problem Relation Age of Onset    Diabetes Mother     Diabetes Father     Atrial fibrillation Father     Heart attack Father      Past Surgical History:   Procedure Laterality Date    HYSTERECTOMY         Current Outpatient Medications:     Blood Glucose Monitoring Suppl (ONE TOUCH ULTRA 2) w/Device KIT, use as directed by prescriber twice a day, Disp: , Rfl:     diltiazem (CARDIZEM CD) 240 mg 24 hr capsule, Take 1 capsule (240 mg total) by mouth daily, Disp: 30 capsule, Rfl: 6    Jardiance 10 MG TABS, Take 10 mg by mouth daily, Disp: , Rfl:     Lancets (OneTouch Delica Plus [id number]) MISC, use as directed by prescriber twice a day, Disp: , Rfl:     lisinopril (ZESTRIL) 20 mg tablet, Take 20 mg by mouth daily , Disp: , Rfl:     OneTouch Ultra test strip, use as directed by prescriber twice a day, Disp: , Rfl:     rivaroxaban (Xarelto) 20 mg tablet, Take 1 tablet (20 mg total) by mouth daily HOLD UNTIL CLEARED BY ENT, Disp: , Rfl: 0  Allergies   Allergen Reactions    Codeine     Other Other (See Comments)    Sulfa Antibiotics      Vitals:    09/08/21 1046   BP: 126/72   BP Location: Left arm   Patient Position: Sitting   Cuff Size: Large   Pulse: 62   Weight: 82 6 kg (182 lb)   Height: 5' 7" (1 702 m)       Labs:  Lab Results   Component Value Date    K 3 6 06/28/2021     06/28/2021    CO2 27 06/28/2021    BUN 13 06/28/2021    CREATININE 0 77 06/28/2021    CALCIUM 8 7 06/28/2021     Lab Results   Component Value Date    WBC 7 21 06/28/2021    HGB 14 0 06/28/2021    HCT 42 0 06/28/2021    MCV 90 06/28/2021     06/28/2021     Lab Results   Component Value Date    TRIG 123 12/16/2020    HDL 41 12/16/2020     Imaging:  ECG today shows atrial fibrillation with a controlled ventricular response  ECHO (8/2021):  LEFT VENTRICLE:  Systolic function was vigorous  Ejection fraction was estimated to be 65 %  There were no regional wall motion abnormalities  Wall thickness was mildly increased      LEFT ATRIUM:  The atrium was markedly dilated      RIGHT ATRIUM:  The atrium was moderately dilated      MITRAL VALVE:  There was mild to moderate annular calcification  There was mild to moderate regurgitation      TRICUSPID VALVE:  There was trace regurgitation  Review of Systems:  Review of Systems   Constitutional: Negative  HENT: Negative  Eyes: Negative  Respiratory: Negative  Cardiovascular: Negative  Gastrointestinal: Negative  Musculoskeletal: Negative  Skin: Negative  Allergic/Immunologic: Negative  Neurological: Negative  Hematological: Negative  Psychiatric/Behavioral: Negative  All other systems reviewed and are negative  Vitals:    09/08/21 1046   BP: 126/72   BP Location: Left arm   Patient Position: Sitting   Cuff Size: Large   Pulse: 62   Weight: 82 6 kg (182 lb)   Height: 5' 7" (1 702 m)       Physical Exam:  Physical Exam  Vitals and nursing note reviewed  Constitutional:       Appearance: She is well-developed  HENT:      Head: Normocephalic and atraumatic  Eyes:      General: No scleral icterus  Right eye: No discharge  Left eye: No discharge  Pupils: Pupils are equal, round, and reactive to light  Neck:      Thyroid: No thyromegaly  Vascular: No JVD  Cardiovascular:      Rate and Rhythm: Normal rate  Rhythm irregularly irregular  No extrasystoles are present  Pulses: Normal pulses  No decreased pulses  Heart sounds: Normal heart sounds, S1 normal and S2 normal  No murmur heard  No friction rub  No gallop  Pulmonary:      Effort: Pulmonary effort is normal  No respiratory distress  Breath sounds: Normal breath sounds  No wheezing, rhonchi or rales  Abdominal:      General: Bowel sounds are normal  There is no distension  Palpations: Abdomen is soft  Tenderness: There is no abdominal tenderness  Musculoskeletal:         General: No tenderness or deformity  Normal range of motion  Cervical back: Normal range of motion and neck supple  Skin:     General: Skin is warm and dry  Findings: No rash  Neurological:      Mental Status: She is alert and oriented to person, place, and time  Cranial Nerves: No cranial nerve deficit  Psychiatric:         Thought Content: Thought content normal          Judgment: Judgment normal        Counseling / Coordination of Care  Total office time spent today 25 minutes  Greater than 50% of total time was spent with the patient and / or family counseling and / or coordination of care

## 2021-09-14 ENCOUNTER — OFFICE VISIT (OUTPATIENT)
Dept: PAIN MEDICINE | Facility: CLINIC | Age: 81
End: 2021-09-14
Payer: MEDICARE

## 2021-09-14 VITALS
TEMPERATURE: 97.9 F | HEIGHT: 67 IN | BODY MASS INDEX: 28.88 KG/M2 | WEIGHT: 184 LBS | DIASTOLIC BLOOD PRESSURE: 64 MMHG | HEART RATE: 70 BPM | SYSTOLIC BLOOD PRESSURE: 130 MMHG

## 2021-09-14 DIAGNOSIS — M54.50 CHRONIC BILATERAL LOW BACK PAIN WITHOUT SCIATICA: ICD-10-CM

## 2021-09-14 DIAGNOSIS — M79.605 PAIN IN BOTH LOWER EXTREMITIES: ICD-10-CM

## 2021-09-14 DIAGNOSIS — M47.816 LUMBAR SPONDYLOSIS: ICD-10-CM

## 2021-09-14 DIAGNOSIS — G89.29 CHRONIC BILATERAL LOW BACK PAIN WITHOUT SCIATICA: ICD-10-CM

## 2021-09-14 DIAGNOSIS — M79.604 PAIN IN BOTH LOWER EXTREMITIES: ICD-10-CM

## 2021-09-14 DIAGNOSIS — M51.16 LUMBAR DISC DISEASE WITH RADICULOPATHY: ICD-10-CM

## 2021-09-14 DIAGNOSIS — G89.4 CHRONIC PAIN SYNDROME: Primary | ICD-10-CM

## 2021-09-14 PROCEDURE — 99213 OFFICE O/P EST LOW 20 MIN: CPT | Performed by: NURSE PRACTITIONER

## 2021-09-14 NOTE — PROGRESS NOTES
Assessment:  1  Chronic pain syndrome    2  Chronic bilateral low back pain without sciatica    3  Pain in both lower extremities    4  Lumbar disc disease with radiculopathy    5  Lumbar spondylosis        Plan:    While the patient was in the office today, I did have a thorough conversation with the patient regarding their chronic pain syndrome, symptoms, medication regimen, and treatment plan  I discussed with the patient at this point time since her pain symptoms have improved and it is her wishes, I agree that since it is not going to impact her treatment plan at this point in time, that it would be in her best interest to hold off on proceeding with the EMG  However, in the future if her pain symptoms should change or worsen an updated MRI of the lumbosacral spine an EMG may be necessary at that time  The patient was agreeable and verbalized an understanding  I did discuss with the patient that at this point another option would be to consider a neuropathic medications such as gabapentin, Lyrica, nortriptyline, and or Cymbalta  However, for now, the patient prefers to stay off of any medications and just continue to do her best to manage her blood sugars and be as active as possible  I encouraged the patient continue with the home exercise and stretching program, trying to utilize 20 minutes of low heat prior to her home exercise and stretching program and to slowly and steadily increase her activity, as tolerated, allowing pain to be her guide  The patient was agreeable and verbalized an understanding  I discussed with the patient that at this point time she can followup with our office on an as-needed basis  I did review the patient that if her pain symptoms should change, worsen, and/or if she would experience any new symptoms as she would like to be evaluated for, she should give our office a call  The patient was agreeable and verbalized an understanding           History of Present Illness: The patient is a 79 y o  female last seen on 8/10/2021 who presents for a follow up office visit in regards to  Chronic pain syndrome secondary to lumbar spondylosis and degenerative disc disease as well as lower extremity radicular pain and neuropathy  The patient currently reports that since her last office visit as she was evaluated by both occupational therapy and physical therapy and deemed a candidate for a home exercise and stretching program, which the patient has been trying to be more diligent about and feels that overall there was definite improvement between her home exercise and stretching program as well as fact that she has been trying to do a much better job of managing her blood sugars and diabetes and that has seemed to significantly help her pain, especially the neuropathy in her legs  She did proceed with the x-rays the lumbosacral spine which showed mild scoliosis and degenerative disc disease of the L5-S1 level with mild to moderate but stable degenerative changes and without any evidence of fractures  She reports that she did not proceed with the previously discussed EMG of the bilateral lower extremities as the next available EMG was in January unless she was willing to travel to [place] and then she could possibly have an in December  The patient reports that at this point since her pain is improving she does not feel it is necessary to proceed with the EMG  The patient presents today for her follow-up visit as scheduled  I have personally reviewed and/or updated the patient's past medical history, past surgical history, family history, social history, current medications, allergies, and vital signs today  Review of Systems:    Review of Systems   Respiratory: Negative for shortness of breath  Cardiovascular: Negative for chest pain  Gastrointestinal: Negative for constipation, diarrhea, nausea and vomiting     Musculoskeletal: Negative for arthralgias, gait problem, joint swelling and myalgias  Skin: Negative for rash  Neurological: Negative for dizziness, seizures and weakness  All other systems reviewed and are negative          Past Medical History:   Diagnosis Date    A-fib ([facility] [address] )     Diabetes ([facility] )     Epistaxis     Hypertension     Sleep apnea        Past Surgical History:   Procedure Laterality Date    HYSTERECTOMY         Family History   Problem Relation Age of Onset    Diabetes Mother     Diabetes Father     Atrial fibrillation Father     Heart attack Father        Social History     Occupational History    Not on file   Tobacco Use    Smoking status: Never Smoker    Smokeless tobacco: Never Used   Vaping Use    Vaping Use: Never used   Substance and Sexual Activity    Alcohol use: Yes     Comment: social    Drug use: No    Sexual activity: Not on file         Current Outpatient Medications:     diltiazem (CARDIZEM CD) 240 mg 24 hr capsule, Take 1 capsule (240 mg total) by mouth daily, Disp: 30 capsule, Rfl: 6    Jardiance 10 MG TABS, Take 10 mg by mouth daily, Disp: , Rfl:     lisinopril (ZESTRIL) 20 mg tablet, Take 20 mg by mouth daily , Disp: , Rfl:     rivaroxaban (Xarelto) 20 mg tablet, Take 1 tablet (20 mg total) by mouth daily HOLD UNTIL CLEARED BY ENT, Disp: , Rfl: 0    Blood Glucose Monitoring Suppl (ONE TOUCH ULTRA 2) w/Device KIT, use as directed by prescriber twice a day, Disp: , Rfl:     Lancets (OneTouch Delica Plus [id number]) MISC, use as directed by prescriber twice a day, Disp: , Rfl:     OneTouch Ultra test strip, use as directed by prescriber twice a day, Disp: , Rfl:     Allergies   Allergen Reactions    Codeine     Other Other (See Comments)    Sulfa Antibiotics        Physical Exam:    /64 (BP Location: Right arm, Patient Position: Sitting, Cuff Size: Standard)   Pulse 70   Temp 97 9 °F (36 6 °C)   Ht 5' 7" (1 702 m)   Wt 83 5 kg (184 lb)   BMI 28 82 kg/m² Constitutional:normal, well developed, well nourished, alert, in no distress and non-toxic and no overt pain behavior  Eyes:anicteric  HEENT:grossly intact  Neck:supple, symmetric, trachea midline and no masses   Pulmonary:even and unlabored  Cardiovascular:No edema or pitting edema present  Skin:Normal without rashes or lesions and well hydrated  Psychiatric:Mood and affect appropriate  Neurologic:Cranial Nerves II-XII grossly intact  Musculoskeletal:Patient's gait is slightly antalgic, but steady without the use of any assistive devices  Imaging  No orders to display         No orders of the defined types were placed in this encounter

## 2021-09-17 ENCOUNTER — TELEPHONE (OUTPATIENT)
Dept: CARDIOLOGY CLINIC | Facility: CLINIC | Age: 81
End: 2021-09-17

## 2021-09-17 ENCOUNTER — CLINICAL SUPPORT (OUTPATIENT)
Dept: CARDIOLOGY CLINIC | Facility: CLINIC | Age: 81
End: 2021-09-17
Payer: MEDICARE

## 2021-09-17 DIAGNOSIS — I48.91 ATRIAL FIBRILLATION, UNSPECIFIED TYPE (HCC): ICD-10-CM

## 2021-09-17 PROCEDURE — 93248 EXT ECG>7D<15D REV&INTERPJ: CPT | Performed by: INTERNAL MEDICINE

## 2021-09-17 NOTE — TELEPHONE ENCOUNTER
Call patient to discuss the recent ZIO patch results    It showed patient has ventricular rate started to increase after September 9th  Her sotalol was discontinued on September 8th and her diltiazem dose was increased to 240 mg when she was seen by Dr Garcia Sees  Patient wants to pursue rate control strategy  She will likely need higher dose of diltiazem  Will let Dr Garcia Sees know

## 2021-09-17 NOTE — TELEPHONE ENCOUNTER
[employer] called in regards to pt's Zio findings:    A-fib; 180 bpm for 1 minute on strip 5 page 9  Was symptomatic during that time  100% A-fib for 14 days  Zio report available in pt's chart

## 2021-09-21 ENCOUNTER — APPOINTMENT (OUTPATIENT)
Dept: LAB | Facility: CLINIC | Age: 81
End: 2021-09-21
Payer: MEDICARE

## 2021-09-21 DIAGNOSIS — E11.9 DIABETES MELLITUS WITHOUT COMPLICATION (HCC): ICD-10-CM

## 2021-09-21 LAB
EST. AVERAGE GLUCOSE BLD GHB EST-MCNC: 140 MG/DL
HBA1C MFR BLD: 6.5 %

## 2021-09-21 PROCEDURE — 83036 HEMOGLOBIN GLYCOSYLATED A1C: CPT

## 2021-09-21 PROCEDURE — 36415 COLL VENOUS BLD VENIPUNCTURE: CPT

## 2021-09-23 DIAGNOSIS — I48.91 ATRIAL FIBRILLATION, UNSPECIFIED TYPE (HCC): Primary | ICD-10-CM

## 2021-09-23 RX ORDER — METOPROLOL SUCCINATE 50 MG/1
50 TABLET, EXTENDED RELEASE ORAL DAILY
Qty: 30 TABLET | Refills: 1 | Status: SHIPPED | OUTPATIENT
Start: 2021-09-23 | End: 2021-11-05 | Stop reason: SDUPTHER

## 2021-11-05 DIAGNOSIS — I48.91 ATRIAL FIBRILLATION, UNSPECIFIED TYPE (HCC): ICD-10-CM

## 2021-11-05 RX ORDER — LISINOPRIL 20 MG/1
20 TABLET ORAL DAILY
Qty: 90 TABLET | Refills: 1 | Status: SHIPPED | OUTPATIENT
Start: 2021-11-05 | End: 2021-12-01 | Stop reason: SDUPTHER

## 2021-11-05 RX ORDER — METOPROLOL SUCCINATE 50 MG/1
50 TABLET, EXTENDED RELEASE ORAL DAILY
Qty: 30 TABLET | Refills: 1 | Status: SHIPPED | OUTPATIENT
Start: 2021-11-05 | End: 2021-12-01 | Stop reason: SDUPTHER

## 2021-12-01 ENCOUNTER — OFFICE VISIT (OUTPATIENT)
Dept: CARDIOLOGY CLINIC | Facility: CLINIC | Age: 81
End: 2021-12-01
Payer: MEDICARE

## 2021-12-01 VITALS
BODY MASS INDEX: 28.22 KG/M2 | HEART RATE: 79 BPM | DIASTOLIC BLOOD PRESSURE: 74 MMHG | HEIGHT: 67 IN | SYSTOLIC BLOOD PRESSURE: 124 MMHG | WEIGHT: 179.8 LBS

## 2021-12-01 DIAGNOSIS — I48.19 PERSISTENT ATRIAL FIBRILLATION (HCC): Primary | ICD-10-CM

## 2021-12-01 DIAGNOSIS — I10 PRIMARY HYPERTENSION: ICD-10-CM

## 2021-12-01 DIAGNOSIS — E78.2 MIXED HYPERLIPIDEMIA: ICD-10-CM

## 2021-12-01 DIAGNOSIS — G47.33 OSA (OBSTRUCTIVE SLEEP APNEA): ICD-10-CM

## 2021-12-01 PROCEDURE — 99214 OFFICE O/P EST MOD 30 MIN: CPT | Performed by: INTERNAL MEDICINE

## 2021-12-01 PROCEDURE — 93000 ELECTROCARDIOGRAM COMPLETE: CPT | Performed by: INTERNAL MEDICINE

## 2021-12-01 RX ORDER — DILTIAZEM HYDROCHLORIDE 240 MG/1
240 CAPSULE, COATED, EXTENDED RELEASE ORAL DAILY
Qty: 90 CAPSULE | Refills: 3 | Status: SHIPPED | OUTPATIENT
Start: 2021-12-01

## 2021-12-01 RX ORDER — LISINOPRIL 20 MG/1
20 TABLET ORAL DAILY
Qty: 90 TABLET | Refills: 3 | Status: SHIPPED | OUTPATIENT
Start: 2021-12-01 | End: 2022-03-07 | Stop reason: SDUPTHER

## 2021-12-01 RX ORDER — METOPROLOL SUCCINATE 50 MG/1
50 TABLET, EXTENDED RELEASE ORAL DAILY
Qty: 90 TABLET | Refills: 3 | Status: SHIPPED | OUTPATIENT
Start: 2021-12-01

## 2021-12-13 ENCOUNTER — APPOINTMENT (OUTPATIENT)
Dept: RADIOLOGY | Facility: CLINIC | Age: 81
End: 2021-12-13
Payer: COMMERCIAL

## 2021-12-13 DIAGNOSIS — M54.50 LOW BACK PAIN, UNSPECIFIED BACK PAIN LATERALITY, UNSPECIFIED CHRONICITY, UNSPECIFIED WHETHER SCIATICA PRESENT: ICD-10-CM

## 2021-12-13 DIAGNOSIS — Z87.39 PERSONAL HISTORY OF ARTHRITIS: ICD-10-CM

## 2021-12-13 PROCEDURE — 72100 X-RAY EXAM L-S SPINE 2/3 VWS: CPT

## 2022-03-07 DIAGNOSIS — I10 PRIMARY HYPERTENSION: ICD-10-CM

## 2022-03-07 RX ORDER — LISINOPRIL 20 MG/1
20 TABLET ORAL DAILY
Qty: 90 TABLET | Refills: 3 | Status: SHIPPED | OUTPATIENT
Start: 2022-03-07

## 2022-03-11 DIAGNOSIS — R04.0 EPISTAXIS: ICD-10-CM

## 2022-06-08 ENCOUNTER — OFFICE VISIT (OUTPATIENT)
Dept: CARDIOLOGY CLINIC | Facility: CLINIC | Age: 82
End: 2022-06-08
Payer: COMMERCIAL

## 2022-06-08 VITALS
HEART RATE: 79 BPM | DIASTOLIC BLOOD PRESSURE: 66 MMHG | SYSTOLIC BLOOD PRESSURE: 126 MMHG | HEIGHT: 67 IN | WEIGHT: 177 LBS | BODY MASS INDEX: 27.78 KG/M2

## 2022-06-08 DIAGNOSIS — G47.33 OSA (OBSTRUCTIVE SLEEP APNEA): ICD-10-CM

## 2022-06-08 DIAGNOSIS — I10 PRIMARY HYPERTENSION: ICD-10-CM

## 2022-06-08 DIAGNOSIS — I48.19 PERSISTENT ATRIAL FIBRILLATION (HCC): Primary | ICD-10-CM

## 2022-06-08 PROCEDURE — 99214 OFFICE O/P EST MOD 30 MIN: CPT | Performed by: INTERNAL MEDICINE

## 2022-06-08 PROCEDURE — 93000 ELECTROCARDIOGRAM COMPLETE: CPT | Performed by: INTERNAL MEDICINE

## 2022-06-08 NOTE — PROGRESS NOTES
Cardiology Follow-up    Wilfredo Tarango  [id number]  1940  [facility] [address] [address] [phone]    1  Persistent atrial fibrillation (HCC)  POCT ECG   2  Primary hypertension     3  MISHA (obstructive sleep apnea)         Discussion/Summary:    1  Persistent atrial fibrillation - I have discussed with Lisa Ortiz in the past both a rate and rhythm control strategy  I stated that I would not recommend ablation, given her age, lack of symptoms and significant biatrial enlargement  A cardioversion is an option however antiarrhythmic therapy would need to be restarted  However she remains relatively asymptomatic and therefore I have recommended continued rate control strategy  She will remain on the same dose of diltiazem and metoprolol  She is on Xarelto for stroke prevention  After our next visit we will repeat an echocardiogram   We will see her back in 1 year  2   Hypertension - Her blood pressure is under good control with Toprol-XL, diltiazem and lisinopril  She should periodically check her blood pressure at home  3   Obstructive sleep apnea - She is compliant on CPAP       HPI:  Mrs Landen Santana comes in for follow-up regarding her persistent atrial fibrillation along with her history of hypertension  Lisa Ortiz followed with AARON RECIN[facility] for years given paroxysmal atrial fibrillation, which was maintained in sinus rhythm on sotalol antiarrhythmic therapy  Just prior to our last visit she was found to be back in atrial fibrillation  She was seen during hospitalization last month in which he had recurrent nosebleed and was found to be in rapid ventricular response  Diltiazem was added to her regimen in addition to her sotalol  Her heart rates were under better control    She met with electrophysiology as discussed repeat cardioversion, ongoing antiarrhythmic therapy, ablation and a rate control strategy  At prior visits we discussed this, comparing rate and rhythm control strategies, and she chose just do a rate control strategy  At that point we stop the sotalol and up titrated her diltiazem  Toi Bolton wore a extended ambulatory Holter which did show periods of rapid atrial fibrillation  At that point Toprol-XL 50 mg daily was added  Her heart rates are now under better control  She persist in atrial fibrillation today  Fortunately Toi Bolton is for the most part asymptomatic from a cardiac standpoint  She has ongoing issues with fatigue, which is likely multifactorial   She denies chest pains or any symptoms of angina  No palpitations, lightheadedness or any syncope  She does not feel her atrial fibrillation at all  No signs or symptoms of CHF  She did have an echocardiogram during hospitalization that showed normal LV systolic function with significant left atrial enlargement  Patient Active Problem List   Diagnosis    Epistaxis    Hypertension    Morbid obesity ([facility] )    Long term current use of anticoagulant    Allergic rhinitis    Closed nondisplaced fracture of navicular bone of left foot    Type 2 diabetes mellitus with other specified complication ([place] [address] )    Mixed hyperlipidemia    Obesity (BMI 30 0-34  9)    MISHA (obstructive sleep apnea)    Persistent atrial fibrillation (HCC)    Chronic pain syndrome    Chronic bilateral low back pain without sciatica    Lumbar disc disease with radiculopathy    Lumbar spondylosis    Pain in both lower extremities     Past Medical History:   Diagnosis Date    A-fib ([place] [address] )     Diabetes ([place] [address] )     Epistaxis     Hypertension     Sleep apnea      Social History     Socioeconomic History    Marital status: /Civil Union     Spouse name: Not on file    Number of children: Not on file    Years of education: Not on file    Highest education level: Not on file   Occupational History    Not on file   Tobacco Use    Smoking status: Never Smoker    Smokeless tobacco: Never Used   Vaping Use    Vaping Use: Never used   Substance and Sexual Activity    Alcohol use: Yes     Comment: social    Drug use: No    Sexual activity: Not on file   Other Topics Concern    Not on file   Social History Narrative    Not on file     Social Determinants of Health     Financial Resource Strain: Not on file   Food Insecurity: Not on file   Transportation Needs: Not on file   Physical Activity: Not on file   Stress: Not on file   Social Connections: Not on file   Intimate Partner Violence: Not on file   Housing Stability: Not on file      Family History   Problem Relation Age of Onset    Diabetes Mother     Diabetes Father     Atrial fibrillation Father     Heart attack Father      Past Surgical History:   Procedure Laterality Date    HYSTERECTOMY         Current Outpatient Medications:     Blood Glucose Monitoring Suppl (ONE TOUCH ULTRA 2) w/Device KIT, use as directed by prescriber twice a day, Disp: , Rfl:     diltiazem (CARDIZEM CD) 240 mg 24 hr capsule, Take 1 capsule (240 mg total) by mouth daily, Disp: 90 capsule, Rfl: 3    Jardiance 10 MG TABS, Take 10 mg by mouth daily, Disp: , Rfl:     Lancets (OneTouch Delica Plus [id number]) MISC, use as directed by prescriber twice a day, Disp: , Rfl:     lisinopril (ZESTRIL) 20 mg tablet, Take 1 tablet (20 mg total) by mouth daily, Disp: 90 tablet, Rfl: 3    metoprolol succinate (TOPROL-XL) 50 mg 24 hr tablet, Take 1 tablet (50 mg total) by mouth daily, Disp: 90 tablet, Rfl: 3    OneTouch Ultra test strip, use as directed by prescriber twice a day, Disp: , Rfl:     rivaroxaban (Xarelto) 20 mg tablet, Take 1 tablet (20 mg total) by mouth daily, Disp: 90 tablet, Rfl: 3  Allergies   Allergen Reactions    Codeine     Other Other (See Comments)    Sulfa Antibiotics      Vitals:    06/08/22 0953   BP: 126/66   BP Location: Left arm   Patient Position: Sitting Cuff Size: Standard   Pulse: 79   Weight: 80 3 kg (177 lb)   Height: 5' 7" (1 702 m)       Labs:  Lab Results   Component Value Date    K 3 6 06/28/2021     06/28/2021    CO2 27 06/28/2021    BUN 13 06/28/2021    CREATININE 0 77 06/28/2021    CALCIUM 8 7 06/28/2021     Lab Results   Component Value Date    WBC 7 21 06/28/2021    HGB 14 0 06/28/2021    HCT 42 0 06/28/2021    MCV 90 06/28/2021     06/28/2021     Lab Results   Component Value Date    TRIG 123 12/16/2020    HDL 41 12/16/2020     Imaging:  ECG today shows atrial fibrillation with a controlled ventricular response  ECHO (8/2021):  LEFT VENTRICLE:  Systolic function was vigorous  Ejection fraction was estimated to be 65 %  There were no regional wall motion abnormalities  Wall thickness was mildly increased      LEFT ATRIUM:  The atrium was markedly dilated      RIGHT ATRIUM:  The atrium was moderately dilated      MITRAL VALVE:  There was mild to moderate annular calcification  There was mild to moderate regurgitation      TRICUSPID VALVE:  There was trace regurgitation  Review of Systems:  Review of Systems   Constitutional: Positive for fatigue  HENT: Negative  Eyes: Negative  Respiratory: Negative  Cardiovascular: Negative  Gastrointestinal: Negative  Musculoskeletal: Negative  Skin: Negative  Allergic/Immunologic: Negative  Neurological: Negative  Hematological: Negative  Psychiatric/Behavioral: Negative  All other systems reviewed and are negative  Vitals:    06/08/22 0953   BP: 126/66   BP Location: Left arm   Patient Position: Sitting   Cuff Size: Standard   Pulse: 79   Weight: 80 3 kg (177 lb)   Height: 5' 7" (1 702 m)       Physical Exam:  Physical Exam  Vitals and nursing note reviewed  Constitutional:       Appearance: She is well-developed  HENT:      Head: Normocephalic and atraumatic  Eyes:      General: No scleral icterus  Right eye: No discharge           Left eye: No discharge  Pupils: Pupils are equal, round, and reactive to light  Neck:      Thyroid: No thyromegaly  Vascular: No JVD  Cardiovascular:      Rate and Rhythm: Normal rate  Rhythm irregularly irregular  No extrasystoles are present  Pulses: Normal pulses  No decreased pulses  Heart sounds: Normal heart sounds, S1 normal and S2 normal  No murmur heard  No friction rub  No gallop  Pulmonary:      Effort: Pulmonary effort is normal  No respiratory distress  Breath sounds: Normal breath sounds  No wheezing, rhonchi or rales  Abdominal:      General: Bowel sounds are normal  There is no distension  Palpations: Abdomen is soft  Tenderness: There is no abdominal tenderness  Musculoskeletal:         General: No tenderness or deformity  Normal range of motion  Cervical back: Normal range of motion and neck supple  Skin:     General: Skin is warm and dry  Findings: No rash  Neurological:      Mental Status: She is alert and oriented to person, place, and time  Cranial Nerves: No cranial nerve deficit  Psychiatric:         Thought Content: Thought content normal          Judgment: Judgment normal        Counseling / Coordination of Care  Total office time spent today 25 minutes  Greater than 50% of total time was spent with the patient and / or family counseling and / or coordination of care

## 2022-06-24 ENCOUNTER — APPOINTMENT (OUTPATIENT)
Dept: LAB | Facility: CLINIC | Age: 82
End: 2022-06-24
Payer: COMMERCIAL

## 2022-06-24 DIAGNOSIS — L30.9 ACUTE DERMATITIS: ICD-10-CM

## 2022-06-24 DIAGNOSIS — I48.91 ATRIAL FIBRILLATION, UNSPECIFIED TYPE (HCC): ICD-10-CM

## 2022-06-24 DIAGNOSIS — E11.69 DIABETES MELLITUS ASSOCIATED WITH HORMONAL ETIOLOGY (HCC): ICD-10-CM

## 2022-06-24 LAB
ALBUMIN SERPL BCP-MCNC: 3.6 G/DL (ref 3.5–5)
ALP SERPL-CCNC: 51 U/L (ref 46–116)
ALT SERPL W P-5'-P-CCNC: 22 U/L (ref 12–78)
ANION GAP SERPL CALCULATED.3IONS-SCNC: 7 MMOL/L (ref 4–13)
AST SERPL W P-5'-P-CCNC: 12 U/L (ref 5–45)
BILIRUB SERPL-MCNC: 0.62 MG/DL (ref 0.2–1)
BUN SERPL-MCNC: 22 MG/DL (ref 5–25)
CALCIUM SERPL-MCNC: 9.2 MG/DL (ref 8.3–10.1)
CHLORIDE SERPL-SCNC: 108 MMOL/L (ref 100–108)
CHOLEST SERPL-MCNC: 170 MG/DL
CO2 SERPL-SCNC: 25 MMOL/L (ref 21–32)
CREAT SERPL-MCNC: 0.72 MG/DL (ref 0.6–1.3)
EST. AVERAGE GLUCOSE BLD GHB EST-MCNC: 131 MG/DL
GFR SERPL CREATININE-BSD FRML MDRD: 78 ML/MIN/1.73SQ M
GLUCOSE P FAST SERPL-MCNC: 150 MG/DL (ref 65–99)
HBA1C MFR BLD: 6.2 %
HDLC SERPL-MCNC: 44 MG/DL
LDLC SERPL CALC-MCNC: 110 MG/DL (ref 0–100)
NONHDLC SERPL-MCNC: 126 MG/DL
POTASSIUM SERPL-SCNC: 3.7 MMOL/L (ref 3.5–5.3)
PROT SERPL-MCNC: 6.9 G/DL (ref 6.4–8.2)
SODIUM SERPL-SCNC: 140 MMOL/L (ref 136–145)
TRIGL SERPL-MCNC: 78 MG/DL

## 2022-06-24 PROCEDURE — 80053 COMPREHEN METABOLIC PANEL: CPT

## 2022-06-24 PROCEDURE — 36415 COLL VENOUS BLD VENIPUNCTURE: CPT

## 2022-06-24 PROCEDURE — 80061 LIPID PANEL: CPT

## 2022-06-24 PROCEDURE — 83036 HEMOGLOBIN GLYCOSYLATED A1C: CPT

## 2022-11-10 DIAGNOSIS — I48.19 PERSISTENT ATRIAL FIBRILLATION (HCC): ICD-10-CM

## 2022-11-11 RX ORDER — METOPROLOL SUCCINATE 50 MG/1
TABLET, EXTENDED RELEASE ORAL
Qty: 90 TABLET | Refills: 3 | Status: SHIPPED | OUTPATIENT
Start: 2022-11-11

## 2022-11-11 RX ORDER — DILTIAZEM HYDROCHLORIDE 240 MG/1
CAPSULE, COATED, EXTENDED RELEASE ORAL
Qty: 90 CAPSULE | Refills: 3 | Status: SHIPPED | OUTPATIENT
Start: 2022-11-11

## 2022-12-30 ENCOUNTER — APPOINTMENT (OUTPATIENT)
Dept: LAB | Facility: CLINIC | Age: 82
End: 2022-12-30

## 2022-12-30 DIAGNOSIS — E11.69 DIABETES MELLITUS ASSOCIATED WITH HORMONAL ETIOLOGY (HCC): ICD-10-CM

## 2022-12-30 DIAGNOSIS — I48.91 ATRIAL FIBRILLATION, UNSPECIFIED TYPE (HCC): ICD-10-CM

## 2022-12-30 DIAGNOSIS — E78.2 MIXED HYPERLIPIDEMIA: ICD-10-CM

## 2022-12-30 LAB
ALBUMIN SERPL BCP-MCNC: 3.5 G/DL (ref 3.5–5)
ALP SERPL-CCNC: 50 U/L (ref 46–116)
ALT SERPL W P-5'-P-CCNC: 18 U/L (ref 12–78)
ANION GAP SERPL CALCULATED.3IONS-SCNC: 3 MMOL/L (ref 4–13)
AST SERPL W P-5'-P-CCNC: 10 U/L (ref 5–45)
BILIRUB SERPL-MCNC: 0.83 MG/DL (ref 0.2–1)
BUN SERPL-MCNC: 17 MG/DL (ref 5–25)
CALCIUM SERPL-MCNC: 9 MG/DL (ref 8.3–10.1)
CHLORIDE SERPL-SCNC: 109 MMOL/L (ref 96–108)
CHOLEST SERPL-MCNC: 177 MG/DL
CO2 SERPL-SCNC: 28 MMOL/L (ref 21–32)
CREAT SERPL-MCNC: 0.79 MG/DL (ref 0.6–1.3)
GFR SERPL CREATININE-BSD FRML MDRD: 69 ML/MIN/1.73SQ M
GLUCOSE P FAST SERPL-MCNC: 142 MG/DL (ref 65–99)
HDLC SERPL-MCNC: 45 MG/DL
LDLC SERPL CALC-MCNC: 117 MG/DL (ref 0–100)
NONHDLC SERPL-MCNC: 132 MG/DL
POTASSIUM SERPL-SCNC: 4.4 MMOL/L (ref 3.5–5.3)
PROT SERPL-MCNC: 6.8 G/DL (ref 6.4–8.4)
SODIUM SERPL-SCNC: 140 MMOL/L (ref 135–147)
TRIGL SERPL-MCNC: 75 MG/DL

## 2022-12-31 LAB
EST. AVERAGE GLUCOSE BLD GHB EST-MCNC: 134 MG/DL
HBA1C MFR BLD: 6.3 %

## 2023-02-20 DIAGNOSIS — R04.0 EPISTAXIS: ICD-10-CM

## 2023-06-09 NOTE — PROGRESS NOTES
Assessment/Plan:    Lichen sclerosus  80 yo  new patient with chronic history of lichen sclerosis, now exacerbation with soreness, itching and redness for months  Ran out of prescribed clobetasol  Using goat's milk soap  History reviewed  Exam with generalized lichen sclerosis and erythema  S/p hysterectomy  Will rx clobetasol cream BID for three weeks, daily for 3 weeks and 2-3 times weekly until recheck  Discussed low risk of cancer in LS, encouraged recheck for response  Agrees to plan  Diagnoses and all orders for this visit:    Lichen sclerosus  -     betamethasone dipropionate (DIPROSONE) 0 05 % cream; Pea sized external application twice daily for 3 weeks, then daily for 3 weeks, then every other day  Encounter for screening mammogram for malignant neoplasm of breast          Subjective:      Patient ID: Gonsalo Maldonado is a 79 y o  female  HPI here with exacerbation of chronic lichen sclerosis    The following portions of the patient's history were reviewed and updated as appropriate:   She  has a past medical history of A-fib ([facility] [address] ), BMI 30 0-30 9,adult, Chronic pain, Coronary artery disease (), Epistaxis, Fibroid, Hypertension, Lumbar spondylosis, Sleep apnea, and Type 2 DM    She   Patient Active Problem List    Diagnosis Date Noted   • Lichen sclerosus    • Chronic pain syndrome 2021   • Chronic bilateral low back pain without sciatica 2021   • Lumbar disc disease with radiculopathy 2021   • Lumbar spondylosis 2021   • Pain in both lower extremities 2021   • Persistent atrial fibrillation ([facility] [address] ) 2021   • Long term current use of anticoagulant 08/10/2021   • Allergic rhinitis 08/10/2021   • Type 2 diabetes mellitus with other specified complication ([facility] [address] )    • Mixed hyperlipidemia 08/10/2021   • Morbid obesity ([address] ) 2021   • Obesity (BMI 30 0-34 9) 2021   • MISHA (obstructive sleep apnea) 2021 "  • Epistaxis 06/27/2021   • Hypertension    • Closed nondisplaced fracture of navicular bone of left foot 04/20/2017     She  has a past surgical history that includes Hysterectomy; Dilation and evacuation; Colonoscopy (2020); Mammo (historical); and DXA procedure(historical)  Her family history includes Atrial fibrillation in her father; Diabetes in her father, mother, and paternal grandmother; Heart attack in her father  She  reports that she has never smoked  She has never used smokeless tobacco  She reports current alcohol use  She reports that she does not use drugs  Current Outpatient Medications   Medication Sig Dispense Refill   • betamethasone dipropionate (DIPROSONE) 0 05 % cream Pea sized external application twice daily for 3 weeks, then daily for 3 weeks, then every other day  30 g 2   • Blood Glucose Monitoring Suppl (ONE TOUCH ULTRA 2) w/Device KIT use as directed by prescriber twice a day     • diltiazem (CARDIZEM CD) 240 mg 24 hr capsule TAKE 1 CAPSULE DAILY 90 capsule 3   • Jardiance 10 MG TABS Take 10 mg by mouth daily     • Lancets (OneTouch Delica Plus [id number]) MISC use as directed by prescriber twice a day     • lisinopril (ZESTRIL) 20 mg tablet Take 1 tablet (20 mg total) by mouth daily 90 tablet 3   • metoprolol succinate (TOPROL-XL) 50 mg 24 hr tablet TAKE 1 TABLET DAILY 90 tablet 3   • OneTouch Ultra test strip use as directed by prescriber twice a day     • rivaroxaban (Xarelto) 20 mg tablet Take 1 tablet (20 mg total) by mouth daily 90 tablet 3     No current facility-administered medications for this visit  She is allergic to codeine, other, and sulfa antibiotics       Review of Systems  +itching, soreness, redness     No vaginal bleeding  No pain, early satiety, bloating    Objective:      /78 (BP Location: Left arm, Patient Position: Sitting, Cuff Size: Standard)   Ht 5' 4 5\" (1 638 m)   Wt 81 2 kg (179 lb)   Breastfeeding No   BMI 30 25 kg/m²          Physical Exam  " General appearance: no distress, pleasant  Grieving (recent loss of )  Pelvic exam: lichen sclerosis pale white epithelium from periclitoral, bilateral medial labia, perineal body and perianal with erythema  No raised lesions, no ecchymosis or bleeding   Normaal urethral meatus normal, vagina atrophic without lesions, cuff intact, no adnexal masses, non tender  Rectal exam: normal sphincter tone, no masses, RV confirms above

## 2023-06-12 ENCOUNTER — OFFICE VISIT (OUTPATIENT)
Dept: OBGYN CLINIC | Facility: CLINIC | Age: 83
End: 2023-06-12
Payer: COMMERCIAL

## 2023-06-12 VITALS
HEIGHT: 65 IN | SYSTOLIC BLOOD PRESSURE: 112 MMHG | WEIGHT: 179 LBS | BODY MASS INDEX: 29.82 KG/M2 | DIASTOLIC BLOOD PRESSURE: 78 MMHG

## 2023-06-12 DIAGNOSIS — Z12.31 ENCOUNTER FOR SCREENING MAMMOGRAM FOR MALIGNANT NEOPLASM OF BREAST: ICD-10-CM

## 2023-06-12 DIAGNOSIS — L90.0 LICHEN SCLEROSUS: Primary | ICD-10-CM

## 2023-06-12 PROCEDURE — 99204 OFFICE O/P NEW MOD 45 MIN: CPT | Performed by: OBSTETRICS & GYNECOLOGY

## 2023-06-12 RX ORDER — BETAMETHASONE DIPROPIONATE 0.5 MG/G
CREAM TOPICAL
Qty: 30 G | Refills: 2 | Status: SHIPPED | OUTPATIENT
Start: 2023-06-12

## 2023-06-12 NOTE — ASSESSMENT & PLAN NOTE
78 yo  new patient with chronic history of lichen sclerosis, now exacerbation with soreness, itching and redness for months  Ran out of prescribed clobetasol  Using goat's milk soap  History reviewed  Exam with generalized lichen sclerosis and erythema  S/p hysterectomy  Will rx clobetasol cream BID for three weeks, daily for 3 weeks and 2-3 times weekly until recheck  Discussed low risk of cancer in LS, encouraged recheck for response  Agrees to plan

## 2023-06-12 NOTE — LETTER
2023     Tatum Ragsdale MD  [address]    Patient: Arianna Austin   YOB: 1940   Date of Visit: 2023       Dear Dr Edson Serrato: Thank you for referring Benjamin Thornton to me for evaluation  Below are my notes for this consultation  If you have questions, please do not hesitate to call me  I look forward to following your patient along with you  Sincerely,        Beckie Gallagher MD        CC: No Recipients    Beckie Gallagher MD  2023  2:49 PM  Sign when Signing Visit  Assessment/Plan:    Lichen sclerosus  80 yo  new patient with chronic history of lichen sclerosis, now exacerbation with soreness, itching and redness for months  Ran out of prescribed clobetasol  Using goat's milk soap  History reviewed  Exam with generalized lichen sclerosis and erythema  S/p hysterectomy  Will rx clobetasol cream BID for three weeks, daily for 3 weeks and 2-3 times weekly until recheck  Discussed low risk of cancer in LS, encouraged recheck for response  Agrees to plan  Diagnoses and all orders for this visit:    Lichen sclerosus  -     betamethasone dipropionate (DIPROSONE) 0 05 % cream; Pea sized external application twice daily for 3 weeks, then daily for 3 weeks, then every other day  Encounter for screening mammogram for malignant neoplasm of breast         Subjective:     Patient ID: Arianna Austin is a 79 y o  female  HPI here with exacerbation of chronic lichen sclerosis    The following portions of the patient's history were reviewed and updated as appropriate:   She  has a past medical history of A-fib ([address] ), BMI 30 0-30 9,adult, Chronic pain, Coronary artery disease (), Epistaxis, Fibroid, Hypertension, Lumbar spondylosis, Sleep apnea, and Type 2 DM    She   Patient Active Problem List    Diagnosis Date Noted   • Lichen sclerosus    • Chronic pain syndrome 2021   • Chronic bilateral low back pain without sciatica 09/14/2021   • Lumbar disc disease with radiculopathy 09/14/2021   • Lumbar spondylosis 09/14/2021   • Pain in both lower extremities 09/14/2021   • Persistent atrial fibrillation ([facility] ) 09/08/2021   • Long term current use of anticoagulant 08/10/2021   • Allergic rhinitis 08/10/2021   • Type 2 diabetes mellitus with other specified complication ([place] [address] ) 80/85/4249   • Mixed hyperlipidemia 08/10/2021   • Morbid obesity ([place] [address] ) 07/21/2021   • Obesity (BMI 30 0-34 9) 07/16/2021   • MISHA (obstructive sleep apnea) 07/16/2021   • Epistaxis 06/27/2021   • Hypertension    • Closed nondisplaced fracture of navicular bone of left foot 04/20/2017     She  has a past surgical history that includes Hysterectomy; Dilation and evacuation; Colonoscopy (2020); Mammo (historical); and DXA procedure(historical)  Her family history includes Atrial fibrillation in her father; Diabetes in her father, mother, and paternal grandmother; Heart attack in her father  She  reports that she has never smoked  She has never used smokeless tobacco  She reports current alcohol use  She reports that she does not use drugs  Current Outpatient Medications   Medication Sig Dispense Refill   • betamethasone dipropionate (DIPROSONE) 0 05 % cream Pea sized external application twice daily for 3 weeks, then daily for 3 weeks, then every other day   30 g 2   • Blood Glucose Monitoring Suppl (ONE TOUCH ULTRA 2) w/Device KIT use as directed by prescriber twice a day     • diltiazem (CARDIZEM CD) 240 mg 24 hr capsule TAKE 1 CAPSULE DAILY 90 capsule 3   • Jardiance 10 MG TABS Take 10 mg by mouth daily     • Lancets (OneTouch Delica Plus [id number]) MISC use as directed by prescriber twice a day     • lisinopril (ZESTRIL) 20 mg tablet Take 1 tablet (20 mg total) by mouth daily 90 tablet 3   • metoprolol succinate (TOPROL-XL) 50 mg 24 hr tablet TAKE 1 TABLET DAILY 90 tablet 3   • OneTouch Ultra test strip use as directed by prescriber twice a day "   • rivaroxaban (Xarelto) 20 mg tablet Take 1 tablet (20 mg total) by mouth daily 90 tablet 3     No current facility-administered medications for this visit  She is allergic to codeine, other, and sulfa antibiotics       Review of Systems +itching, soreness, redness  No vaginal bleeding  No pain, early satiety, bloating    Objective:      /78 (BP Location: Left arm, Patient Position: Sitting, Cuff Size: Standard)   Ht 5' 4 5\" (1 638 m)   Wt 81 2 kg (179 lb)   Breastfeeding No   BMI 30 25 kg/m²         Physical Exam   General appearance: no distress, pleasant  Grieving (recent loss of )  Pelvic exam: lichen sclerosis pale white epithelium from periclitoral, bilateral medial labia, perineal body and perianal with erythema  No raised lesions, no ecchymosis or bleeding   Normaal urethral meatus normal, vagina atrophic without lesions, cuff intact, no adnexal masses, non tender  Rectal exam: normal sphincter tone, no masses, RV confirms above    "

## 2023-06-12 NOTE — PATIENT INSTRUCTIONS
Discussed with patient perineal hygiene including the avoidance of pads, toilet wipes, loofahs, and over the counter products  Encouraged cotton only underwear and mild soap (Dove unscented) no more than once per day  Aquafor or Vitamin A&D ointment can be used daily as a barrier

## 2023-06-26 ENCOUNTER — APPOINTMENT (OUTPATIENT)
Dept: LAB | Facility: CLINIC | Age: 83
End: 2023-06-26
Payer: COMMERCIAL

## 2023-06-26 DIAGNOSIS — E11.69 DIABETES MELLITUS ASSOCIATED WITH HORMONAL ETIOLOGY (HCC): ICD-10-CM

## 2023-06-26 DIAGNOSIS — D68.59 PRIMARY HYPERCOAGULABLE STATE (HCC): ICD-10-CM

## 2023-06-26 DIAGNOSIS — E66.01 MORBID OBESITY (HCC): ICD-10-CM

## 2023-06-26 DIAGNOSIS — I48.91 ATRIAL FIBRILLATION, UNSPECIFIED TYPE (HCC): ICD-10-CM

## 2023-06-26 LAB
ALBUMIN SERPL BCP-MCNC: 3.7 G/DL (ref 3.5–5)
ALP SERPL-CCNC: 51 U/L (ref 46–116)
ALT SERPL W P-5'-P-CCNC: 26 U/L (ref 12–78)
ANION GAP SERPL CALCULATED.3IONS-SCNC: 5 MMOL/L
AST SERPL W P-5'-P-CCNC: 13 U/L (ref 5–45)
BILIRUB SERPL-MCNC: 0.68 MG/DL (ref 0.2–1)
BUN SERPL-MCNC: 19 MG/DL (ref 5–25)
CALCIUM SERPL-MCNC: 9.2 MG/DL (ref 8.3–10.1)
CHLORIDE SERPL-SCNC: 111 MMOL/L (ref 96–108)
CHOLEST SERPL-MCNC: 201 MG/DL
CO2 SERPL-SCNC: 26 MMOL/L (ref 21–32)
CREAT SERPL-MCNC: 0.77 MG/DL (ref 0.6–1.3)
EST. AVERAGE GLUCOSE BLD GHB EST-MCNC: 140 MG/DL
GFR SERPL CREATININE-BSD FRML MDRD: 71 ML/MIN/1.73SQ M
GLUCOSE P FAST SERPL-MCNC: 137 MG/DL (ref 65–99)
HBA1C MFR BLD: 6.5 %
HDLC SERPL-MCNC: 49 MG/DL
LDLC SERPL CALC-MCNC: 135 MG/DL (ref 0–100)
NONHDLC SERPL-MCNC: 152 MG/DL
POTASSIUM SERPL-SCNC: 4.2 MMOL/L (ref 3.5–5.3)
PROT SERPL-MCNC: 6.7 G/DL (ref 6.4–8.4)
SODIUM SERPL-SCNC: 142 MMOL/L (ref 135–147)
TRIGL SERPL-MCNC: 85 MG/DL

## 2023-06-26 PROCEDURE — 80053 COMPREHEN METABOLIC PANEL: CPT

## 2023-06-26 PROCEDURE — 36415 COLL VENOUS BLD VENIPUNCTURE: CPT

## 2023-06-26 PROCEDURE — 83036 HEMOGLOBIN GLYCOSYLATED A1C: CPT

## 2023-06-26 PROCEDURE — 80061 LIPID PANEL: CPT

## 2023-07-12 ENCOUNTER — OFFICE VISIT (OUTPATIENT)
Dept: CARDIOLOGY CLINIC | Facility: CLINIC | Age: 83
End: 2023-07-12
Payer: COMMERCIAL

## 2023-07-12 VITALS
DIASTOLIC BLOOD PRESSURE: 72 MMHG | BODY MASS INDEX: 30.25 KG/M2 | HEART RATE: 76 BPM | HEIGHT: 64 IN | SYSTOLIC BLOOD PRESSURE: 112 MMHG | WEIGHT: 177.2 LBS

## 2023-07-12 DIAGNOSIS — G47.33 OSA (OBSTRUCTIVE SLEEP APNEA): ICD-10-CM

## 2023-07-12 DIAGNOSIS — E78.2 MIXED HYPERLIPIDEMIA: ICD-10-CM

## 2023-07-12 DIAGNOSIS — E11.69 TYPE 2 DIABETES MELLITUS WITH OTHER SPECIFIED COMPLICATION, WITHOUT LONG-TERM CURRENT USE OF INSULIN (HCC): ICD-10-CM

## 2023-07-12 DIAGNOSIS — I48.19 PERSISTENT ATRIAL FIBRILLATION (HCC): Primary | ICD-10-CM

## 2023-07-12 DIAGNOSIS — I10 PRIMARY HYPERTENSION: ICD-10-CM

## 2023-07-12 PROCEDURE — 93000 ELECTROCARDIOGRAM COMPLETE: CPT | Performed by: INTERNAL MEDICINE

## 2023-07-12 PROCEDURE — 99214 OFFICE O/P EST MOD 30 MIN: CPT | Performed by: INTERNAL MEDICINE

## 2023-07-12 NOTE — PATIENT INSTRUCTIONS
A-fib (Atrial Fibrillation)   AMBULATORY CARE:   Atrial fibrillation (A-fib)  is an irregular heartbeat. It reduces your heart's ability to pump blood through your body. A-fib may come and go, or it may be a long-term condition. A-fib can cause life-threatening blood clots, stroke, or heart failure. It is important to treat and manage A-fib to help prevent these problems. Common signs and symptoms include the following:   A heartbeat that races, pounds, or flutters    Weakness, severe tiredness, or confusion    Feeling lightheaded, sweaty, dizzy, or faint    Shortness of breath or anxiety    Chest pain or pressure    Call your local emergency number (911 in the [address]) or have someone call if:   You have any of the following signs of a heart attack:      Squeezing, pressure, or pain in your chest    You may  also have any of the following:     Discomfort or pain in your back, neck, jaw, stomach, or arm    Shortness of breath    Nausea or vomiting    Lightheadedness or a sudden cold sweat    You have any of the following signs of a stroke:      Numbness or drooping on one side of your face     Weakness in an arm or leg    Confusion or difficulty speaking    Dizziness, a severe headache, or vision loss    Seek immediate care if:   Your arm or leg feels warm, tender, and painful. It may look swollen and red. Your heart rate is more than 110 beats per minute. You are short of breath, even at rest.    Call your doctor or cardiologist if:   You have new or worsening swelling in your legs, feet, ankles, or abdomen. You have questions or concerns about your condition or care. Treatment for A-fib:  Conditions that cause A-fib, such as thyroid disease, will be treated. You may also need any of the following:  Heart medicines  help control your heart rate or rhythm. You may need more than one medicine to treat your symptoms.     Antiplatelet or blood thinner medicines  help prevent blood clots and stroke. Cardioversion  is a procedure to return your heart rate and rhythm to normal. It can be done using medicines or electric shock. A-fib ablation  is a procedure that uses energy to burn a small area of heart tissue. This creates scar tissue and prevents electrical signals that cause A-fib. You may need this procedure more than once. Ask for more information on A-fib ablation. A pacemaker  may be inserted into your heart. A pacemaker is a device that controls your heartbeat. A pacemaker may be inserted during an ablation procedure or surgery. Ask your healthcare provider for more information on pacemakers. Surgery  may be needed if other procedures do not work. During surgery your healthcare provider will make cuts in the upper part of your heart. The provider will stitch the cuts together to create scar tissue. The scar tissue will prevent electrical signals that cause A-fib. Manage or prevent A-fib:   Get screening for A-fib, if recommended. Screening means you are checked for A-fib, even if you do not have signs or symptoms. Screening can find problems early so treatment can begin. Early treatment can save your life. Your healthcare provider will talk to you about the benefits and risks of screening. Screening may be recommended starting at age 48. Your provider may recommend regular screenings if you stay at high risk for A-fib. Know your target heart rate. Learn how to check your pulse and monitor your heart rate. Know the risks if you choose to drink alcohol. Alcohol can increase your risk for A-fib or make A-fib harder to manage. Ask your healthcare provider if it is okay for you to drink any alcohol. He or she can help you set limits for the number of drinks you have in 24 hours and in a week. A drink of alcohol is 12 ounces of beer, 5 ounces of wine, or 1½ ounces of liquor. Do not smoke.   Nicotine can cause heart damage and make it more difficult to manage your A-fib. Do not use e-cigarettes or smokeless tobacco in place of cigarettes or to help you quit. They still contain nicotine. Ask your healthcare provider for information if you currently smoke and need help quitting. Eat heart-healthy foods. Heart healthy foods will help keep your cholesterol low. These include fruits, vegetables, whole-grain breads, low-fat dairy products, beans, lean meats, and fish. Replace butter and margarine with heart-healthy oils such as olive oil and canola oil. Maintain a healthy weight. Ask your healthcare provider what a healthy weight is for you. Ask him or her to help you create a safe weight loss plan if needed. Even a small goal of a 10% weight loss can improve your heart health. Get regular physical activity. Physical activity helps improve your heart health. Get at least 150 minutes of moderate aerobic physical activity each week. Your healthcare provider can help you create an activity plan. Manage other health conditions. This includes high blood pressure or cholesterol, sleep apnea, diabetes, and other heart conditions. Take medicine as directed and follow your treatment plan. Your healthcare provider may need to change a medicine you are taking if it is causing your A-fib. Do not  stop taking any medicine unless directed by your provider. Follow up with your doctor or cardiologist as directed: You will need regular blood tests and monitoring. Write down your questions so you remember to ask them during your visits. © Copyright [employer] 2022 Information is for End User's use only and may not be sold, redistributed or otherwise used for commercial purposes. The above information is an  only. It is not intended as medical advice for individual conditions or treatments. Talk to your doctor, nurse or pharmacist before following any medical regimen to see if it is safe and effective for you.

## 2023-07-12 NOTE — PROGRESS NOTES
Cardiology Follow-up    Dipika Stafford  [id number]  1940  [facility] [place]  [address]  [place] [address] [phone]    1. Persistent atrial fibrillation (HCC)  POCT ECG      2. Primary hypertension        3. MISHA (obstructive sleep apnea)        4. Mixed hyperlipidemia        5. Type 2 diabetes mellitus with other specified complication, without long-term current use of insulin (HCC)            Discussion/Summary:    1. Persistent atrial fibrillation - I have discussed with Cortez Sarmiento in the past both a rate and rhythm control strategy. I stated that I would not recommend ablation, given her age, lack of symptoms and significant biatrial enlargement. A cardioversion is an option however antiarrhythmic therapy would need to be restarted. She was curious whether or not this would help her fatigue. I am skeptical on this but we would not know without trying a cardioversion. But I would have to start amiodarone prior to this. We discussed the risks and benefits of doing a rhythm control strategy versus continuing rate control strategy, and she wanted to continue to think about this. She will remain on the same doses of diltiazem and metoprolol, and she is on Xarelto for stroke prevention. We did order an updated echocardiogram.    2.  Hypertension - Her blood pressure is under good control with Toprol-XL, diltiazem and lisinopril. She should periodically check her blood pressure at home. 3.  Obstructive sleep apnea - She is compliant on CPAP. We will see her back in 1 year. HPI:  Mrs. Mike Gavin comes in for follow-up regarding her persistent atrial fibrillation along with her history of hypertension. Cortez Sarmiento followed with [facility] for years given paroxysmal atrial fibrillation, which was maintained in sinus rhythm on sotalol antiarrhythmic therapy.   Just prior to our last visit she was found to be back in atrial fibrillation. She was seen during hospitalization last month in which he had recurrent nosebleed and was found to be in rapid ventricular response. Diltiazem was added to her regimen in addition to her sotalol. Her heart rates were under better control. She met with electrophysiology as discussed repeat cardioversion, ongoing antiarrhythmic therapy, ablation and a rate control strategy. At prior visits we discussed this as well, comparing rate and rhythm control strategies, and she chose just do a rate control strategy. At that point we stopped the sotalol and up titrated her diltiazem. Sarita Freeman wore a extended ambulatory Holter which did show periods of rapid atrial fibrillation. At that point Toprol-XL 50 mg daily was added. Her heart rates are now under better control. She persist in atrial fibrillation today. Fortunately Sarita Freeman is for the most part asymptomatic from a cardiac standpoint. She does have ongoing issues with chronic fatigue, which is certainly multifactorial, but she has been contemplating whether or not a rhythm control strategy would make her feel better. She denies chest pains or any symptoms of angina. No palpitations, lightheadedness or any syncope. She does not feel her atrial fibrillation at all. No signs or symptoms of CHF. She did have an echocardiogram during hospitalization that showed normal LV systolic function with significant left atrial enlargement. Patient Active Problem List   Diagnosis   • Epistaxis   • Hypertension   • Morbid obesity ([address])   • Long term current use of anticoagulant   • Allergic rhinitis   • Closed nondisplaced fracture of navicular bone of left foot   • Type 2 diabetes mellitus with other specified complication ([address])   • Mixed hyperlipidemia   • Obesity (BMI 30.0-34. 9)   • MISHA (obstructive sleep apnea)   • Persistent atrial fibrillation (HCC)   • Chronic pain syndrome   • Chronic bilateral low back pain without sciatica   • Lumbar disc disease with radiculopathy   • Lumbar spondylosis   • Pain in both lower extremities   • Lichen sclerosus     Past Medical History:   Diagnosis Date   • A-fib ([facility])    • BMI 30.0-30.9,adult    • Chronic pain    • Coronary artery disease 2021    A Fib   • Epistaxis    • Fibroid    • Hypertension    • Lumbar spondylosis    • Sleep apnea     CPAP   • Type 2 DM      Social History     Socioeconomic History   • Marital status:       Spouse name: Not on file   • Number of children: Not on file   • Years of education: Not on file   • Highest education level: Not on file   Occupational History   • Not on file   Tobacco Use   • Smoking status: Never   • Smokeless tobacco: Never   Vaping Use   • Vaping Use: Never used   Substance and Sexual Activity   • Alcohol use: Yes     Comment: socially   • Drug use: No   • Sexual activity: Not Currently     Partners: Male     Birth control/protection: Female Sterilization   Other Topics Concern   • Not on file   Social History Narrative   • Not on file     Social Determinants of Health     Financial Resource Strain: Not on file   Food Insecurity: Not on file   Transportation Needs: Not on file   Physical Activity: Not on file   Stress: Not on file   Social Connections: Not on file   Intimate Partner Violence: Not on file   Housing Stability: Not on file      Family History   Problem Relation Age of Onset   • Diabetes Mother    • Diabetes Father    • Atrial fibrillation Father    • Heart attack Father    • Diabetes Paternal Grandmother         Daughter   • Breast cancer Neg Hx    • Uterine cancer Neg Hx    • Ovarian cancer Neg Hx    • Colon cancer Neg Hx      Past Surgical History:   Procedure Laterality Date   • COLONOSCOPY  2020    no further   • DILATION AND EVACUATION     • DXA PROCEDURE (HISTORICAL)     • MAMMO (HISTORICAL)      2018   • TOTAL ABDOMINAL HYSTERECTOMY W/ BILATERAL SALPINGOOPHORECTOMY  1994    Fibroids - old records reviewed       Current Outpatient Medications:   •  betamethasone dipropionate (DIPROSONE) 0.05 % cream, Pea sized external application twice daily for 3 weeks, then daily for 3 weeks, then every other day., Disp: 30 g, Rfl: 2  •  Blood Glucose Monitoring Suppl (ONE TOUCH ULTRA 2) w/Device KIT, use as directed by prescriber twice a day, Disp: , Rfl:   •  diltiazem (CARDIZEM CD) 240 mg 24 hr capsule, TAKE 1 CAPSULE DAILY, Disp: 90 capsule, Rfl: 3  •  Jardiance 10 MG TABS, Take 10 mg by mouth daily, Disp: , Rfl:   •  Lancets (OneTouch Delica Plus [id number]) MISC, use as directed by prescriber twice a day, Disp: , Rfl:   •  lisinopril (ZESTRIL) 20 mg tablet, Take 1 tablet (20 mg total) by mouth daily, Disp: 90 tablet, Rfl: 3  •  metoprolol succinate (TOPROL-XL) 50 mg 24 hr tablet, TAKE 1 TABLET DAILY, Disp: 90 tablet, Rfl: 3  •  OneTouch Ultra test strip, use as directed by prescriber twice a day, Disp: , Rfl:   •  rivaroxaban (Xarelto) 20 mg tablet, Take 1 tablet (20 mg total) by mouth daily, Disp: 90 tablet, Rfl: 3  Allergies   Allergen Reactions   • Codeine    • Other Other (See Comments)   • Sulfa Antibiotics      Vitals:    07/12/23 1042   BP: 112/72   BP Location: Left arm   Patient Position: Sitting   Cuff Size: Standard   Pulse: 76   Weight: 80.4 kg (177 lb 3.2 oz)   Height: 5' 4" (1.626 m)       Labs:  Lab Results   Component Value Date    K 4.2 06/26/2023     (H) 06/26/2023    CO2 26 06/26/2023    BUN 19 06/26/2023    CREATININE 0.77 06/26/2023    CALCIUM 9.2 06/26/2023     Lab Results   Component Value Date    WBC 7.21 06/28/2021    HGB 14.0 06/28/2021    HCT 42.0 06/28/2021    MCV 90 06/28/2021     06/28/2021     Lab Results   Component Value Date    TRIG 85 06/26/2023    HDL 49 (L) 06/26/2023     Imaging:  ECG today shows atrial fibrillation with a controlled ventricular response. ECHO (8/2021):  LEFT VENTRICLE:  Systolic function was vigorous.  Ejection fraction was estimated to be 65 %.  There were no regional wall motion abnormalities. Wall thickness was mildly increased.     LEFT ATRIUM:  The atrium was markedly dilated.     RIGHT ATRIUM:  The atrium was moderately dilated.     MITRAL VALVE:  There was mild to moderate annular calcification. There was mild to moderate regurgitation.     TRICUSPID VALVE:  There was trace regurgitation. Review of Systems:  Review of Systems   Constitutional: Positive for fatigue. HENT: Negative. Eyes: Negative. Respiratory: Negative. Cardiovascular: Negative. Gastrointestinal: Negative. Musculoskeletal: Negative. Skin: Negative. Allergic/Immunologic: Negative. Neurological: Negative. Hematological: Negative. Psychiatric/Behavioral: Negative. All other systems reviewed and are negative. Vitals:    07/12/23 1042   BP: 112/72   BP Location: Left arm   Patient Position: Sitting   Cuff Size: Standard   Pulse: 76   Weight: 80.4 kg (177 lb 3.2 oz)   Height: 5' 4" (1.626 m)       Physical Exam:  Physical Exam  Vitals and nursing note reviewed. Constitutional:       Appearance: She is well-developed. HENT:      Head: Normocephalic and atraumatic. Eyes:      General: No scleral icterus. Right eye: No discharge. Left eye: No discharge. Pupils: Pupils are equal, round, and reactive to light. Neck:      Thyroid: No thyromegaly. Vascular: No JVD. Cardiovascular:      Rate and Rhythm: Normal rate. Rhythm irregularly irregular. No extrasystoles are present. Pulses: Normal pulses. No decreased pulses. Heart sounds: Normal heart sounds, S1 normal and S2 normal. No murmur heard. No friction rub. No gallop. Pulmonary:      Effort: Pulmonary effort is normal. No respiratory distress. Breath sounds: Normal breath sounds. No wheezing, rhonchi or rales. Abdominal:      General: Bowel sounds are normal. There is no distension. Palpations: Abdomen is soft. Tenderness:  There is no abdominal tenderness. Musculoskeletal:         General: No tenderness or deformity. Normal range of motion. Cervical back: Normal range of motion and neck supple. Skin:     General: Skin is warm and dry. Findings: No rash. Neurological:      Mental Status: She is alert and oriented to person, place, and time. Cranial Nerves: No cranial nerve deficit. Psychiatric:         Thought Content: Thought content normal.         Judgment: Judgment normal.       Counseling / Coordination of Care  Total office time spent today 25 minutes. Greater than 50% of total time was spent with the patient and / or family counseling and / or coordination of care.

## 2023-07-26 NOTE — PROGRESS NOTES
Assessment/Plan:    Lichen sclerosus  Here in follow up to 6 weeks of steroid therapy (betamethasone) for exacerbated lichen sclerosis. Pt reports continued pruritis and erythema. On exam resolved pale epithelium of LS and prominence of indurated erythema c/w candida. Pt with diabetes. LS responded to topical steroid. Candida prominent. Will decrease steroid application to twice weekly and initiate nystatin 1-2 times daily. Discussed correlation between blood glucose levels and vulvar candidiasis. Diagnoses and all orders for this visit:    Candidiasis  -     nystatin (MYCOSTATIN) cream; Pea sized external application 1-2 times daily as needed. Subjective:      Patient ID: Abeba Johns is a 79 y.o. female. HPI Here for f/u of lichen sclerosis. The following portions of the patient's history were reviewed and updated as appropriate:   She  has a past medical history of A-fib ([address]), BMI 30.0-30.9,adult, Chronic pain, Coronary artery disease (2021), Epistaxis, Fibroid, Hypertension, Lumbar spondylosis, Sleep apnea, and Type 2 DM.   She   Patient Active Problem List    Diagnosis Date Noted   • Lichen sclerosus 25/20/9018   • Chronic pain syndrome 09/14/2021   • Chronic bilateral low back pain without sciatica 09/14/2021   • Lumbar disc disease with radiculopathy 09/14/2021   • Lumbar spondylosis 09/14/2021   • Pain in both lower extremities 09/14/2021   • Persistent atrial fibrillation ([address]) 09/08/2021   • Long term current use of anticoagulant 08/10/2021   • Allergic rhinitis 08/10/2021   • Type 2 diabetes mellitus with other specified complication ([address]) 77/56/2217   • Mixed hyperlipidemia 08/10/2021   • Morbid obesity ([address]) 07/21/2021   • Obesity (BMI 30.0-34.9) 07/16/2021   • MISHA (obstructive sleep apnea) 07/16/2021   • Epistaxis 06/27/2021   • Hypertension    • Closed nondisplaced fracture of navicular bone of left foot 04/20/2017     She  has a past surgical history that includes Dilation and evacuation; Colonoscopy (2020); Mammo (historical); DXA procedure(historical); and Total abdominal hysterectomy w/ bilateral salpingoophorectomy (1994). Her family history includes Atrial fibrillation in her father; Diabetes in her father, mother, and paternal grandmother; Heart attack in her father. She  reports that she has never smoked. She has never used smokeless tobacco. She reports current alcohol use. She reports that she does not use drugs. Current Outpatient Medications   Medication Sig Dispense Refill   • betamethasone dipropionate (DIPROSONE) 0.05 % cream Pea sized external application twice daily for 3 weeks, then daily for 3 weeks, then every other day. 30 g 2   • Blood Glucose Monitoring Suppl (ONE TOUCH ULTRA 2) w/Device KIT use as directed by prescriber twice a day     • diltiazem (CARDIZEM CD) 240 mg 24 hr capsule TAKE 1 CAPSULE DAILY 90 capsule 3   • Jardiance 10 MG TABS Take 10 mg by mouth daily     • Lancets (OneTouch Delica Plus [id number]) MISC use as directed by prescriber twice a day     • lisinopril (ZESTRIL) 20 mg tablet Take 1 tablet (20 mg total) by mouth daily 90 tablet 3   • metoprolol succinate (TOPROL-XL) 50 mg 24 hr tablet TAKE 1 TABLET DAILY 90 tablet 3   • nystatin (MYCOSTATIN) cream Pea sized external application 1-2 times daily as needed. 30 g 3   • OneTouch Ultra test strip use as directed by prescriber twice a day     • rivaroxaban (Xarelto) 20 mg tablet Take 1 tablet (20 mg total) by mouth daily 90 tablet 3     No current facility-administered medications for this visit. She is allergic to codeine, other, and sulfa antibiotics. .    Review of Systems  +pruritis, no bleeding    Objective:      /74   Wt 80.7 kg (178 lb)   BMI 30.55 kg/m²          Physical Exam    Appears well, no apparent distress. Does not appear anxious or depressed.   Pelvic exam: erythematous, indurated bilateral labia minora and majora with erythema extending perianal. Resolution of lichen  pale epithelium seen in past from periclitoral area to perianal. Normal urethra

## 2023-07-27 ENCOUNTER — OFFICE VISIT (OUTPATIENT)
Dept: OBGYN CLINIC | Facility: CLINIC | Age: 83
End: 2023-07-27
Payer: COMMERCIAL

## 2023-07-27 VITALS — WEIGHT: 178 LBS | DIASTOLIC BLOOD PRESSURE: 74 MMHG | SYSTOLIC BLOOD PRESSURE: 126 MMHG | BODY MASS INDEX: 30.55 KG/M2

## 2023-07-27 DIAGNOSIS — B37.9 CANDIDIASIS: Primary | ICD-10-CM

## 2023-07-27 PROCEDURE — 99213 OFFICE O/P EST LOW 20 MIN: CPT | Performed by: OBSTETRICS & GYNECOLOGY

## 2023-07-27 RX ORDER — NYSTATIN 100000 U/G
CREAM TOPICAL
Qty: 30 G | Refills: 3 | Status: SHIPPED | OUTPATIENT
Start: 2023-07-27

## 2023-07-27 NOTE — LETTER
July 27, 2023     Kacie June MD  [address] [id number]    Patient: Elle Rodriguez   YOB: 1940   Date of Visit: 7/27/2023       Dear Dr. Landon Nance: Pancho Henderson was in today after six weeks of betamethasone therapy for lichen sclerosis. Below are my notes for this consultation. If you have questions, please do not hesitate to call me. I look forward to following your patient along with you. Sincerely,        Janusz Holguin MD        CC: No Recipients    Janusz Holguin MD  7/27/2023  4:22 PM  Sign when Signing Visit  Assessment/Plan:    Lichen sclerosus  Here in follow up to 6 weeks of steroid therapy (betamethasone) for exacerbated lichen sclerosis. Pt reports continued pruritis and erythema. On exam resolved pale epithelium of LS and prominence of indurated erythema c/w candida. Pt with diabetes. LS responded to topical steroid. Candida prominent. Will decrease steroid application to twice weekly and initiate nystatin 1-2 times daily. Discussed correlation between blood glucose levels and vulvar candidiasis. Diagnoses and all orders for this visit:    Candidiasis  -     nystatin (MYCOSTATIN) cream; Pea sized external application 1-2 times daily as needed. Subjective:     Patient ID: Elle Rodriguez is a 79 y.o. female. HPI Here for f/u of lichen sclerosis. The following portions of the patient's history were reviewed and updated as appropriate:   She  has a past medical history of A-fib ([address] [facility]), BMI 30.0-30.9,adult, Chronic pain, Coronary artery disease (2021), Epistaxis, Fibroid, Hypertension, Lumbar spondylosis, Sleep apnea, and Type 2 DM.   She   Patient Active Problem List    Diagnosis Date Noted   • Lichen sclerosus 10/62/1400   • Chronic pain syndrome 09/14/2021   • Chronic bilateral low back pain without sciatica 09/14/2021   • Lumbar disc disease with radiculopathy 09/14/2021   • Lumbar spondylosis 09/14/2021   • Pain in both lower extremities 09/14/2021   • Persistent atrial fibrillation ([address]) 09/08/2021   • Long term current use of anticoagulant 08/10/2021   • Allergic rhinitis 08/10/2021   • Type 2 diabetes mellitus with other specified complication ([address]) 62/22/3127   • Mixed hyperlipidemia 08/10/2021   • Morbid obesity ([address]) 07/21/2021   • Obesity (BMI 30.0-34.9) 07/16/2021   • MISHA (obstructive sleep apnea) 07/16/2021   • Epistaxis 06/27/2021   • Hypertension    • Closed nondisplaced fracture of navicular bone of left foot 04/20/2017     She  has a past surgical history that includes Dilation and evacuation; Colonoscopy (2020); Mammo (historical); DXA procedure(historical); and Total abdominal hysterectomy w/ bilateral salpingoophorectomy (1994). Her family history includes Atrial fibrillation in her father; Diabetes in her father, mother, and paternal grandmother; Heart attack in her father. She  reports that she has never smoked. She has never used smokeless tobacco. She reports current alcohol use. She reports that she does not use drugs. Current Outpatient Medications   Medication Sig Dispense Refill   • betamethasone dipropionate (DIPROSONE) 0.05 % cream Pea sized external application twice daily for 3 weeks, then daily for 3 weeks, then every other day. 30 g 2   • Blood Glucose Monitoring Suppl (ONE TOUCH ULTRA 2) w/Device KIT use as directed by prescriber twice a day     • diltiazem (CARDIZEM CD) 240 mg 24 hr capsule TAKE 1 CAPSULE DAILY 90 capsule 3   • Jardiance 10 MG TABS Take 10 mg by mouth daily     • Lancets (OneTouch Delica Plus [id number]) MISC use as directed by prescriber twice a day     • lisinopril (ZESTRIL) 20 mg tablet Take 1 tablet (20 mg total) by mouth daily 90 tablet 3   • metoprolol succinate (TOPROL-XL) 50 mg 24 hr tablet TAKE 1 TABLET DAILY 90 tablet 3   • nystatin (MYCOSTATIN) cream Pea sized external application 1-2 times daily as needed.  30 g 3   • OneTouch Ultra test strip use as directed by prescriber twice a day     • rivaroxaban (Xarelto) 20 mg tablet Take 1 tablet (20 mg total) by mouth daily 90 tablet 3     No current facility-administered medications for this visit. She is allergic to codeine, other, and sulfa antibiotics. .    Review of Systems +pruritis, no bleeding    Objective:      /74   Wt 80.7 kg (178 lb)   BMI 30.55 kg/m²         Physical Exam   Appears well, no apparent distress. Does not appear anxious or depressed.   Pelvic exam: erythematous, indurated bilateral labia minora and majora with erythema extending perianal. Resolution of lichen  pale epithelium seen in past from periclitoral area to perianal. Normal urethra

## 2023-07-27 NOTE — ASSESSMENT & PLAN NOTE
Here in follow up to 6 weeks of steroid therapy (betamethasone) for exacerbated lichen sclerosis. Pt reports continued pruritis and erythema. On exam resolved pale epithelium of LS and prominence of indurated erythema c/w candida. Pt with diabetes. LS responded to topical steroid. Candida prominent. Will decrease steroid application to twice weekly and initiate nystatin 1-2 times daily. Discussed correlation between blood glucose levels and vulvar candidiasis.

## 2023-07-27 NOTE — PATIENT INSTRUCTIONS
Continue with betamethasone twice weekly to maintain the lichen sclerosis. Nystatin cream is to fight yeast and that can be used 1-2 times daily.

## 2023-08-02 ENCOUNTER — HOSPITAL ENCOUNTER (OUTPATIENT)
Dept: NON INVASIVE DIAGNOSTICS | Age: 83
Discharge: HOME | End: 2023-08-02
Payer: COMMERCIAL

## 2023-08-02 VITALS
BODY MASS INDEX: 30.39 KG/M2 | DIASTOLIC BLOOD PRESSURE: 66 MMHG | HEART RATE: 68 BPM | HEIGHT: 64 IN | SYSTOLIC BLOOD PRESSURE: 136 MMHG | WEIGHT: 178 LBS

## 2023-08-02 DIAGNOSIS — I48.19 PERSISTENT ATRIAL FIBRILLATION (HCC): ICD-10-CM

## 2023-08-02 LAB
AORTIC ROOT: 2.7 CM
APICAL FOUR CHAMBER EJECTION FRACTION: 58 %
ASCENDING AORTA: 3.7 CM
DOP CALC LVOT AREA: 3.14 CM2
DOP CALC LVOT DIAMETER: 2 CM
E WAVE DECELERATION TIME: 176 MS
FRACTIONAL SHORTENING: 43 (ref 28–44)
INTERVENTRICULAR SEPTUM IN DIASTOLE (PARASTERNAL SHORT AXIS VIEW): 0.9 CM
INTERVENTRICULAR SEPTUM: 0.9 CM (ref 0.6–1.1)
LAAS-AP2: 28.7 CM2
LAAS-AP4: 28.1 CM2
LEFT ATRIUM AREA SYSTOLE SINGLE PLANE A4C: 26.6 CM2
LEFT ATRIUM SIZE: 4.6 CM
LEFT ATRIUM VOLUME (MOD BIPLANE): 97 ML
LEFT INTERNAL DIMENSION IN SYSTOLE: 2 CM (ref 2.1–4)
LEFT VENTRICULAR INTERNAL DIMENSION IN DIASTOLE: 3.5 CM (ref 3.5–6)
LEFT VENTRICULAR POSTERIOR WALL IN END DIASTOLE: 1.4 CM
LEFT VENTRICULAR STROKE VOLUME: 38 ML
LVSV (TEICH): 38 ML
MV E'TISSUE VEL-SEP: 8 CM/S
MV PEAK A VEL: 0.01 M/S
MV PEAK E VEL: 121 CM/S
MV STENOSIS PRESSURE HALF TIME: 51 MS
MV VALVE AREA P 1/2 METHOD: 4.31
PA SYSTOLIC PRESSURE: 40 MMHG
RIGHT ATRIUM AREA SYSTOLE A4C: 22.4 CM2
RIGHT VENTRICLE ID DIMENSION: 3.6 CM
SL CV LEFT ATRIUM LENGTH A2C: 6.8 CM
SL CV LV EF: 65
SL CV PED ECHO LEFT VENTRICLE DIASTOLIC VOLUME (MOD BIPLANE) 2D: 50 ML
SL CV PED ECHO LEFT VENTRICLE SYSTOLIC VOLUME (MOD BIPLANE) 2D: 12 ML
TR MAX PG: 29 MMHG
TR PEAK VELOCITY: 2.7 M/S
TRICUSPID ANNULAR PLANE SYSTOLIC EXCURSION: 1.6 CM
TRICUSPID VALVE PEAK REGURGITATION VELOCITY: 2.7 M/S

## 2023-08-02 PROCEDURE — 93306 TTE W/DOPPLER COMPLETE: CPT | Performed by: INTERNAL MEDICINE

## 2023-08-02 PROCEDURE — 93306 TTE W/DOPPLER COMPLETE: CPT

## 2023-11-01 NOTE — PROGRESS NOTES
After Visit Summary  (Discharge Instructions)    Medication List for Home    Based on the information you provided to us as well as any changes during this visit, the following is your updated medication list.  Compare this with your prescription bottles at home. If you have any questions or concerns, contact your primary care physician's office. Daily Medication List (This medication list can be shared with any healthcare provider who is helping you manage your medications)      ASK your doctor about these medications if you have questions        Last Dose    Next Dose Due AM NOON PM NIGHT    ALPRAZolam 0.5 MG tablet   Commonly known as:  XANAX   Take 1 tablet by mouth every 6 hours as needed for Anxiety for up to 15 doses. HYDROcodone-acetaminophen 5-325 MG per tablet   Commonly known as:  NORCO   Take 1 tablet by mouth every 4 hours as needed for Pain . 2 tablets on 10/5/2017  5:49 AM                            METAMUCIL 0.52 g capsule   Generic drug:  psyllium   Take 0.52 g by mouth daily. omeprazole 20 MG delayed release capsule   Commonly known as:  PRILOSEC   Take 1 capsule by mouth daily.                                          pantoprazole 40 MG tablet   Commonly known as:  PROTONIX   Take 1 tablet by mouth daily                                         polycarbophil 625 MG tablet   Commonly known as:  FIBERCON   Take 625 mg by mouth daily Patient takes fiber pill daily                                         sennosides-docusate sodium 8.6-50 MG tablet   Commonly known as:  SENOKOT-S   Take 1 tablet by mouth daily                1 tablet on 10/4/2017  9:06 PM                            TOPROL XL 25 MG extended release tablet   Generic drug:  metoprolol succinate   Take 25 mg by mouth daily                25 mg on 10/4/2017  9:01 PM                            Vitamin C 500 MG Caps Assessment/Plan:    Vulvar candidiasis  On Jardiance, aware of increased vulvar irritation with mechanism of glucose clearance. Has been alternating nystatin with betamethasone with effect, sometimes irritated. Exam with much improvement. Will continue with current regimen, will contact when refills needed. Recommend ointment barrier with Aquafor or Vit A&D daily. RTO one year. Discussed guidelines to stop pap screening at 65 in low risk individuals. Discussed mammogram screening and option to stop. Reviewed breast cancer risk and options for surveillance with SBE and clinical breast exam.        Diagnoses and all orders for this visit:    Lichen sclerosus    Vulvar candidiasis    Other orders  -     Clobetasol Prop Emollient Base 0.05 % emollient cream; 1 Application Every 12 hours          Subjective:      Patient ID: Theodora Wallace is a 79 y.o. female. HPI Here in follow up to candida dermatitis after improvement in LS    The following portions of the patient's history were reviewed and updated as appropriate: She  has a past medical history of A-fib ([address]), BMI 30.0-30.9,adult, Chronic pain, Coronary artery disease (2021), Epistaxis, Fibroid, Hypertension, Lumbar spondylosis, Sleep apnea, and Type 2 DM.   She   Patient Active Problem List    Diagnosis Date Noted    Vulvar candidiasis 56/81/3027    Lichen sclerosus 44/90/8070    Chronic pain syndrome 09/14/2021    Chronic bilateral low back pain without sciatica 09/14/2021    Lumbar disc disease with radiculopathy 09/14/2021    Lumbar spondylosis 09/14/2021    Pain in both lower extremities 09/14/2021    Persistent atrial fibrillation ([address]) 09/08/2021    Long term current use of anticoagulant 08/10/2021    Allergic rhinitis 08/10/2021    Type 2 diabetes mellitus with other specified complication ([address]) 97/69/3267    Mixed hyperlipidemia 08/10/2021    Morbid obesity ([address]) 07/21/2021    Obesity (BMI 30.0-34.9) 07/16/2021    MISHA (obstructive sleep Take 1 tablet by mouth. 500mg-2000mg depending on if she has time to take them                                         VITAMIN D PO   Take  by mouth. Allergies as of 10/5/2017        Reactions    Cymbalta [Duloxetine Hcl]     Sulfa Antibiotics       Immunizations as of 10/5/2017     No immunizations on file. Last Vitals          Most Recent Value    Temp  98 °F (36.7 °C)    Pulse  99    Resp  16    BP  128/72         After Visit Summary    This summary was created for you. Thank you for entrusting your care to us. The following information includes details about your hospital/visit stay along with steps you should take to help with your recovery once you leave the hospital.  In this packet, you will find information about the topics listed below:    · Instructions about your medications including a list of your home medications  · A summary of your hospital visit  · Follow-up appointments once you have left the hospital  · Your care plan at home      You may receive a survey regarding the care you received during your stay. Your input is valuable to us. We encourage you to complete and return your survey in the envelope provided. We hope you will choose us in the future for your healthcare needs. Patient Information     Patient Name Casandra Pelayo 1959      Care Provided at:     Name Address Phone       [address]  [place] [phone]            Your Visit    Here you will find information about your visit, including the reason for your visit. Please take this sheet with you when you visit your doctor or other health care provider in the future. It will help determine the best possible medical care for you at that time. If you have any questions once you leave the hospital, please call the department phone number listed below.         Why you were here     Your primary diagnosis was:  Renal Colic apnea) 07/16/2021    Epistaxis 06/27/2021    Hypertension     Closed nondisplaced fracture of navicular bone of left foot 04/20/2017     She  has a past surgical history that includes Dilation and evacuation; Colonoscopy (2020); Mammo (historical); DXA procedure(historical); and Total abdominal hysterectomy w/ bilateral salpingoophorectomy (1994). Her family history includes Atrial fibrillation in her father; Diabetes in her father, mother, and paternal grandmother; Heart attack in her father. She  reports that she has never smoked. She has never used smokeless tobacco. She reports current alcohol use. She reports that she does not use drugs. Current Outpatient Medications   Medication Sig Dispense Refill    betamethasone dipropionate (DIPROSONE) 0.05 % cream Pea sized external application twice daily for 3 weeks, then daily for 3 weeks, then every other day. 30 g 2    Blood Glucose Monitoring Suppl (ONE TOUCH ULTRA 2) w/Device KIT use as directed by prescriber twice a day      Clobetasol Prop Emollient Base 0.05 % emollient cream 1 Application Every 12 hours      diltiazem (CARDIZEM CD) 240 mg 24 hr capsule TAKE 1 CAPSULE DAILY 90 capsule 3    Jardiance 10 MG TABS Take 10 mg by mouth daily      Lancets (OneTouch Delica Plus [id number]) MISC use as directed by prescriber twice a day      lisinopril (ZESTRIL) 20 mg tablet Take 1 tablet (20 mg total) by mouth daily 90 tablet 3    metoprolol succinate (TOPROL-XL) 50 mg 24 hr tablet TAKE 1 TABLET DAILY 90 tablet 3    nystatin (MYCOSTATIN) cream Pea sized external application 1-2 times daily as needed. 30 g 3    OneTouch Ultra test strip use as directed by prescriber twice a day      rivaroxaban (Xarelto) 20 mg tablet Take 1 tablet (20 mg total) by mouth daily 90 tablet 3     No current facility-administered medications for this visit. She is allergic to codeine, other, and sulfa antibiotics. .    Review of Systems  some irritation.  No PMB    Objective:      BP 128/76 (BP Location: Left arm, Patient Position: Sitting, Cuff Size: Large)   Ht 5' 5" (1.651 m)   Wt 83.6 kg (184 lb 3.2 oz)   BMI 30.65 kg/m²          Physical Exam    Appears well, no apparent distress. Does not appear anxious or depressed.   Pelvic exam: atrophic external genitalia with minimal erythema and induration, very minimal LS, vagina normal no abnormal discharge, no erythema Your diagnoses also included:  Ureteral Calculus, Intractable Cyclical Vomiting With Nausea      Visit Information     Date & Time Provider Department Dept. Phone    10/4/2017 Heaven Diamond MD [facility] AT THE [facility] MED SURG [phone]       Follow-up Appointments    Below is a list of your follow-up and future appointments. This may not be a complete list as you may have made appointments directly with providers that we are not aware of or your providers may have made some for you. Please call your providers to confirm appointments. It is important to keep your appointments. Please bring your current insurance card, photo ID, co-pay, and all medication bottles to your appointment. If self-pay, payment is expected at the time of service. Follow-up Information     Follow up with Steve Long. Specialty:  Family Medicine    Contact information:    [address]  [phone]          Schedule an appointment as soon as possible for a visit with Ettie Scheuermann, MD.    Specialty:  Urology    Contact information:    [address] [place] [address]  [phone]        Preventive Care        Date Due    Hepatitis C screening is recommended for all adults regardless of risk factors born between [facility] at least once (lifetime) who have never been tested. 1959    HIV screening is recommended for all people regardless of risk factors  aged 15-65 years at least once (lifetime) who have never been HIV tested. 8/6/1973    Tetanus Combination Vaccine (1 - Tdap) 8/6/1977    Pap Smear 8/6/1979    Diabetes Screening 8/6/1998    Mammograms are recommended every 2 years for low/average risk patients aged 48 - 69, and every year for high risk patients per updated national guidelines. However these guidelines can be individualized by your provider.  8/6/2008    Colonoscopy 8/6/2008    Yearly Flu Vaccine (1) 9/1/2017    Cholesterol Screening 4/13/2020 Care Plan Once You Return Home    This section includes instructions you will need to follow once you leave the hospital.  Your care team will discuss these with you, so you and those caring for you know how to best care for your health needs at home. This section may also include educational information about certain health topics that may be of help to you. MyChart Signup     Happy Studio allows you to send messages to your doctor, view your test results, renew your prescriptions, schedule appointments, view visit notes, and more. How Do I Sign Up? 1. In your Internet browser, go to [email or web address]  2. Click on the Sign Up Now link in the Sign In box. You will see the New Member Sign Up page. 3. Enter your Happy Studio Access Code exactly as it appears below. You will not need to use this code after youve completed the sign-up process. If you do not sign up before the expiration date, you must request a new code. [employer] Access Code: [email or web address]  Expires: 10/23/2017  4:56 AM    4. Enter your Social Security Number ([id number]) and Date of Birth (mm/dd/yyyy) as indicated and click Submit. You will be taken to the next sign-up page. 5. Create a Happy Studio ID. This will be your Happy Studio login ID and cannot be changed, so think of one that is secure and easy to remember. 6. Create a Happy Studio password. You can change your password at any time. 7. Enter your Password Reset Question and Answer. This can be used at a later time if you forget your password. 8. Enter your e-mail address. You will receive e-mail notification when new information is available in [address] 9. Click Sign Up. You can now view your medical record. Additional Information  If you have questions, please contact the physician practice where you receive care. Remember, [employer] is NOT to be used for urgent needs. For medical emergencies, dial 911. Watch closely for changes in your health, and be sure to contact your doctor if you have any problems. Where can you learn more? Go to [email or web address] and sign in to your Granular account. Enter U751 in the Edventory box to learn more about \"Ureteral Stent Placement: What to Expect at Home. \"     If you do not have an account, please click on the \"Sign Up Now\" link. Current as of: August 12, 2016  Content Version: 11.3  © 6466-0256 [employer]. Care instructions adapted under license by [facility] ([facility]). If you have questions about a medical condition or this instruction, always ask your healthcare professional. [address] any warranty or liability for your use of this information. Kidney Stone: Care Instructions  Your Care Instructions    Kidney stones are formed when salts, minerals, and other substances normally found in the urine clump together. They can be as small as grains of sand or, rarely, as large as golf balls. While the stone is traveling through the ureter, which is the tube that carries urine from the kidney to the bladder, you will probably feel pain. The pain may be mild or very severe. You may also have some blood in your urine. As soon as the stone reaches the bladder, any intense pain should go away. If a stone is too large to pass on its own, you may need a medical procedure to help you pass the stone. The doctor has checked you carefully, but problems can develop later. If you notice any problems or new symptoms, get medical treatment right away. Follow-up care is a key part of your treatment and safety. Be sure to make and go to all appointments, and call your doctor if you are having problems. It's also a good idea to know your test results and keep a list of the medicines you take. How can you care for yourself at home?   · Drink plenty of fluids, enough so that your urine is light yellow or clear like water. If you have kidney, heart, or liver disease and have to limit fluids, talk with your doctor before you increase the amount of fluids you drink. · Take pain medicines exactly as directed. Call your doctor if you think you are having a problem with your medicine. ¨ If the doctor gave you a prescription medicine for pain, take it as prescribed. ¨ If you are not taking a prescription pain medicine, ask your doctor if you can take an over-the-counter medicine. Read and follow all instructions on the label. · Your doctor may ask you to strain your urine so that you can collect your kidney stone when it passes. You can use a kitchen strainer or a tea strainer to catch the stone. Store it in a plastic bag until you see your doctor again. Preventing future kidney stones  Some changes in your diet may help prevent kidney stones. Depending on the cause of your stones, your doctor may recommend that you:  · Drink plenty of fluids, enough so that your urine is light yellow or clear like water. If you have kidney, heart, or liver disease and have to limit fluids, talk with your doctor before you increase the amount of fluids you drink. · Limit coffee, tea, and alcohol. Also avoid grapefruit juice. · Do not take more than the recommended daily dose of vitamins C and D.  · Avoid antacids such as Gaviscon, Maalox, Mylanta, or Tums. · Limit the amount of salt (sodium) in your diet. · Eat a balanced diet that is not too high in protein. · Limit foods that are high in a substance called oxalate, which can cause kidney stones. These foods include dark green vegetables, rhubarb, chocolate, wheat bran, nuts, cranberries, and beans. When should you call for help? Call your doctor now or seek immediate medical care if:  · You cannot keep down fluids. · Your pain gets worse. · You have a fever or chills. · You have new or worse pain in your back just below your rib cage (the flank area). · You have new or more blood in your urine. Watch closely for changes in your health, and be sure to contact your doctor if:  · You do not get better as expected. Where can you learn more? Go to [email or web address] and sign in to your Walk Score account. Enter H309 in the BO.LT box to learn more about \"Kidney Stone: Care Instructions. \"     If you do not have an account, please click on the \"Sign Up Now\" link. Current as of: April 3, 2017  Content Version: 11.3  © 9784-9623 [employer]. Care instructions adapted under license by [facility] ([facility]). If you have questions about a medical condition or this instruction, always ask your healthcare professional. [address] any warranty or liability for your use of this information.

## 2023-11-02 ENCOUNTER — OFFICE VISIT (OUTPATIENT)
Dept: OBGYN CLINIC | Facility: CLINIC | Age: 83
End: 2023-11-02
Payer: COMMERCIAL

## 2023-11-02 VITALS
SYSTOLIC BLOOD PRESSURE: 128 MMHG | HEIGHT: 65 IN | WEIGHT: 184.2 LBS | DIASTOLIC BLOOD PRESSURE: 76 MMHG | BODY MASS INDEX: 30.69 KG/M2

## 2023-11-02 DIAGNOSIS — B37.31 VULVAR CANDIDIASIS: ICD-10-CM

## 2023-11-02 DIAGNOSIS — L90.0 LICHEN SCLEROSUS: Primary | ICD-10-CM

## 2023-11-02 PROCEDURE — 99213 OFFICE O/P EST LOW 20 MIN: CPT | Performed by: OBSTETRICS & GYNECOLOGY

## 2023-11-02 RX ORDER — CLOBETASOL PROPIONATE 0.5 MG/G
1 CREAM TOPICAL EVERY 12 HOURS
COMMUNITY

## 2023-11-02 NOTE — LETTER
November 2, 2023     Mandi Mcdonald MD  [address] [address]    Patient: Alondra Bender   YOB: 1940   Date of Visit: 11/2/2023       Dear Dr. Aguilar Confer: Mayo Crowe was in to see me today. Below are my notes for this consultation. If you have questions, please do not hesitate to call me. I look forward to following your patient along with you. Sincerely,        Rip Cervantes MD        CC: No Recipients    Rip Cervantes MD  11/2/2023  1:49 PM  Sign when Signing Visit  Assessment/Plan:    Vulvar candidiasis  On Jardiance, aware of increased vulvar irritation with mechanism of glucose clearance. Has been alternating nystatin with betamethasone with effect, sometimes irritated. Exam with much improvement. Will continue with current regimen, will contact when refills needed. Recommend ointment barrier with Aquafor or Vit A&D daily. RTO one year. Discussed guidelines to stop pap screening at 65 in low risk individuals. Discussed mammogram screening and option to stop. Reviewed breast cancer risk and options for surveillance with SBE and clinical breast exam.        Diagnoses and all orders for this visit:    Lichen sclerosus    Vulvar candidiasis    Other orders  -     Clobetasol Prop Emollient Base 0.05 % emollient cream; 1 Application Every 12 hours          Subjective:      Patient ID: Alondra Bender is a 79 y.o. female. HPI Here in follow up to candida dermatitis after improvement in LS    The following portions of the patient's history were reviewed and updated as appropriate: She  has a past medical history of A-fib ([address] [facility]), BMI 30.0-30.9,adult, Chronic pain, Coronary artery disease (2021), Epistaxis, Fibroid, Hypertension, Lumbar spondylosis, Sleep apnea, and Type 2 DM.   She   Patient Active Problem List    Diagnosis Date Noted   • Vulvar candidiasis 08/77/5289   • Lichen sclerosus 99/36/3005   • Chronic pain syndrome 09/14/2021   • Chronic bilateral low back pain without sciatica 09/14/2021   • Lumbar disc disease with radiculopathy 09/14/2021   • Lumbar spondylosis 09/14/2021   • Pain in both lower extremities 09/14/2021   • Persistent atrial fibrillation ([address]) 09/08/2021   • Long term current use of anticoagulant 08/10/2021   • Allergic rhinitis 08/10/2021   • Type 2 diabetes mellitus with other specified complication ([address]) 34/61/3248   • Mixed hyperlipidemia 08/10/2021   • Morbid obesity ([address]) 07/21/2021   • Obesity (BMI 30.0-34.9) 07/16/2021   • MISHA (obstructive sleep apnea) 07/16/2021   • Epistaxis 06/27/2021   • Hypertension    • Closed nondisplaced fracture of navicular bone of left foot 04/20/2017     She  has a past surgical history that includes Dilation and evacuation; Colonoscopy (2020); Mammo (historical); DXA procedure(historical); and Total abdominal hysterectomy w/ bilateral salpingoophorectomy (1994). Her family history includes Atrial fibrillation in her father; Diabetes in her father, mother, and paternal grandmother; Heart attack in her father. She  reports that she has never smoked. She has never used smokeless tobacco. She reports current alcohol use. She reports that she does not use drugs. Current Outpatient Medications   Medication Sig Dispense Refill   • betamethasone dipropionate (DIPROSONE) 0.05 % cream Pea sized external application twice daily for 3 weeks, then daily for 3 weeks, then every other day.  30 g 2   • Blood Glucose Monitoring Suppl (ONE TOUCH ULTRA 2) w/Device KIT use as directed by prescriber twice a day     • Clobetasol Prop Emollient Base 0.05 % emollient cream 1 Application Every 12 hours     • diltiazem (CARDIZEM CD) 240 mg 24 hr capsule TAKE 1 CAPSULE DAILY 90 capsule 3   • Jardiance 10 MG TABS Take 10 mg by mouth daily     • Lancets (OneTouch Delica Plus [id number]) MISC use as directed by prescriber twice a day     • lisinopril (ZESTRIL) 20 mg tablet Take 1 tablet (20 mg total) by mouth daily 90 tablet 3   • metoprolol succinate (TOPROL-XL) 50 mg 24 hr tablet TAKE 1 TABLET DAILY 90 tablet 3   • nystatin (MYCOSTATIN) cream Pea sized external application 1-2 times daily as needed. 30 g 3   • OneTouch Ultra test strip use as directed by prescriber twice a day     • rivaroxaban (Xarelto) 20 mg tablet Take 1 tablet (20 mg total) by mouth daily 90 tablet 3     No current facility-administered medications for this visit. She is allergic to codeine, other, and sulfa antibiotics. .    Review of Systems  some irritation. No PMB    Objective:      /76 (BP Location: Left arm, Patient Position: Sitting, Cuff Size: Large)   Ht 5' 5" (1.651 m)   Wt 83.6 kg (184 lb 3.2 oz)   BMI 30.65 kg/m²          Physical Exam    Appears well, no apparent distress. Does not appear anxious or depressed.   Pelvic exam: atrophic external genitalia with minimal erythema and induration, very minimal LS, vagina normal no abnormal discharge, no erythema

## 2023-11-02 NOTE — ASSESSMENT & PLAN NOTE
On Jardiance, aware of increased vulvar irritation with mechanism of glucose clearance. Has been alternating nystatin with betamethasone with effect, sometimes irritated. Exam with much improvement. Will continue with current regimen, will contact when refills needed. Recommend ointment barrier with Aquafor or Vit A&D daily. RTO one year. Discussed guidelines to stop pap screening at 65 in low risk individuals. Discussed mammogram screening and option to stop.  Reviewed breast cancer risk and options for surveillance with SBE and clinical breast exam.

## 2023-11-02 NOTE — PATIENT INSTRUCTIONS
Try Aquafor ointment (clear) or Vitamin A&D ointment in the morning as a barrier. Continue with alternating the betamethasone and nystatin creams.

## 2023-11-18 DIAGNOSIS — I48.19 PERSISTENT ATRIAL FIBRILLATION (HCC): ICD-10-CM

## 2023-11-22 ENCOUNTER — APPOINTMENT (OUTPATIENT)
Dept: LAB | Facility: CLINIC | Age: 83
DRG: 305 | End: 2023-11-22
Payer: COMMERCIAL

## 2023-11-22 DIAGNOSIS — I48.91 ATRIAL FIBRILLATION, UNSPECIFIED TYPE (HCC): ICD-10-CM

## 2023-11-22 DIAGNOSIS — I10 ESSENTIAL (PRIMARY) HYPERTENSION: ICD-10-CM

## 2023-11-22 DIAGNOSIS — E78.2 MIXED HYPERLIPIDEMIA: ICD-10-CM

## 2023-11-22 DIAGNOSIS — E11.69 CONTROLLED TYPE 2 DIABETES MELLITUS WITH OTHER SPECIFIED COMPLICATION, WITHOUT LONG-TERM CURRENT USE OF INSULIN (HCC): ICD-10-CM

## 2023-11-22 LAB
ALBUMIN SERPL BCP-MCNC: 4.1 G/DL (ref 3.5–5)
ALP SERPL-CCNC: 43 U/L (ref 34–104)
ALT SERPL W P-5'-P-CCNC: 12 U/L (ref 7–52)
ANION GAP SERPL CALCULATED.3IONS-SCNC: 10 MMOL/L
AST SERPL W P-5'-P-CCNC: 12 U/L (ref 13–39)
BILIRUB SERPL-MCNC: 0.69 MG/DL (ref 0.2–1)
BUN SERPL-MCNC: 14 MG/DL (ref 5–25)
CALCIUM SERPL-MCNC: 9.5 MG/DL (ref 8.4–10.2)
CHLORIDE SERPL-SCNC: 104 MMOL/L (ref 96–108)
CHOLEST SERPL-MCNC: 199 MG/DL
CO2 SERPL-SCNC: 29 MMOL/L (ref 21–32)
CREAT SERPL-MCNC: 0.73 MG/DL (ref 0.6–1.3)
EST. AVERAGE GLUCOSE BLD GHB EST-MCNC: 154 MG/DL
GFR SERPL CREATININE-BSD FRML MDRD: 75 ML/MIN/1.73SQ M
GLUCOSE P FAST SERPL-MCNC: 131 MG/DL (ref 65–99)
HBA1C MFR BLD: 7 %
HDLC SERPL-MCNC: 42 MG/DL
LDLC SERPL CALC-MCNC: 135 MG/DL (ref 0–100)
NONHDLC SERPL-MCNC: 157 MG/DL
POTASSIUM SERPL-SCNC: 4.1 MMOL/L (ref 3.5–5.3)
PROT SERPL-MCNC: 6.6 G/DL (ref 6.4–8.4)
SODIUM SERPL-SCNC: 143 MMOL/L (ref 135–147)
TRIGL SERPL-MCNC: 108 MG/DL

## 2023-11-22 PROCEDURE — 83036 HEMOGLOBIN GLYCOSYLATED A1C: CPT

## 2023-11-22 PROCEDURE — 36415 COLL VENOUS BLD VENIPUNCTURE: CPT

## 2023-11-22 PROCEDURE — 80053 COMPREHEN METABOLIC PANEL: CPT

## 2023-11-22 PROCEDURE — 80061 LIPID PANEL: CPT

## 2023-11-25 ENCOUNTER — HOSPITAL ENCOUNTER (INPATIENT)
Facility: HOSPITAL | Age: 83
LOS: 2 days | Discharge: HOME | DRG: 305 | End: 2023-11-27
Attending: EMERGENCY MEDICINE | Admitting: HOSPITALIST
Payer: COMMERCIAL

## 2023-11-25 ENCOUNTER — APPOINTMENT (EMERGENCY)
Dept: CT IMAGING | Facility: HOSPITAL | Age: 83
DRG: 305 | End: 2023-11-25
Payer: COMMERCIAL

## 2023-11-25 ENCOUNTER — APPOINTMENT (EMERGENCY)
Dept: RADIOLOGY | Facility: HOSPITAL | Age: 83
DRG: 305 | End: 2023-11-25
Payer: COMMERCIAL

## 2023-11-25 DIAGNOSIS — R42 DIZZINESS: Primary | ICD-10-CM

## 2023-11-25 DIAGNOSIS — I67.2 CEREBRAL ATHEROSCLEROSIS: ICD-10-CM

## 2023-11-25 LAB
ANION GAP SERPL CALCULATED.3IONS-SCNC: 11 MMOL/L
APTT PPP: 28 SECONDS (ref 23–37)
BUN SERPL-MCNC: 20 MG/DL (ref 5–25)
CALCIUM SERPL-MCNC: 9.8 MG/DL (ref 8.4–10.2)
CARDIAC TROPONIN I PNL SERPL HS: 3 NG/L
CHLORIDE SERPL-SCNC: 104 MMOL/L (ref 96–108)
CO2 SERPL-SCNC: 24 MMOL/L (ref 21–32)
CREAT SERPL-MCNC: 0.72 MG/DL (ref 0.6–1.3)
ERYTHROCYTE [DISTWIDTH] IN BLOOD BY AUTOMATED COUNT: 13.1 % (ref 11.6–15.1)
FLUAV RNA RESP QL NAA+PROBE: NEGATIVE
FLUBV RNA RESP QL NAA+PROBE: NEGATIVE
GFR SERPL CREATININE-BSD FRML MDRD: 77 ML/MIN/1.73SQ M
GLUCOSE SERPL-MCNC: 171 MG/DL (ref 65–140)
GLUCOSE SERPL-MCNC: 172 MG/DL (ref 65–140)
HCT VFR BLD AUTO: 49.7 % (ref 34.8–46.1)
HGB BLD-MCNC: 16.4 G/DL (ref 11.5–15.4)
INR PPP: 1.12 (ref 0.84–1.19)
LIPASE SERPL-CCNC: 26 U/L (ref 11–82)
MCH RBC QN AUTO: 29.4 PG (ref 26.8–34.3)
MCHC RBC AUTO-ENTMCNC: 33 G/DL (ref 31.4–37.4)
MCV RBC AUTO: 89 FL (ref 82–98)
PLATELET # BLD AUTO: 226 THOUSANDS/UL (ref 149–390)
PMV BLD AUTO: 9.5 FL (ref 8.9–12.7)
POTASSIUM SERPL-SCNC: 3.7 MMOL/L (ref 3.5–5.3)
PROTHROMBIN TIME: 14.8 SECONDS (ref 11.6–14.5)
RBC # BLD AUTO: 5.58 MILLION/UL (ref 3.81–5.12)
RSV RNA RESP QL NAA+PROBE: NEGATIVE
SARS-COV-2 RNA RESP QL NAA+PROBE: NEGATIVE
SODIUM SERPL-SCNC: 139 MMOL/L (ref 135–147)
WBC # BLD AUTO: 7.5 THOUSAND/UL (ref 4.31–10.16)

## 2023-11-25 PROCEDURE — 85730 THROMBOPLASTIN TIME PARTIAL: CPT | Performed by: EMERGENCY MEDICINE

## 2023-11-25 PROCEDURE — 93005 ELECTROCARDIOGRAM TRACING: CPT

## 2023-11-25 PROCEDURE — NC001 PR NO CHARGE: Performed by: STUDENT IN AN ORGANIZED HEALTH CARE EDUCATION/TRAINING PROGRAM

## 2023-11-25 PROCEDURE — 85610 PROTHROMBIN TIME: CPT | Performed by: EMERGENCY MEDICINE

## 2023-11-25 PROCEDURE — 80061 LIPID PANEL: CPT | Performed by: PHYSICIAN ASSISTANT

## 2023-11-25 PROCEDURE — 36415 COLL VENOUS BLD VENIPUNCTURE: CPT | Performed by: EMERGENCY MEDICINE

## 2023-11-25 PROCEDURE — 85027 COMPLETE CBC AUTOMATED: CPT | Performed by: EMERGENCY MEDICINE

## 2023-11-25 PROCEDURE — 84484 ASSAY OF TROPONIN QUANT: CPT | Performed by: EMERGENCY MEDICINE

## 2023-11-25 PROCEDURE — 82948 REAGENT STRIP/BLOOD GLUCOSE: CPT

## 2023-11-25 PROCEDURE — 83690 ASSAY OF LIPASE: CPT | Performed by: EMERGENCY MEDICINE

## 2023-11-25 PROCEDURE — 71045 X-RAY EXAM CHEST 1 VIEW: CPT

## 2023-11-25 PROCEDURE — 80048 BASIC METABOLIC PNL TOTAL CA: CPT | Performed by: EMERGENCY MEDICINE

## 2023-11-25 PROCEDURE — 99223 1ST HOSP IP/OBS HIGH 75: CPT | Performed by: PHYSICIAN ASSISTANT

## 2023-11-25 PROCEDURE — 99285 EMERGENCY DEPT VISIT HI MDM: CPT

## 2023-11-25 PROCEDURE — 96374 THER/PROPH/DIAG INJ IV PUSH: CPT

## 2023-11-25 PROCEDURE — 0241U HB NFCT DS VIR RESP RNA 4 TRGT: CPT | Performed by: EMERGENCY MEDICINE

## 2023-11-25 PROCEDURE — 70498 CT ANGIOGRAPHY NECK: CPT

## 2023-11-25 PROCEDURE — 70496 CT ANGIOGRAPHY HEAD: CPT

## 2023-11-25 RX ORDER — INSULIN LISPRO 100 [IU]/ML
1-5 INJECTION, SOLUTION INTRAVENOUS; SUBCUTANEOUS
Status: DISCONTINUED | OUTPATIENT
Start: 2023-11-26 | End: 2023-11-27 | Stop reason: HOSPADM

## 2023-11-25 RX ORDER — HEPARIN SODIUM 5000 [USP'U]/ML
5000 INJECTION, SOLUTION INTRAVENOUS; SUBCUTANEOUS EVERY 8 HOURS SCHEDULED
Status: DISCONTINUED | OUTPATIENT
Start: 2023-11-26 | End: 2023-11-27 | Stop reason: HOSPADM

## 2023-11-25 RX ORDER — ACETAMINOPHEN 325 MG/1
650 TABLET ORAL EVERY 6 HOURS PRN
Status: DISCONTINUED | OUTPATIENT
Start: 2023-11-25 | End: 2023-11-27 | Stop reason: HOSPADM

## 2023-11-25 RX ORDER — ASPIRIN 325 MG
325 TABLET ORAL ONCE
Status: COMPLETED | OUTPATIENT
Start: 2023-11-25 | End: 2023-11-25

## 2023-11-25 RX ORDER — ONDANSETRON 2 MG/ML
4 INJECTION INTRAMUSCULAR; INTRAVENOUS ONCE
Status: COMPLETED | OUTPATIENT
Start: 2023-11-25 | End: 2023-11-25

## 2023-11-25 RX ORDER — ASPIRIN 81 MG/1
81 TABLET, CHEWABLE ORAL DAILY
Status: DISCONTINUED | OUTPATIENT
Start: 2023-11-26 | End: 2023-11-27 | Stop reason: HOSPADM

## 2023-11-25 RX ORDER — LABETALOL HYDROCHLORIDE 5 MG/ML
10 INJECTION, SOLUTION INTRAVENOUS EVERY 6 HOURS PRN
Status: DISCONTINUED | OUTPATIENT
Start: 2023-11-25 | End: 2023-11-27 | Stop reason: HOSPADM

## 2023-11-25 RX ADMIN — ASPIRIN 325 MG ORAL TABLET 325 MG: 325 PILL ORAL at 22:32

## 2023-11-25 RX ADMIN — IOHEXOL 85 ML: 350 INJECTION, SOLUTION INTRAVENOUS at 21:41

## 2023-11-25 RX ADMIN — ONDANSETRON 4 MG: 2 INJECTION INTRAMUSCULAR; INTRAVENOUS at 22:10

## 2023-11-25 NOTE — Clinical Note
Case was discussed with hospitalist and the patient's admission status was agreed to be Admission Status: inpatient status to the service of Dr. Izaiah Alvarez .

## 2023-11-26 ENCOUNTER — APPOINTMENT (INPATIENT)
Dept: MRI IMAGING | Facility: HOSPITAL | Age: 83
DRG: 305 | End: 2023-11-26
Payer: COMMERCIAL

## 2023-11-26 LAB
ANION GAP SERPL CALCULATED.3IONS-SCNC: 8 MMOL/L
ATRIAL RATE: 208 BPM
BUN SERPL-MCNC: 15 MG/DL (ref 5–25)
CALCIUM SERPL-MCNC: 9.2 MG/DL (ref 8.4–10.2)
CHLORIDE SERPL-SCNC: 105 MMOL/L (ref 96–108)
CHOLEST SERPL-MCNC: 224 MG/DL
CO2 SERPL-SCNC: 26 MMOL/L (ref 21–32)
CREAT SERPL-MCNC: 0.61 MG/DL (ref 0.6–1.3)
ERYTHROCYTE [DISTWIDTH] IN BLOOD BY AUTOMATED COUNT: 13.1 % (ref 11.6–15.1)
GFR SERPL CREATININE-BSD FRML MDRD: 83 ML/MIN/1.73SQ M
GLUCOSE SERPL-MCNC: 124 MG/DL (ref 65–140)
GLUCOSE SERPL-MCNC: 163 MG/DL (ref 65–140)
GLUCOSE SERPL-MCNC: 193 MG/DL (ref 65–140)
GLUCOSE SERPL-MCNC: 196 MG/DL (ref 65–140)
GLUCOSE SERPL-MCNC: 89 MG/DL (ref 65–140)
HCT VFR BLD AUTO: 46.4 % (ref 34.8–46.1)
HDLC SERPL-MCNC: 49 MG/DL
HGB BLD-MCNC: 15 G/DL (ref 11.5–15.4)
LDLC SERPL CALC-MCNC: 149 MG/DL (ref 0–100)
MCH RBC QN AUTO: 29.2 PG (ref 26.8–34.3)
MCHC RBC AUTO-ENTMCNC: 32.3 G/DL (ref 31.4–37.4)
MCV RBC AUTO: 90 FL (ref 82–98)
PLATELET # BLD AUTO: 207 THOUSANDS/UL (ref 149–390)
PMV BLD AUTO: 10 FL (ref 8.9–12.7)
POTASSIUM SERPL-SCNC: 3.8 MMOL/L (ref 3.5–5.3)
QRS AXIS: 3 DEGREES
QRSD INTERVAL: 88 MS
QT INTERVAL: 418 MS
QTC INTERVAL: 470 MS
RBC # BLD AUTO: 5.13 MILLION/UL (ref 3.81–5.12)
SODIUM SERPL-SCNC: 139 MMOL/L (ref 135–147)
T WAVE AXIS: 43 DEGREES
TRIGL SERPL-MCNC: 129 MG/DL
VENTRICULAR RATE: 76 BPM
WBC # BLD AUTO: 7.4 THOUSAND/UL (ref 4.31–10.16)

## 2023-11-26 PROCEDURE — 70551 MRI BRAIN STEM W/O DYE: CPT

## 2023-11-26 PROCEDURE — 80048 BASIC METABOLIC PNL TOTAL CA: CPT | Performed by: PHYSICIAN ASSISTANT

## 2023-11-26 PROCEDURE — 94760 N-INVAS EAR/PLS OXIMETRY 1: CPT

## 2023-11-26 PROCEDURE — 85027 COMPLETE CBC AUTOMATED: CPT | Performed by: PHYSICIAN ASSISTANT

## 2023-11-26 PROCEDURE — 36415 COLL VENOUS BLD VENIPUNCTURE: CPT | Performed by: PHYSICIAN ASSISTANT

## 2023-11-26 PROCEDURE — 93010 ELECTROCARDIOGRAM REPORT: CPT | Performed by: INTERNAL MEDICINE

## 2023-11-26 PROCEDURE — 99245 OFF/OP CONSLTJ NEW/EST HI 55: CPT | Performed by: PHYSICIAN ASSISTANT

## 2023-11-26 PROCEDURE — 99232 SBSQ HOSP IP/OBS MODERATE 35: CPT | Performed by: INTERNAL MEDICINE

## 2023-11-26 PROCEDURE — 82948 REAGENT STRIP/BLOOD GLUCOSE: CPT

## 2023-11-26 RX ORDER — ATORVASTATIN CALCIUM 40 MG/1
40 TABLET, FILM COATED ORAL
Status: DISCONTINUED | OUTPATIENT
Start: 2023-11-26 | End: 2023-11-27 | Stop reason: HOSPADM

## 2023-11-26 RX ADMIN — HEPARIN SODIUM 5000 UNITS: 5000 INJECTION INTRAVENOUS; SUBCUTANEOUS at 05:59

## 2023-11-26 RX ADMIN — HEPARIN SODIUM 5000 UNITS: 5000 INJECTION INTRAVENOUS; SUBCUTANEOUS at 14:27

## 2023-11-26 RX ADMIN — ATORVASTATIN CALCIUM 40 MG: 40 TABLET, FILM COATED ORAL at 16:07

## 2023-11-26 RX ADMIN — ASPIRIN 81 MG CHEWABLE TABLET 81 MG: 81 TABLET CHEWABLE at 08:47

## 2023-11-26 RX ADMIN — HEPARIN SODIUM 5000 UNITS: 5000 INJECTION INTRAVENOUS; SUBCUTANEOUS at 21:12

## 2023-11-26 RX ADMIN — INSULIN LISPRO 1 UNITS: 100 INJECTION, SOLUTION INTRAVENOUS; SUBCUTANEOUS at 16:07

## 2023-11-26 NOTE — PROGRESS NOTES
[address] [facility]  Progress Note  Name: Fermin Lei  MRN: [id number]  Unit/Bed#: ED 06 I Date of Admission: 11/25/2023   Date of Service: 11/26/2023 I Hospital Day: 1    Assessment/Plan   Cerebral atherosclerosis  Assessment & Plan  Noted on CTA head and neck with severe stenosis in multiple areas  Statin from home was continued and daily aspirin started. Persistent atrial fibrillation ([address])  Assessment & Plan  Holding home Xarelto per neurology  Holding home diltiazem and Toprol given the need for permissive hypertension  Telemetry    MISHA (obstructive sleep apnea)  Assessment & Plan  CPAP nightly    Type 2 diabetes mellitus with other specified complication [facility])  Assessment & Plan  Lab Results   Component Value Date    HGBA1C 7.0 (H) 11/22/2023       Recent Labs     11/25/23  2140   POCGLU 172*         Blood Sugar Average: Last 72 hrs:  (P) 172    Holding home Jardiance  Lispro SSI AC while inpatient      Hypertensive urgency  Assessment & Plan  On Cardizem, lisinopril, Toprol at home. Holding antihypertensives to allow for permissive hypertension up to 220 / 110 mmHg per neurology  Labetalol as needed    * Dizziness  Assessment & Plan  From prefer CVA versus TIA versus hypertensive urgency  Neurology consulted-stroke pathway with vitals and neurochecks  Hemoglobin A1c was recently obtained  Lipid panel ordered  CT head and CTA head and neck were reviewed. MRI brain ordered  Echocardiogram in August 2023 showed ejection fraction 65%  Telemetry  On aspirin and statin  Xarelto on hold until clot burden is determined  PT/OT             Labs & Imaging: I have personally reviewed pertinent reports. VTE Prophylaxis: in place. Code Status:   Level 1 - Full Code    Patient Centered Rounds: I have performed bedside rounds with nursing staff today.     Discussions with Specialists or Other Care Team Provider: Neurology    Education and Discussions with Family / Patient: Daughter at bedside    Current Length of Stay: 1 day(s)    Current Patient Status: Inpatient   Certification Statement: The patient will continue to require additional inpatient hospital stay due to see my assessment and plan. Subjective:   Patient is seen and examined at bedside. Dizziness symptoms have improved. No other complaints. Afebrile  All other ROS are negative. Objective:    Vitals: Blood pressure (!) 179/83, pulse 73, temperature 98 °F (36.7 °C), temperature source Oral, resp. rate 16, height 5' 5" (1.651 m), weight 87 kg (191 lb 12.8 oz), SpO2 100 %, not currently breastfeeding. ,Body mass index is 31.92 kg/m². SPO2 RA Rest      Flowsheet Row ED from 11/25/2023 in  [facility] Emergency Department   SpO2 100 %   SpO2 Activity At Rest   O2 Device None (Room air)   O2 Flow Rate --          I&O: No intake or output data in the 24 hours ending 11/26/23 0719    Physical Exam:    General- Alert, lying comfortably in bed. Not in any acute distress. Neck- Supple, No JVD  CVS- regular, S1 and S2 normal  Chest- Bilateral Air entry, No rhochi, crackles or wheezing present. Abdomen- soft, nontender, not distended, no guarding or rigidity, BS+  Extremities-  No pedal edema, No calf tenderness                         Normal ROM in all extremities. CNS-   Alert, awake and orientedx3. No focal deficits present. Speech is clear and coherent. No pronator drift.   No facial asymmetry    Invasive Devices       Peripheral Intravenous Line  Duration             Peripheral IV 11/25/23 Left Antecubital <1 day                          Social History  reviewed  Family History   Problem Relation Age of Onset    Diabetes Mother     Diabetes Father     Atrial fibrillation Father     Heart attack Father     Diabetes Paternal Grandmother         Daughter    Breast cancer Neg Hx     Uterine cancer Neg Hx     Ovarian cancer Neg Hx     Colon cancer Neg Hx     reviewed    Meds:  Current Facility-Administered Medications   Medication Dose Route Frequency Provider Last Rate Last Admin    acetaminophen (TYLENOL) tablet 650 mg  650 mg Oral Q6H PRN Kimberley Bridges PA-C        aspirin chewable tablet 81 mg  81 mg Oral Daily Kimberley Bridges PA-C        atorvastatin (LIPITOR) tablet 40 mg  40 mg Oral Daily With Kathryn Ibarra MD        glycerin-hypromellose- (ARTIFICIAL TEARS) ophthalmic solution 1 drop  1 drop Both Eyes Q4H PRN Kimberley Bridges PA-C        heparin (porcine) subcutaneous injection 5,000 Units  5,000 Units Subcutaneous [facility] Kimberley Bridges PA-C   5,000 Units at 11/26/23 0559    insulin lispro (HumaLOG) 100 units/mL subcutaneous injection 1-5 Units  1-5 Units Subcutaneous TID AC Kimberley Bridegs PA-C        labetalol (NORMODYNE) injection 10 mg  10 mg Intravenous Q6H PRN Kimberley Bridges PA-C         Current Outpatient Medications   Medication Sig Dispense Refill    betamethasone dipropionate (DIPROSONE) 0.05 % cream Pea sized external application twice daily for 3 weeks, then daily for 3 weeks, then every other day. 30 g 2    Blood Glucose Monitoring Suppl (ONE TOUCH ULTRA 2) w/Device KIT use as directed by prescriber twice a day      Clobetasol Prop Emollient Base 0.05 % emollient cream 1 Application Every 12 hours      diltiazem (CARDIZEM CD) 240 mg 24 hr capsule TAKE 1 CAPSULE DAILY 90 capsule 3    Jardiance 10 MG TABS Take 10 mg by mouth daily      Lancets (OneTouch Delica Plus [id number]) MISC use as directed by prescriber twice a day      lisinopril (ZESTRIL) 20 mg tablet Take 1 tablet (20 mg total) by mouth daily 90 tablet 3    metoprolol succinate (TOPROL-XL) 50 mg 24 hr tablet TAKE 1 TABLET DAILY 90 tablet 3    nystatin (MYCOSTATIN) cream Pea sized external application 1-2 times daily as needed.  30 g 3    OneTouch Ultra test strip use as directed by prescriber twice a day      rivaroxaban (Xarelto) 20 mg tablet Take 1 tablet (20 mg total) by mouth daily 90 tablet 3      (Not in a hospital admission)      Labs:  Results from last 7 days   Lab Units 11/26/23  0551 11/25/23 2133   WBC Thousand/uL 7.40 7.50   HEMOGLOBIN g/dL 15.0 16.4*   HEMATOCRIT % 46.4* 49.7*   PLATELETS Thousands/uL 207 226     Results from last 7 days   Lab Units 11/25/23 2133 11/22/23  0918   POTASSIUM mmol/L 3.7 4.1   CHLORIDE mmol/L 104 104   CO2 mmol/L 24 29   BUN mg/dL 20 14   CREATININE mg/dL 0.72 0.73   CALCIUM mg/dL 9.8 9.5   ALK PHOS U/L  --  43   ALT U/L  --  12   AST U/L  --  12*     Lab Results   Component Value Date    TROPONINI <0.02 06/27/2021    TROPONINI <0.02 06/27/2021     Results from last 7 days   Lab Units 11/25/23 2133   INR  1.12     No results found for: "BLOODCX", "Nelson Fosston", "WOUNDCULT", "Lieutenant Inch"      Imaging:  Results for orders placed during the hospital encounter of 06/27/21    XR chest portable    Narrative  CHEST    INDICATION:   sob. COMPARISON:  None. EXAM PERFORMED/VIEWS:  XR CHEST PORTABLE. FINDINGS:    Cardiomediastinal silhouette normal.    Lungs clear. No effusion or pneumothorax. Osseous structures normal for age. Impression  No acute cardiopulmonary disease. Workstation performed: [id number]    No results found for this or any previous visit.       Last 24 Hours Medication List:   Current Facility-Administered Medications   Medication Dose Route Frequency Provider Last Rate    acetaminophen  650 mg Oral Q6H PRN Kimberley Bridges PA-C      aspirin  81 mg Oral Daily Kimberley Bridges PA-C      atorvastatin  40 mg Oral Daily With MELITON Mcclain MD      glycerin-hypromellose-  1 drop Both Eyes Q4H PRN Kimberley Bridges PA-C      heparin (porcine)  5,000 Units Subcutaneous Q8H [facility] Kimberley Bridges PA-C      insulin lispro  1-5 Units Subcutaneous TID AC Kimberley Bridges PA-C      labetalol  10 mg Intravenous Q6H PRN June Jung PA-C          Today, Patient Was Seen By: Hector Hansen MD    ** Please Note: Dictation voice to text software may have been used in the creation of this document.  **

## 2023-11-26 NOTE — ASSESSMENT & PLAN NOTE
From prefer CVA versus TIA versus hypertensive urgency  Neurology consulted-stroke pathway with vitals and neurochecks  Hemoglobin A1c was recently obtained  Lipid panel ordered  CT head and CTA head and neck were reviewed.   MRI brain ordered  Echocardiogram in August 2023 showed ejection fraction 65%  Telemetry  On aspirin and statin  Xarelto on hold until clot burden is determined  PT/OT

## 2023-11-26 NOTE — H&P
[facility]  H&P  Name: Atul Nunez 79 y.o. female I MRN: [id number]  Unit/Bed#: ED 06 I Date of Admission: 11/25/2023   Date of Service: 11/26/2023 I Hospital Day: 1      Assessment/Plan   * Dizziness  Assessment & Plan  From prefer CVA versus TIA versus hypertensive urgency  Neurology consulted-stroke pathway with vitals and neurochecks  Hemoglobin A1c was recently obtained  Lipid panel ordered  CT head and CTA head and neck were reviewed. MRI brain ordered  Had a recent echo  Telemetry    Cerebral atherosclerosis  Assessment & Plan  Noted on CTA head and neck with severe stenosis in multiple areas  Statin from home was continued and daily aspirin started. Persistent atrial fibrillation ([address])  Assessment & Plan  Holding home Xarelto per neurology  Holding home diltiazem and Toprol given the need for permissive hypertension  Telemetry    MISHA (obstructive sleep apnea)  Assessment & Plan  CPAP nightly    Type 2 diabetes mellitus with other specified complication [facility])  Assessment & Plan  Lab Results   Component Value Date    HGBA1C 7.0 (H) 11/22/2023       Recent Labs     11/25/23  2140   POCGLU 172*       Blood Sugar Average: Last 72 hrs:  (P) 172    Holding home Jardiance  Lispro SSI AC while inpatient      Hypertensive urgency  Assessment & Plan  On Cardizem, lisinopril, Toprol at home. Holding antihypertensives to allow for permissive hypertension up to 220 / 110 mmHg per neurology       VTE Pharmacologic Prophylaxis: VTE Score: 9 High Risk (Score >/= 5) - Pharmacological DVT Prophylaxis Ordered: heparin. Sequential Compression Devices Ordered. Code Status: Level 1 - Full Code   Discussion with family:  patient alone. Anticipated Length of Stay: Patient will be admitted on an inpatient basis with an anticipated length of stay of greater than 2 midnights secondary to tele, mri brain, neuro consult, stroke pathway.     Total Time Spent on Date of Encounter in care of patient: 55 mins. This time was spent on one or more of the following: performing physical exam; counseling and coordination of care; obtaining or reviewing history; documenting in the medical record; reviewing/ordering tests, medications or procedures; communicating with other healthcare professionals and discussing with patient's family/caregivers. Chief Complaint: dizziness x 2 hours    History of Present Illness:  Jason Ortiz is a 79 y.o. female with a PMH of A-fib on Xarelto, CAD, obesity, hypertension, MISHA, type 2 diabetes not on insulin who presents with dizziness abruptly starting at 7:50 PM on 11/25/2023 when she was sitting watching TV. Worse with any movement of the head or body, laying down in her recliner did not help. Also associated with nausea. She checked her blood pressure and it was elevated 170s /117. She called her daughter to come to her home. She vomited once and daughter brought her to the emergency room. Patient has never felt dizzy like this before and she has not been having intermittent dizziness or lightheadedness before this. During this time she was also having left eye pain like there was something in her eye, slight blurred vision. Denies facial droop, slurred speech, trouble getting her words out. Denies chest pain, shortness of breath or palpitations. She has been struggling with low energy, excessive tiredness, fatigue for weeks and in the last few days reports she has had to motivate herself even to go out for a walk. Denies congestion, sore throat, myalgia. Denies headache or neck pain. Denies abdominal pain. Review of Systems:  Review of Systems   Constitutional:  Positive for fatigue. Negative for appetite change and fever. HENT:  Negative for congestion, postnasal drip, sore throat, trouble swallowing and voice change. Eyes:  Positive for pain and visual disturbance. Negative for photophobia.         Left eye   Respiratory:  Negative for shortness of breath. Cardiovascular:  Negative for chest pain, palpitations and leg swelling. Gastrointestinal:  Positive for nausea and vomiting. Endocrine: Negative. Genitourinary: Negative. Musculoskeletal:  Negative for neck pain. Skin: Negative. Allergic/Immunologic: Negative. Neurological:  Positive for dizziness. Negative for tremors, seizures, syncope, facial asymmetry, speech difficulty, weakness, light-headedness, numbness and headaches. Hematological:  Bruises/bleeds easily. Xarelto   Psychiatric/Behavioral:  Negative for confusion. Past Medical and Surgical History:   Past Medical History:   Diagnosis Date    A-fib ([address])     BMI 30.0-30.9,adult     Chronic pain     Coronary artery disease 2021    A Fib    Epistaxis     Fibroid     Hypertension     Lumbar spondylosis     Sleep apnea     CPAP    Type 2 DM        Past Surgical History:   Procedure Laterality Date    COLONOSCOPY  2020    no further    DILATION AND EVACUATION      DXA PROCEDURE (HISTORICAL)      MAMMO (HISTORICAL)      2018    TOTAL ABDOMINAL HYSTERECTOMY W/ BILATERAL SALPINGOOPHORECTOMY  1994    Fibroids - old records reviewed       Meds/Allergies:  Prior to Admission medications    Medication Sig Start Date End Date Taking? Authorizing Provider   betamethasone dipropionate (DIPROSONE) 0.05 % cream Pea sized external application twice daily for 3 weeks, then daily for 3 weeks, then every other day.  6/12/23   Fani Chris MD   Blood Glucose Monitoring Suppl (ONE TOUCH ULTRA 2) w/Device KIT use as directed by prescriber twice a day 7/27/21   Historical Provider, MD   Clobetasol Prop Emollient Base 0.05 % emollient cream 1 Application Every 12 hours    Historical Provider, MD   diltiazem (CARDIZEM CD) 240 mg 24 hr capsule TAKE 1 CAPSULE DAILY 11/11/22   Nellie Sanford MD   Jardiance 10 MG TABS Take 10 mg by mouth daily 7/15/21   Historical Provider, MD   Lancets (OneTouch Delica Plus [id number]) MISC use as directed by prescriber twice a day 7/27/21   Historical Provider, MD   lisinopril (ZESTRIL) 20 mg tablet Take 1 tablet (20 mg total) by mouth daily 3/7/22   Lazara Diamond MD   metoprolol succinate (TOPROL-XL) 50 mg 24 hr tablet TAKE 1 TABLET DAILY 11/11/22   Lazara Diamond MD   nystatin (MYCOSTATIN) cream Pea sized external application 1-2 times daily as needed. 7/27/23   Alton Dee MD   OneTouch Ultra test strip use as directed by prescriber twice a day 7/27/21   Historical Provider, MD   rivaroxaban (Xarelto) 20 mg tablet Take 1 tablet (20 mg total) by mouth daily 2/21/23   Lazara Diamond MD     I have reviewed home medications with patient personally. Allergies: Allergies   Allergen Reactions    Codeine     Other Other (See Comments)    Sulfa Antibiotics        Social History:  Marital Status:     Occupation: none  Patient Pre-hospital Living Situation: Home  Patient Pre-hospital Level of Mobility: walks  Patient Pre-hospital Diet Restrictions: none  Substance Use History:   Social History     Substance and Sexual Activity   Alcohol Use Yes    Comment: socially     Social History     Tobacco Use   Smoking Status Never   Smokeless Tobacco Never     Social History     Substance and Sexual Activity   Drug Use No       Family History:  Family History   Problem Relation Age of Onset    Diabetes Mother     Diabetes Father     Atrial fibrillation Father     Heart attack Father     Diabetes Paternal Grandmother         Daughter    Breast cancer Neg Hx     Uterine cancer Neg Hx     Ovarian cancer Neg Hx     Colon cancer Neg Hx        Physical Exam:     Vitals:   Blood Pressure: (!) 177/81 (11/25/23 2300)  Pulse: 82 (11/25/23 2300)  Temperature: 98 °F (36.7 °C) (11/25/23 2111)  Temp Source: Oral (11/25/23 2111)  Respirations: 16 (11/25/23 2300)  Height: 5' 5" (165.1 cm) (11/25/23 2111)  Weight - Scale: 87 kg (191 lb 12.8 oz) (11/25/23 2147)  SpO2: 99 % (11/25/23 2300)    Physical Exam  Vitals and nursing note reviewed. Constitutional:       General: She is not in acute distress. Appearance: She is not ill-appearing, toxic-appearing or diaphoretic. HENT:      Head: Normocephalic and atraumatic. Nose: No congestion. Mouth/Throat:      Mouth: Mucous membranes are moist.   Eyes:      General: No visual field deficit. Comments: Left pupil more dilated than the right, sluggish left pupillary light reflex    Left eye with very slight deviation to the right side, there is full EOM   Neck:      Vascular: No carotid bruit. Cardiovascular:      Rate and Rhythm: Normal rate. Rhythm irregular. Heart sounds: No murmur heard. Pulmonary:      Effort: Pulmonary effort is normal.   Abdominal:      Palpations: Abdomen is soft. Tenderness: There is no abdominal tenderness. Musculoskeletal:         General: No swelling. Normal range of motion. Cervical back: Normal range of motion. No rigidity or tenderness. Skin:     General: Skin is warm and dry. Neurological:      Mental Status: She is alert. Cranial Nerves: No dysarthria or facial asymmetry. Sensory: No sensory deficit. Motor: No weakness or pronator drift.    Psychiatric:      Comments: Flat affect          Additional Data:     Lab Results:  Results from last 7 days   Lab Units 11/25/23 2133   WBC Thousand/uL 7.50   HEMOGLOBIN g/dL 16.4*   HEMATOCRIT % 49.7*   PLATELETS Thousands/uL 226     Results from last 7 days   Lab Units 11/25/23 2133 11/22/23  0918   SODIUM mmol/L 139 143   POTASSIUM mmol/L 3.7 4.1   CHLORIDE mmol/L 104 104   CO2 mmol/L 24 29   BUN mg/dL 20 14   CREATININE mg/dL 0.72 0.73   ANION GAP mmol/L 11 10   CALCIUM mg/dL 9.8 9.5   ALBUMIN g/dL  --  4.1   TOTAL BILIRUBIN mg/dL  --  0.69   ALK PHOS U/L  --  43   ALT U/L  --  12   AST U/L  --  12*   GLUCOSE RANDOM mg/dL 171*  --      Results from last 7 days   Lab Units 11/25/23 2133   INR  1.12     Results from last 7 days   Lab Units 11/25/23 2140   POC GLUCOSE mg/dl 172*     Results from last 7 days   Lab Units 11/22/23  0918   HEMOGLOBIN A1C % 7.0*           Lines/Drains:  Invasive Devices       Peripheral Intravenous Line  Duration             Peripheral IV 11/25/23 Left Antecubital <1 day                        Imaging: Reviewed radiology reports from this admission including: CT head and cta head & neck and Personally reviewed the following imaging: chest xray  X-ray chest 1 view portable   ED Interpretation by Juvencio Xiao DO (11/25 2208)   No acute abnormalities as interpreted by me independently       CTA stroke alert (head/neck)   Final Result by Bernardo Machado MD (11/25 2213)      Severe plaque stenosis origin of the left vertebral artery. Focal moderate to high-grade plaque stenosis right intracranial vertebral artery. Multifocal high-grade near occlusive plaque stenosis intracranial left vertebral artery. 5mm craniocaudal segment of moderate to severe plaque stenosis mid basilar artery. I personally discussed this study with Dr. Jerry Melgar on 11/25/2023 9:45 PM.                           Workstation performed: [id number]         CT stroke alert brain   Final Result by Bernardo Machado MD (11/25 2148)      No acute intracranial abnormality. Chronic microangiopathic changes. I personally discussed this study with Dr. Jerry Melgar on 11/25/2023 9:45 PM.            Workstation performed: [id number]         MRI Inpatient Order    (Results Pending)       EKG and Other Studies Reviewed on Admission:   EKG: Atrial fibrillation. HR 80.    ** Please Note: This note has been constructed using a voice recognition system.  **

## 2023-11-26 NOTE — ASSESSMENT & PLAN NOTE
On Cardizem, lisinopril, Toprol at home.   Holding antihypertensives to allow for permissive hypertension up to 220 / 110 mmHg per neurology  Labetalol as needed

## 2023-11-26 NOTE — UTILIZATION REVIEW
NOTIFICATION OF INPATIENT ADMISSION   AUTHORIZATION REQUEST   SERVICING FACILITY:   [address] [place] [address]  [address] [facility] [address]  Tax ID: [id number]  NPI: [id number] ATTENDING PROVIDER:  Attending Name and NPI#: Fouzia Sun Md [[id number]]  Address: [address] [facility],[address] [place] [address]  Phone: [phone]   ADMISSION INFORMATION:  Place of Service: [address] [place] [address]  Place of Service Code: 21  Inpatient Admission Date/Time: 11/25/23 10:34 PM  Discharge Date/Time: No discharge date for patient encounter. Admitting Diagnosis Code/Description:  Hypertension [I10]     UTILIZATION REVIEW CONTACT:  Itz Romero, Utilization   Network Utilization Review Department  Phone: [phone]  Fax [phone]  Email: [email or web address]  Contact for approvals/pending authorizations, clinical reviews, and discharge. PHYSICIAN ADVISORY SERVICES:  Medical Necessity Denial & Lilj-rg-Klqe Review  Phone: [phone]  Fax: [phone]  Email: [email or web address]     DISCHARGE SUPPORT TEAM:  For Patients Discharge Needs & Updates  Phone: [phone] opt. 2 Fax: [phone]  Email: [email or web address]

## 2023-11-26 NOTE — ASSESSMENT & PLAN NOTE
Lab Results   Component Value Date    HGBA1C 7.0 (H) 11/22/2023       Recent Labs     11/25/23  2140   POCGLU 172*         Blood Sugar Average: Last 72 hrs:  (P) 172    Holding home Jardiance  Lispro SSI AC while inpatient

## 2023-11-26 NOTE — ED NOTES
Patient ambulated to bathroom with minimal assist. Patient endorses an increase in dizziness with movement.      Radha Nelson RN  11/26/23 2533

## 2023-11-26 NOTE — ASSESSMENT & PLAN NOTE
On Cardizem, lisinopril, Toprol at home.   Holding antihypertensives to allow for permissive hypertension up to 220 / 110 mmHg per neurology

## 2023-11-26 NOTE — ASSESSMENT & PLAN NOTE
Holding home Xarelto per neurology  Holding home diltiazem and Toprol given the need for permissive hypertension  Telemetry

## 2023-11-26 NOTE — CONSULTS
Consultation - Neurology   Nel Boo 79 y.o. female MRN: [id number]  Unit/Bed#: ED 06 Encounter: [id number]      Assessment/Plan     * Dizziness  Assessment & Plan  79year old female with afib on Xarelto, HTN, HLD, DM type 2, and chronic pain presented to the ED for evaluation of acute onset room spinning dizziness, beginning while sitting in a chair, and accompanied by nausea/vomiting, gait imbalance, and mild horizontally stacked double vision. Symptoms lasted about 3 hours. A stroke alert was activated in the ED. Initial BP was 211/105, but peaked to 240/100. NIHSS 0 per ED. CTA noted severe plaque stenosis at the origin of the left vertebral artery, focal moderate to high-grade plaque stenosis of the right intracranial vertebral artery, multifocal high-grade near occlusive plaque stenosis of the intracranial left vertebral artery, and moderate to severe plaque stenosis of the mid basilar artery. She was not a TNK candidate due to compliance with Xarelto. She was not a thrombectomy candidate. At this time, suspicious for posterior circulation stroke vs TIA due to significant atherosclerotic disease. Plan:  - Stroke pathway  MRI brain pending  Echo can be deferred at this time. Lipid panel: , HDL 49, ,   Hemoglobin A1c pending  Xarelto currently on hold. Continue to hold at least until evaluating stroke burden. S/p Aspirin 325 mg x 1. Continue Aspirin 81 mg at this time. Most likely will continue patient on this along with Xarelto at discharge. Continue Atorvastatin 40 mg  Permissive HTN, labetalol if SBP >200  Continue telemetry  PT/OT/ST  Frequent neuro checks. Continue to monitor and notify neurology with any changes. - Medical management and supportive care per primary team. Correction of any metabolic or infectious disturbances. Recommendations for outpatient neurological follow up have yet to be determined.  Likely will require outpatient neurovascular follow up with AP or attending in 4-6 weeks. History of Present Illness     Reason for Consult / Principal Problem: Stroke alert      HPI: Chaka Key is a 79 y.o. female with atrial fibrillation on Xarelto, hypertension, diabetes mellitus type 2, HLD, chronic pain syndrome, and obesity, who presented to the ED for evaluation of room spinning dizziness with associated nausea/vomiting. Symptoms began acutely while sitting and watching TV. She noted some horizontal double vision. Patient reported that movement made her symptoms worse and nothing made her symptoms better. She had to hold onto things when ambulating. She was not falling into any particular direction. She denied any coordination issues. She denied any other focal neurologic symptoms such as speech changes, difficulty swallowing, or focal weakness. Her blood pressure at home reportedly was 176/117. In the ED, her BP on arrival was 211/105 and peaked as high as 240/100. A stroke alert was activated in the ED and Dr. Jane Carroll responded via phone. CT head noted chronic microangiopathic changes. CTA noted severe plaque stenosis at the origin of the left vertebral artery, focal moderate to high-grade plaque stenosis of the right intracranial vertebral artery, multifocal high-grade near occlusive plaque stenosis of the intracranial left vertebral artery, and 5 mm craniocaudal segment of moderate to severe plaque stenosis of the mid basilar artery. Patient was not a TNK candidate because she has been compliant with her Eliquis. Last dose was yesterday evening, just prior to coming in. It was recommended that Eliquis be held on admission until stroke burden is clarified and that patient be initiated on aspirin. She was not on any antiplatelets prior. Symptoms were present for at least 3 hours.      Inpatient consult to Neurology  Consult performed by: Frederick Felder PA-C  Consult ordered by: Michelle Fajardo PA-C          Review of Systems   Gastrointestinal:         Nausea resolved   Neurological:         Dizziness resolved         Historical Information   Past Medical History:   Diagnosis Date    A-fib ([address])     BMI 30.0-30.9,adult     Chronic pain     Coronary artery disease 2021    A Fib    Epistaxis     Fibroid     Hypertension     Lumbar spondylosis     Sleep apnea     CPAP    Type 2 DM      Past Surgical History:   Procedure Laterality Date    COLONOSCOPY  2020    no further    DILATION AND EVACUATION      DXA PROCEDURE (HISTORICAL)      MAMMO (HISTORICAL)      2018    TOTAL ABDOMINAL HYSTERECTOMY W/ BILATERAL SALPINGOOPHORECTOMY  1994    Fibroids - old records reviewed     Social History   Social History     Substance and Sexual Activity   Alcohol Use Yes    Comment: socially     Social History     Substance and Sexual Activity   Drug Use No     E-Cigarette/Vaping    E-Cigarette Use Never User      E-Cigarette/Vaping Substances    Nicotine No     THC No     CBD No     Flavoring No     Other No     Unknown No      Social History     Tobacco Use   Smoking Status Never   Smokeless Tobacco Never     Family History:   Family History   Problem Relation Age of Onset    Diabetes Mother     Diabetes Father     Atrial fibrillation Father     Heart attack Father     Diabetes Paternal Grandmother         Daughter    Breast cancer Neg Hx     Uterine cancer Neg Hx     Ovarian cancer Neg Hx     Colon cancer Neg Hx        Review of previous medical records was completed.      Meds/Allergies   all current active meds have been reviewed, current meds:   Current Facility-Administered Medications   Medication Dose Route Frequency    acetaminophen (TYLENOL) tablet 650 mg  650 mg Oral Q6H PRN    aspirin chewable tablet 81 mg  81 mg Oral Daily    atorvastatin (LIPITOR) tablet 40 mg  40 mg Oral Daily With Dinner    glycerin-hypromellose- (ARTIFICIAL TEARS) ophthalmic solution 1 drop  1 drop Both Eyes Q4H PRN    heparin (porcine) subcutaneous injection 5,000 Units  5,000 Units Subcutaneous Q8H [address]    insulin lispro (HumaLOG) 100 units/mL subcutaneous injection 1-5 Units  1-5 Units Subcutaneous TID AC    labetalol (NORMODYNE) injection 10 mg  10 mg Intravenous Q6H PRN   , and PTA meds:   Prior to Admission Medications   Prescriptions Last Dose Informant Patient Reported? Taking? Blood Glucose Monitoring Suppl (ONE TOUCH ULTRA 2) w/Device KIT  Self Yes No   Sig: use as directed by prescriber twice a day   Clobetasol Prop Emollient Base 0.05 % emollient cream   Yes No   Si Application Every 12 hours   Jardiance 10 MG TABS  Self Yes No   Sig: Take 10 mg by mouth daily   Lancets (OneTouch Delica Plus [id number]) MISC  Self Yes No   Sig: use as directed by prescriber twice a day   OneTouch Ultra test strip  Self Yes No   Sig: use as directed by prescriber twice a day   betamethasone dipropionate (DIPROSONE) 0.05 % cream  Self No No   Sig: Pea sized external application twice daily for 3 weeks, then daily for 3 weeks, then every other day. diltiazem (CARDIZEM CD) 240 mg 24 hr capsule  Self No No   Sig: TAKE 1 CAPSULE DAILY   lisinopril (ZESTRIL) 20 mg tablet  Self No No   Sig: Take 1 tablet (20 mg total) by mouth daily   metoprolol succinate (TOPROL-XL) 50 mg 24 hr tablet  Self No No   Sig: TAKE 1 TABLET DAILY   nystatin (MYCOSTATIN) cream   No No   Sig: Pea sized external application 1-2 times daily as needed. rivaroxaban (Xarelto) 20 mg tablet  Self No No   Sig: Take 1 tablet (20 mg total) by mouth daily      Facility-Administered Medications: None       Allergies   Allergen Reactions    Codeine     Other Other (See Comments)    Sulfa Antibiotics        Objective   Vitals:Blood pressure 163/82, pulse 70, temperature 98 °F (36.7 °C), temperature source Oral, resp. rate 16, height 5' 5" (1.651 m), weight 87 kg (191 lb 12.8 oz), SpO2 99 %, not currently breastfeeding. ,Body mass index is 31.92 kg/m².   No intake or output data in the 24 hours ending 23 1018    Invasive Devices: Invasive Devices       Peripheral Intravenous Line  Duration             Peripheral IV 11/25/23 Left Antecubital <1 day                    Physical Exam  Vitals and nursing note reviewed. Constitutional:       General: She is not in acute distress. Appearance: She is not ill-appearing, toxic-appearing or diaphoretic. HENT:      Head: Normocephalic and atraumatic. Nose: Nose normal.      Mouth/Throat:      Mouth: Mucous membranes are moist.      Pharynx: Oropharynx is clear. Eyes:      General: No scleral icterus. Right eye: No discharge. Left eye: No discharge. Extraocular Movements: Extraocular movements intact and EOM normal.   Cardiovascular:      Rate and Rhythm: Normal rate. Pulmonary:      Effort: Pulmonary effort is normal. No respiratory distress. Musculoskeletal:         General: No swelling or tenderness. Cervical back: Normal range of motion. Right lower leg: No edema. Left lower leg: No edema. Skin:     General: Skin is warm and dry. Coloration: Skin is not jaundiced or pale. Neurological:      Mental Status: She is alert and oriented to person, place, and time. Motor: Motor strength is normal.     Coordination: Finger-Nose-Finger Test and Heel to [facility] Test normal.      Deep Tendon Reflexes:      Reflex Scores:       Tricep reflexes are 2+ on the right side and 2+ on the left side. Bicep reflexes are 2+ on the right side and 2+ on the left side. Brachioradialis reflexes are 2+ on the right side and 2+ on the left side. Patellar reflexes are 2+ on the right side and 2+ on the left side. Achilles reflexes are 2+ on the right side and 2+ on the left side. Comments: Detailed below   Psychiatric:         Mood and Affect: Mood normal.         Speech: Speech normal.         Behavior: Behavior normal.         Thought Content:  Thought content normal.         Judgment: Judgment normal. Neurologic Exam     Mental Status   Oriented to person, place, and time. Attention: normal. Concentration: normal.   Speech: speech is normal   Level of consciousness: alert  Knowledge: good. Able to name object. Able to read. Able to repeat. Follows multistep commands without difficulty. Cranial Nerves     CN II   Visual fields full to confrontation. CN III, IV, VI   Extraocular motions are normal.   Right pupil: Size: 4 mm. Reactivity: brisk. Left pupil: Size: 5 mm. Reactivity: non-reactive. Nystagmus: none     CN V   Facial sensation intact. CN VII   Left facial weakness: Left eyelid ptosis but remainder of face symmetric. CN VIII   Hearing: intact    CN IX, X   Palate: asymmetric (L palate does not raise as much.)    L eye- Esodeviation at baseline. No skew. Motor Exam   Right arm pronator drift: absent  Left arm pronator drift: absent    Strength   Strength 5/5 throughout. Sensory Exam   Light touch normal.     Gait, Coordination, and Reflexes     Coordination   Finger to nose coordination: normal  Heel to shin coordination: normal    Tremor   Resting tremor: absent    Reflexes   Right brachioradialis: 2+  Left brachioradialis: 2+  Right biceps: 2+  Left biceps: 2+  Right triceps: 2+  Left triceps: 2+  Right patellar: 2+  Left patellar: 2+  Right achilles: 2+  Left achilles: 2+  Right plantar: normal  Left plantar: equivocal      Lab Results: I have personally reviewed pertinent reports. Imaging Studies: I have personally reviewed pertinent reports. and I have personally reviewed pertinent films in PACS (CT/CTA)  EKG, Pathology, and Other Studies: I have personally reviewed pertinent reports.     VTE Prophylaxis: Sequential compression device (Venodyne)  and Heparin    Code Status: Level 1 - Full Code    History and physical examination obtained by attending neurologist. AP in room as witness to history and physical examination and acted solely as a scribe for attending neurologist for this encounter. All medical decision making per attending.

## 2023-11-26 NOTE — ASSESSMENT & PLAN NOTE
From prefer CVA versus TIA versus hypertensive urgency  Neurology consulted-stroke pathway with vitals and neurochecks  Hemoglobin A1c was recently obtained  Lipid panel ordered  CT head and CTA head and neck were reviewed.   MRI brain ordered  Had a recent echo  Telemetry

## 2023-11-26 NOTE — CONSULTS
NEUROLOGY STROKE ALERT TELEPHONE NOTE    I did not personally see or examine the patient at time of her ED presentation, but I discussed the patient in real-time with the onsite ED provider. I discussed at length with the ED provider patient's medical history, presenting symptoms, physical exam, vital signs, initial blood work, neuroimaging, and workup and treatment recommendations. I personally reviewed all pertinent neuroimaging and discussed her imaging findings with the reading radiologist. Please see my following comments for details and adjustments. Critical care time, excluding procedures, teaching, family meetings, and excludes any prior time recorded by providers other than myself, 60 minutes. 59-year-old female with past medical history pertinent for fib on Eliquis, HTN, HLD, obesity, T2DM, chronic pain syndrome who presents with a spontaneous acute vestibular syndrome with associated nausea and vomiting. Onset of symptoms abruptly at 19:50. Stroke alert activated at 21:28 and neurology response via telephone was immediate. In the ED initial /105. Initial . Patient is on Eliquis and last dose was 19:00. Initial NIHSS reportedly 0 and more comprehensive exam reportedly revealed no nystagmus, head impulse was not tested, skew was not tested, no limb ataxia, gait was not assessed. CT head revealed no acute infarct or hemorrhage, but does show diffuse parenchymal atrophy and bilateral basal ganglia calcifications. CTA head/neck revealed no LVO, but did show moderate to severe stenosis of left vert origin, moderate to severe stenosis of right intradural vert, critical near occlusive intradural left vert stenosis, severe mid basilar stenosis, moderate to severe right P1/P2 stenosis. No TNK due to Eliquis use.   Discussed case briefly over TT with endovascular due to concern for potential clinical worsening once collaterals fail given profound hypertension on presentation, but there is felt to be no indication for posterior circulation stenting especially considering she was not previously on any antiplatelets. Admit on stroke pathway. Permissive hypertension with BP less than 220/110. Hold PTA Eliquis. Loaded with aspirin 325 mg then continue aspirin 81 mg daily. Technically she would be a DAPT candidate based on CHANCE/POINT and [facility] trials, however as she just took her PTA Eliquis 3 and half hours ago she would essentially be on triple therapy for the next 12 hours putting her at significantly increased risk of bleeding (especially considering her history of epistaxis). Therefore I favor aspirin monotherapy until MRI reveals stroke burden, at which point we can decide on optimal timing for reinitiation of anticoagulation. Anticipate she will eventually go out on combination anticoagulation and antiplatelet. If MRI confirms stroke, etiology would be atheroembolic secondary to significant posterior circulation atherosclerotic disease. Suleiman Orosco MD  Neurology  11/25/23    This note was completed in part utilizing [employer]. Grammatical errors, random word insertions, spelling mistakes, and incomplete sentences may be an occasional consequence of this system secondary to software limitations, ambient noise, and hardware issues. If you have any questions or concerns about the content, text, or information contained within the body of this dictation, please contact the provider for clarification.

## 2023-11-26 NOTE — ASSESSMENT & PLAN NOTE
79year old female with afib on Xarelto, HTN, HLD, DM type 2, and chronic pain presented to the ED for evaluation of acute onset room spinning dizziness, beginning while sitting in a chair, and accompanied by nausea/vomiting, gait imbalance, and mild horizontally stacked double vision. Symptoms lasted about 3 hours. A stroke alert was activated in the ED. Initial BP was 211/105, but peaked to 240/100. NIHSS 0 per ED. CTA noted severe plaque stenosis at the origin of the left vertebral artery, focal moderate to high-grade plaque stenosis of the right intracranial vertebral artery, multifocal high-grade near occlusive plaque stenosis of the intracranial left vertebral artery, and moderate to severe plaque stenosis of the mid basilar artery. She was not a TNK candidate due to compliance with Xarelto. She was not a thrombectomy candidate. At this time, suspicious for posterior circulation stroke vs TIA due to significant atherosclerotic disease. Plan:  - Stroke pathway  MRI brain pending  Echo can be deferred at this time. Lipid panel: , HDL 49, ,   Hemoglobin A1c 7.0  Xarelto currently on hold. Continue to hold at least until evaluating stroke burden. S/p Aspirin 325 mg x 1. Continue Aspirin 81 mg at this time. Most likely will continue patient on this along with Xarelto at discharge. Continue Atorvastatin 40 mg  Permissive HTN, labetalol if SBP >200  Continue telemetry  PT/OT/ST  Frequent neuro checks. Continue to monitor and notify neurology with any changes. - Medical management and supportive care per primary team. Correction of any metabolic or infectious disturbances.

## 2023-11-26 NOTE — ASSESSMENT & PLAN NOTE
Noted on CTA head and neck with severe stenosis in multiple areas  Statin from home was continued and daily aspirin started.

## 2023-11-27 VITALS
HEART RATE: 102 BPM | TEMPERATURE: 98 F | BODY MASS INDEX: 31.96 KG/M2 | DIASTOLIC BLOOD PRESSURE: 83 MMHG | OXYGEN SATURATION: 95 % | HEIGHT: 65 IN | RESPIRATION RATE: 17 BRPM | SYSTOLIC BLOOD PRESSURE: 181 MMHG | WEIGHT: 191.8 LBS

## 2023-11-27 LAB
ANION GAP SERPL CALCULATED.3IONS-SCNC: 7 MMOL/L
BASOPHILS # BLD AUTO: 0.04 THOUSANDS/ÂΜL (ref 0–0.1)
BASOPHILS NFR BLD AUTO: 1 % (ref 0–1)
BUN SERPL-MCNC: 17 MG/DL (ref 5–25)
CALCIUM SERPL-MCNC: 8.5 MG/DL (ref 8.4–10.2)
CHLORIDE SERPL-SCNC: 107 MMOL/L (ref 96–108)
CHOLEST SERPL-MCNC: 177 MG/DL
CO2 SERPL-SCNC: 26 MMOL/L (ref 21–32)
CREAT SERPL-MCNC: 0.67 MG/DL (ref 0.6–1.3)
EOSINOPHIL # BLD AUTO: 0.16 THOUSAND/ÂΜL (ref 0–0.61)
EOSINOPHIL NFR BLD AUTO: 3 % (ref 0–6)
ERYTHROCYTE [DISTWIDTH] IN BLOOD BY AUTOMATED COUNT: 13.2 % (ref 11.6–15.1)
GFR SERPL CREATININE-BSD FRML MDRD: 80 ML/MIN/1.73SQ M
GLUCOSE SERPL-MCNC: 123 MG/DL (ref 65–140)
GLUCOSE SERPL-MCNC: 139 MG/DL (ref 65–140)
GLUCOSE SERPL-MCNC: 192 MG/DL (ref 65–140)
HCT VFR BLD AUTO: 47 % (ref 34.8–46.1)
HDLC SERPL-MCNC: 37 MG/DL
HGB BLD-MCNC: 15.3 G/DL (ref 11.5–15.4)
IMM GRANULOCYTES # BLD AUTO: 0.02 THOUSAND/UL (ref 0–0.2)
IMM GRANULOCYTES NFR BLD AUTO: 0 % (ref 0–2)
LDLC SERPL CALC-MCNC: 116 MG/DL (ref 0–100)
LYMPHOCYTES # BLD AUTO: 1.15 THOUSANDS/ÂΜL (ref 0.6–4.47)
LYMPHOCYTES NFR BLD AUTO: 22 % (ref 14–44)
MAGNESIUM SERPL-MCNC: 1.9 MG/DL (ref 1.9–2.7)
MCH RBC QN AUTO: 29.5 PG (ref 26.8–34.3)
MCHC RBC AUTO-ENTMCNC: 32.6 G/DL (ref 31.4–37.4)
MCV RBC AUTO: 91 FL (ref 82–98)
MONOCYTES # BLD AUTO: 0.56 THOUSAND/ÂΜL (ref 0.17–1.22)
MONOCYTES NFR BLD AUTO: 11 % (ref 4–12)
NEUTROPHILS # BLD AUTO: 3.31 THOUSANDS/ÂΜL (ref 1.85–7.62)
NEUTS SEG NFR BLD AUTO: 63 % (ref 43–75)
NONHDLC SERPL-MCNC: 140 MG/DL
NRBC BLD AUTO-RTO: 0 /100 WBCS
PLATELET # BLD AUTO: 192 THOUSANDS/UL (ref 149–390)
PMV BLD AUTO: 9.7 FL (ref 8.9–12.7)
POTASSIUM SERPL-SCNC: 3.7 MMOL/L (ref 3.5–5.3)
RBC # BLD AUTO: 5.18 MILLION/UL (ref 3.81–5.12)
SODIUM SERPL-SCNC: 140 MMOL/L (ref 135–147)
TRIGL SERPL-MCNC: 119 MG/DL
TSH SERPL DL<=0.05 MIU/L-ACNC: 2.11 UIU/ML (ref 0.45–4.5)
WBC # BLD AUTO: 5.24 THOUSAND/UL (ref 4.31–10.16)

## 2023-11-27 PROCEDURE — 92610 EVALUATE SWALLOWING FUNCTION: CPT

## 2023-11-27 PROCEDURE — 80048 BASIC METABOLIC PNL TOTAL CA: CPT | Performed by: INTERNAL MEDICINE

## 2023-11-27 PROCEDURE — 82948 REAGENT STRIP/BLOOD GLUCOSE: CPT

## 2023-11-27 PROCEDURE — 80061 LIPID PANEL: CPT | Performed by: INTERNAL MEDICINE

## 2023-11-27 PROCEDURE — 84443 ASSAY THYROID STIM HORMONE: CPT | Performed by: INTERNAL MEDICINE

## 2023-11-27 PROCEDURE — 85025 COMPLETE CBC W/AUTO DIFF WBC: CPT | Performed by: INTERNAL MEDICINE

## 2023-11-27 PROCEDURE — 99239 HOSP IP/OBS DSCHRG MGMT >30: CPT | Performed by: STUDENT IN AN ORGANIZED HEALTH CARE EDUCATION/TRAINING PROGRAM

## 2023-11-27 PROCEDURE — 83735 ASSAY OF MAGNESIUM: CPT | Performed by: INTERNAL MEDICINE

## 2023-11-27 PROCEDURE — 36415 COLL VENOUS BLD VENIPUNCTURE: CPT | Performed by: INTERNAL MEDICINE

## 2023-11-27 PROCEDURE — 99214 OFFICE O/P EST MOD 30 MIN: CPT | Performed by: PSYCHIATRY & NEUROLOGY

## 2023-11-27 RX ORDER — ASPIRIN 81 MG/1
81 TABLET, CHEWABLE ORAL DAILY
Qty: 30 TABLET | Refills: 0 | Status: SHIPPED | OUTPATIENT
Start: 2023-11-28 | End: 2023-11-27 | Stop reason: SDUPTHER

## 2023-11-27 RX ORDER — ASPIRIN 81 MG/1
81 TABLET, CHEWABLE ORAL DAILY
Qty: 30 TABLET | Refills: 0 | Status: SHIPPED | OUTPATIENT
Start: 2023-11-28

## 2023-11-27 RX ORDER — METOPROLOL SUCCINATE 50 MG/1
50 TABLET, EXTENDED RELEASE ORAL DAILY
Status: DISCONTINUED | OUTPATIENT
Start: 2023-11-27 | End: 2023-11-27 | Stop reason: HOSPADM

## 2023-11-27 RX ORDER — MECLIZINE HYDROCHLORIDE 25 MG/1
12.5 TABLET ORAL 3 TIMES DAILY PRN
Qty: 30 TABLET | Refills: 0 | Status: SHIPPED | OUTPATIENT
Start: 2023-11-27

## 2023-11-27 RX ORDER — ATORVASTATIN CALCIUM 40 MG/1
40 TABLET, FILM COATED ORAL
Qty: 30 TABLET | Refills: 0 | Status: SHIPPED | OUTPATIENT
Start: 2023-11-27 | End: 2023-12-27

## 2023-11-27 RX ORDER — DILTIAZEM HYDROCHLORIDE 240 MG/1
240 CAPSULE, COATED, EXTENDED RELEASE ORAL DAILY
Status: DISCONTINUED | OUTPATIENT
Start: 2023-11-27 | End: 2023-11-27 | Stop reason: HOSPADM

## 2023-11-27 RX ORDER — LISINOPRIL 20 MG/1
20 TABLET ORAL DAILY
Status: DISCONTINUED | OUTPATIENT
Start: 2023-11-27 | End: 2023-11-27 | Stop reason: HOSPADM

## 2023-11-27 RX ORDER — MECLIZINE HYDROCHLORIDE 25 MG/1
12.5 TABLET ORAL 3 TIMES DAILY PRN
Qty: 30 TABLET | Refills: 0 | Status: SHIPPED | OUTPATIENT
Start: 2023-11-27 | End: 2023-11-27 | Stop reason: SDUPTHER

## 2023-11-27 RX ORDER — ATORVASTATIN CALCIUM 40 MG/1
40 TABLET, FILM COATED ORAL
Qty: 30 TABLET | Refills: 0 | Status: SHIPPED | OUTPATIENT
Start: 2023-11-27 | End: 2023-11-27 | Stop reason: SDUPTHER

## 2023-11-27 RX ADMIN — ASPIRIN 81 MG CHEWABLE TABLET 81 MG: 81 TABLET CHEWABLE at 08:33

## 2023-11-27 RX ADMIN — HEPARIN SODIUM 5000 UNITS: 5000 INJECTION INTRAVENOUS; SUBCUTANEOUS at 06:20

## 2023-11-27 RX ADMIN — METOPROLOL SUCCINATE 50 MG: 50 TABLET, EXTENDED RELEASE ORAL at 12:16

## 2023-11-27 RX ADMIN — DILTIAZEM HYDROCHLORIDE 240 MG: 240 CAPSULE, EXTENDED RELEASE ORAL at 12:16

## 2023-11-27 RX ADMIN — INSULIN LISPRO 1 UNITS: 100 INJECTION, SOLUTION INTRAVENOUS; SUBCUTANEOUS at 11:26

## 2023-11-27 RX ADMIN — LISINOPRIL 20 MG: 20 TABLET ORAL at 12:16

## 2023-11-27 NOTE — PROGRESS NOTES
Progress Note - Neurology   Fadi Baca 79 y.o. female MRN: [id number]  Unit/Bed#: ED 06 Encounter: [id number]      Assessment/Plan   * Dizziness  Assessment & Plan  79year old female with afib on Xarelto, HTN, HLD, DM type 2, and chronic pain presented to the ED on 11/25 as stroke alert for acute onset room spinning dizziness, beginning while sitting in a chair, and accompanied by nausea/vomiting, gait imbalance, and mild horizontally stacked double vision. Symptoms lasted about 3 hours. Initial BP was 211/105, but peaked to 240/100. NIHSS 0. MRI brain yesterday negative for acute intracranial pathology/infarct; at this time, suspicious for vestibulopathy/inner ear cause of symptoms, versus posterior circulation TIA due to significant atherosclerotic disease (bilateral vertebral arteries, basilar artery). Neuroimaging thus-far:  -CTA head/neck:   -Severe left vertebral artery origin stenosis    -Moderate to high-grade right intracranial vert stenosis   -High-grade/near occlusive intracranial left vert stenosis   -Moderate to severe mid basilar artery plaque stenosis  -MRI brain: No acute infarct/ischemia, moderate T2/FLAIR signal disease both periventricular as well as brainstem, 1 or 2 small foci of chronic microhemorrhage    Plan:  - Stroke pathway  Echo can be deferred at this time. Lipid panel: , HDL 49, ,   Hemoglobin A1c 7.0  Can resume patient's home Xarelto given negative MRI for stroke  Continue aspirin 81 mg daily on discharge given significant posterior circulation stenosis/atherosclerotic disease (feel triple therapy would create high risk of bleeding)  Continue Atorvastatin 40 mg on discharge  Beginning today can begin to normalize blood pressure, however given her significant posterior circulation stenosis would avoid future hypotension/hypoperfusion  Continue telemetry monitoring  Stroke education provided  PT/OT/ST  Frequent neuro checks.  Continue to monitor and notify neurology with any changes. - Medical management and supportive care per primary team. Correction of any metabolic or infectious disturbances. No further inpatient neurologic workup anticipated; would be ok from neurology standpoint for discharge when deemed ready from primary team/therapy standpoints. Discussed plan of care with attending neurologist.     Yumiko Bain will need follow up in in 6 weeks with neurovascular attending. She will not require outpatient neurological testing. Subjective:   Patient resting in bed, doing well from symptom standpoint; no further dizziness/gait instability (has been walking to/from the bathroom without issue. Feel generally weak just from laying around in bed since Saturday. Notes Saturday night, symptoms worsened with neck extension, improved with neck flexion. No history of similar symptoms; notes compliance with Xarelto. Vitals: Blood pressure 162/89, pulse 86, temperature 98 °F (36.7 °C), temperature source Oral, resp. rate 12, height 5' 5" (1.651 m), weight 87 kg (191 lb 12.8 oz), SpO2 95 %, not currently breastfeeding. ,Body mass index is 31.92 kg/m². Examined alongside Dr. Milly Gipson. Physical Exam:   Physical Exam  Constitutional:       Appearance: Normal appearance. HENT:      Head: Normocephalic and atraumatic. Eyes:      Extraocular Movements: Extraocular movements intact and EOM normal.      Conjunctiva/sclera: Conjunctivae normal.      Pupils: Pupils are equal, round, and reactive to light. Cardiovascular:      Rate and Rhythm: Normal rate. Pulmonary:      Effort: Pulmonary effort is normal.   Abdominal:      General: There is no distension. Musculoskeletal:         General: Normal range of motion. Cervical back: Normal range of motion and neck supple. Skin:     General: Skin is warm and dry. Neurological:      Mental Status: She is alert and oriented to person, place, and time.       Motor: Motor strength is normal.     Coordination: Finger-Nose-Finger Test and Heel to Shin Test normal.   Psychiatric:         Speech: Speech normal.        Neurologic Exam     Mental Status   Oriented to person, place, and time. Attention: normal. Concentration: normal.   Speech: speech is normal   Normal comprehension. Cranial Nerves     CN II   Visual fields full to confrontation. CN III, IV, VI   Pupils are equal, round, and reactive to light. Extraocular motions are normal.     CN V   Facial sensation intact. CN VII   Facial expression full, symmetric. CN VIII   CN VIII normal.     CN IX, X   CN IX normal.   CN X normal.     CN XI   CN XI normal.     CN XII   CN XII normal.     Motor Exam     Strength   Strength 5/5 throughout. Sensory Exam   Light touch normal.     Gait, Coordination, and Reflexes     Coordination   Finger to nose coordination: normal  Heel to shin coordination: normal    Tremor   Resting tremor: absent  Intention tremor: absent       Lab, Imaging and other studies:   MRI brain wo contrast   Final Result by Justin Fierro MD (11/26 9328)      No diffusion abnormality identified to suggest recent ischemia. Workstation performed: [id number]         X-ray chest 1 view portable   ED Interpretation by Nelson Brooks DO (11/25 2208)   No acute abnormalities as interpreted by me independently       Final Result by Ivon Dougherty MD (11/26 4908)      No acute cardiopulmonary disease. Workstation performed: [id number]         CTA stroke alert (head/neck)   Final Result by Flora Tinsley MD (11/25 2213)      Severe plaque stenosis origin of the left vertebral artery. Focal moderate to high-grade plaque stenosis right intracranial vertebral artery. Multifocal high-grade near occlusive plaque stenosis intracranial left vertebral artery. 5mm craniocaudal segment of moderate to severe plaque stenosis mid basilar artery.       I personally discussed this study with Dr. Julia Centeno on 11/25/2023 9:45 PM.                           Workstation performed: [id number]         CT stroke alert brain   Final Result by Audrey Rodriguez MD (11/25 2148)      No acute intracranial abnormality. Chronic microangiopathic changes. I personally discussed this study with Dr. Julia Centeno on 11/25/2023 9:45 PM.            Workstation performed: [id number]               CBC:   Results from last 7 days   Lab Units 11/27/23 0633 11/26/23  0551 11/25/23 2133   WBC Thousand/uL 5.24 7.40 7.50   RBC Million/uL 5.18* 5.13* 5.58*   HEMOGLOBIN g/dL 15.3 15.0 16.4*   HEMATOCRIT % 47.0* 46.4* 49.7*   MCV fL 91 90 89   PLATELETS Thousands/uL 192 207 226   , BMP/CMP:   Results from last 7 days   Lab Units 11/27/23  0633 11/26/23  0551 11/25/23 2133 11/22/23 0918   SODIUM mmol/L 140 139 139 143   POTASSIUM mmol/L 3.7 3.8 3.7 4.1   CHLORIDE mmol/L 107 105 104 104   CO2 mmol/L 26 26 24 29   BUN mg/dL 17 15 20 14   CREATININE mg/dL 0.67 0.61 0.72 0.73   CALCIUM mg/dL 8.5 9.2 9.8 9.5   AST U/L  --   --   --  12*   ALT U/L  --   --   --  12   ALK PHOS U/L  --   --   --  43   EGFR ml/min/1.73sq m 80 83 77 75   , Vitamin B12:   , HgBA1C:   Results from last 7 days   Lab Units 11/22/23 0918   HEMOGLOBIN A1C % 7.0*   , TSH:   Results from last 7 days   Lab Units 11/27/23 0633   TSH 3RD GENERATON uIU/mL 2. 113   , Coagulation:   Results from last 7 days   Lab Units 11/25/23 2133   INR  1.12   , Lipid Profile:   Results from last 7 days   Lab Units 11/27/23 0633   HDL mg/dL 37*   LDL CALC mg/dL 116*   TRIGLYCERIDES mg/dL 119     VTE Prophylaxis: Sequential compression device (Venodyne)  and Xarelto    Total time spent today 25 minutes. Discussed plan of care with patient and primary team: reviewed MRI brain, CTA head/neck, concern for BPPV vs. TIA, Xarelto/aspirin/statin, OP neurology follow up.

## 2023-11-27 NOTE — ASSESSMENT & PLAN NOTE
Lab Results   Component Value Date    HGBA1C 7.0 (H) 11/22/2023       Recent Labs     11/26/23  1558 11/26/23  2105 11/27/23  0749 11/27/23  1047   POCGLU 163* 193* 123 192*       Blood Sugar Average: Last 72 hrs:  (P) 161.2515152006129664    Holding home Jardiance  Lispro SSI AC while inpatient

## 2023-11-27 NOTE — ASSESSMENT & PLAN NOTE
On Cardizem, lisinopril, Toprol at home.   Holding antihypertensives to allow for permissive hypertension up to 220 / 110 mmHg per neurology  Labetalol as needed  Resumed home regimen

## 2023-11-27 NOTE — SPEECH THERAPY NOTE
Speech Language/Pathology    Speech-Language Pathology Bedside Swallow Evaluation      Patient Name: Rip MATOS Date: 11/27/2023     Problem List  Principal Problem:    Dizziness  Active Problems:    Hypertensive urgency    Type 2 diabetes mellitus with other specified complication (HCC)    MISHA (obstructive sleep apnea)    Persistent atrial fibrillation (HCC)    Cerebral atherosclerosis      Past Medical History  Past Medical History:   Diagnosis Date    A-fib ([address])     BMI 30.0-30.9,adult     Chronic pain     Coronary artery disease 2021    A Fib    Epistaxis     Fibroid     Hypertension     Lumbar spondylosis     Sleep apnea     CPAP    Type 2 DM        Past Surgical History  Past Surgical History:   Procedure Laterality Date    COLONOSCOPY  2020    no further    DILATION AND EVACUATION      DXA PROCEDURE (HISTORICAL)      MAMMO (HISTORICAL)      2018    TOTAL ABDOMINAL HYSTERECTOMY W/ BILATERAL SALPINGOOPHORECTOMY  1994    Fibroids - old records reviewed       Summary   Pt presented with functional appearing oral and pharyngeal stage swallowing skills with materials administered today. Timely and effective mastication of all material w/ A-P transfers. No oral residue. Swallows suspected fairly prompt. No overt s/s aspiration. Pt does endorse mild bolus sensation, improved w/ liquid wash - ?pharyngeal vs esophageal. Education provided on strategies to optimize swallow safety, including cutting material small, adding moisture, and alternating liquids/solids.     Risk/s for Aspiration: Min-mild      Recommended Diet: regular diet, thin liquids, and cut small and add moisture     Recommended Form of Meds: whole with liquid   Aspiration precautions and swallowing strategies: upright posture, only feed when fully alert, slow rate of feeding, and small bites/sips  Other Recommendations: Continue frequent oral care         Current Medical Status  Pt is a 79 y.o. female who presented to  [facility]  with  afib on Xarelto, HTN, HLD, DM type 2, and chronic pain presented to the ED on 11/25 as stroke alert for acute onset room spinning dizziness, beginning while sitting in a chair, and accompanied by nausea/vomiting, gait imbalance, and mild horizontally stacked double vision. Symptoms lasted about 3 hours. Current Precautions: Allergies:  No known food allergies    Past medical history:  Please see H&P for details    Special Studies:  MRI brain 11/26: No diffusion abnormality identified to suggest recent ischemia. CXR 11/25: No acute cardiopulmonary disease. CTA stroke alert 11/25: Severe plaque stenosis origin of the left vertebral artery. Focal moderate to high-grade plaque stenosis right intracranial vertebral artery. Multifocal high-grade near occlusive plaque stenosis intracranial left vertebral artery. 5mm craniocaudal segment of moderate to severe plaque stenosis mid basilar artery. Social/Education/Vocational Hx:  Pt lives  home     Swallow Information   Current Risks for Dysphagia & Aspiration:  age, stroke-like symptoms   Current Symptoms/Concerns:  globus sensation   Current Diet: regular diet and thin liquids   Baseline Diet: regular diet and thin liquids      Baseline Assessment   Behavior/Cognition: alert  Speech/Language Status: able to participate in conversation and able to follow commands  Patient Positioning: upright in bed  Pain Status/Interventions/Response to Interventions:  No report of or nonverbal indications of pain.        Swallow Mechanism Exam  Facial: symmetrical  Labial: WFL  Lingual: WFL  Velum: symmetrical  Mandible: adequate ROM  Dentition: adequate  Vocal quality:clear/adequate   Volitional Cough: strong/productive   Respiratory Status: on RA        Consistencies Assessed and Performance   Consistencies Administered: thin liquids, puree, soft solids, and hard solids    Oral Stage: WFL  Mastication was adequate with the materials administered today. Bolus formation and transfer were functional with no significant oral residue noted. No overt s/s reduced oral control. Pharyngeal Stage: WFL  Swallow Mechanics:  Swallowing initiation appeared prompt. Laryngeal rise was palpated and judged to be within functional limits. No coughing, throat clearing, change in vocal quality or respiratory status noted today. Esophageal Concerns: globus sensation    Strategies and Efficacy: liquid wash aids globus sensation     Summary and Recommendations (see above)    Results Reviewed with: patient and RN     Treatment Recommended: Not at this time, evaluation only. Please re-consult if medically necessary.

## 2023-11-27 NOTE — PHYSICAL THERAPY NOTE
Physical Therapy Screen    Patient Name: Yumiko Bain    IHAFL'E Date: 11/27/2023     Problem List  Principal Problem:    Dizziness  Active Problems:    Hypertensive urgency    Type 2 diabetes mellitus with other specified complication (HCC)    MISHA (obstructive sleep apnea)    Persistent atrial fibrillation (HCC)    Cerebral atherosclerosis       Past Medical History  Past Medical History:   Diagnosis Date    A-fib ([address])     BMI 30.0-30.9,adult     Chronic pain     Coronary artery disease 2021    A Fib    Epistaxis     Fibroid     Hypertension     Lumbar spondylosis     Sleep apnea     CPAP    Type 2 DM         Past Surgical History  Past Surgical History:   Procedure Laterality Date    COLONOSCOPY  2020    no further    DILATION AND EVACUATION      DXA PROCEDURE (HISTORICAL)      MAMMO (HISTORICAL)      2018    TOTAL ABDOMINAL HYSTERECTOMY W/ BILATERAL SALPINGOOPHORECTOMY  1994    Fibroids - old records reviewed           11/27/23 1019   PT Last Visit   PT Visit Date 11/27/23   Note Type   Note type Screen   Additional Comments Chart review completed. Patient documented as ambulating independently to the bathroom. Spoke with RN who reports that the patient is independent with no P.T. needs. Will discharge orders. Latonia Barragan

## 2023-11-27 NOTE — ASSESSMENT & PLAN NOTE
Holding home Xarelto per neurology  home diltiazem and Toprol held for permissive hypertension now resumed  Telemetry

## 2023-11-27 NOTE — DISCHARGE SUMMARY
[facility]  Discharge- Cordova Hash 1940, 79 y.o. female MRN: [id number]  Unit/Bed#: ED 06 Encounter: [id number]  Primary Care Provider: Dayo Chowdhury MD   Date and time admitted to hospital: 11/25/2023  9:14 PM    * Dizziness  Assessment & Plan  From prefer CVA versus TIA versus hypertensive urgency  Neurology consulted-stroke pathway with vitals and neurochecks  Hemoglobin A1c was recently obtained  Lipid panel ordered  CT head and CTA head and neck were reviewed. MRI brain ordered  Echocardiogram in August 2023 showed ejection fraction 65%  Telemetry  On aspirin and statin  MRI showing no acute pathology  Xarelto continue on discharge  Meclizine prn  PT/OT level 4 no needs    F/u with neuro outpt with vestibular therapy    Cerebral atherosclerosis  Assessment & Plan  Noted on CTA head and neck with severe stenosis in multiple areas  Statin from home was continued and daily aspirin started. Continue statin and ASA on discharge    Persistent atrial fibrillation ([address])  Assessment & Plan  Holding home Xarelto per neurology  home diltiazem and Toprol held for permissive hypertension now resumed  Telemetry    MISHA (obstructive sleep apnea)  Assessment & Plan  CPAP nightly    Type 2 diabetes mellitus with other specified complication [facility])  Assessment & Plan  Lab Results   Component Value Date    HGBA1C 7.0 (H) 11/22/2023       Recent Labs     11/26/23  1558 11/26/23  2105 11/27/23  0749 11/27/23  1047   POCGLU 163* 193* 123 192*       Blood Sugar Average: Last 72 hrs:  (P) 161.4233375115015898    Holding home Jardiance  Lispro SSI AC while inpatient      Hypertensive urgency  Assessment & Plan  On Cardizem, lisinopril, Toprol at home.   Holding antihypertensives to allow for permissive hypertension up to 220 / 110 mmHg per neurology  Labetalol as needed  Resumed home regimen        Medical Problems       Resolved Problems  Date Reviewed: 11/26/2023   None Discharging Physician / Practitioner: Lj Lakhani MD  PCP: Delfin Maddox MD  Admission Date:   Admission Orders (From admission, onward)       Ordered        11/25/23 2234  INPATIENT ADMISSION  Once                          Discharge Date: 11/27/23    Consultations During Hospital Stay:  Neuro  CM  PT  OT    Procedures Performed:   MRI brain wo contrast   Final Result      No diffusion abnormality identified to suggest recent ischemia. Workstation performed: [id number]         X-ray chest 1 view portable   ED Interpretation   No acute abnormalities as interpreted by me independently       Final Result      No acute cardiopulmonary disease. Workstation performed: [id number]         CTA stroke alert (head/neck)   Final Result      Severe plaque stenosis origin of the left vertebral artery. Focal moderate to high-grade plaque stenosis right intracranial vertebral artery. Multifocal high-grade near occlusive plaque stenosis intracranial left vertebral artery. 5mm craniocaudal segment of moderate to severe plaque stenosis mid basilar artery. I personally discussed this study with Dr. Dejuan Callahan on 11/25/2023 9:45 PM.                           Workstation performed: [id number]         CT stroke alert brain   Final Result      No acute intracranial abnormality. Chronic microangiopathic changes. I personally discussed this study with Dr. Dejuan Callahan on 11/25/2023 9:45 PM.            Workstation performed: [id number]               Significant Findings / Test Results:   See above    Incidental Findings:   See above   I reviewed the above mentioned incidental findings with the patient and/or family and they expressed understanding. Test Results Pending at Discharge (will require follow up):   none     Outpatient Tests Requested:  none    Complications:  none known at this time    Reason for Admission: dizziness    Hospital Course:    Arian Calderon is a 79 y.o. female patient who originally presented to the hospital on 11/25/2023 due to dizziness that had been ongoing for a couple of hours. Patient had hypertensive urgency on admission. Stroke pathway was initiated. Patient was admitted. Permissive hypertension was required and oral home meds were held. Vertigo symptoms improved. PT OT evaluated the patient and recommended level 4 care. Imaging showing no acute pathology. Neuro recommending continuation of Xarelto better blood pressure control addition of aspirin high intensity statin and meclizine as needed. She is otherwise remained hemodynamically stable afebrile no acute respiratory distress. She has been medically cleared for discharge by internal medicine and neurology. She is to follow-up with her PCP and neurology. She will be discharged with aspirin 81 mg to be taken once a day, Lipitor 40 daily and meclizine as needed. She will be discharged with outpatient vestibular therapy. Please see above list of diagnoses and related plan for additional information. Condition at Discharge: stable    Discharge Day Visit / Exam:   Subjective:  Rachel King was seen and examined at bedside. No acute events overnight. Discussed plan of care. All questions and concerns were answered and addressed. Dizziness has improved. Neurology recommending continuation of Xarelto and adding aspirin. Continuing high intensity statin on discharge. Along with meclizine as needed for dizziness symptoms. Also recommending vestibular therapy outpatient. Along with follow-up outpatient. Vitals: Blood Pressure: (!) 181/83 (11/27/23 1100)  Pulse: 102 (11/27/23 1100)  Temperature: 98 °F (36.7 °C) (11/25/23 2111)  Temp Source: Oral (11/25/23 2111)  Respirations: 17 (11/27/23 1100)  Height: 5' 5" (165.1 cm) (11/25/23 2111)  Weight - Scale: 87 kg (191 lb 12.8 oz) (11/25/23 2147)  SpO2: 95 % (11/27/23 1100)  Exam:   Physical Exam  Vitals and nursing note reviewed.    Constitutional: General: She is not in acute distress. Appearance: She is obese. She is not ill-appearing. HENT:      Head: Normocephalic and atraumatic. Cardiovascular:      Rate and Rhythm: Normal rate and regular rhythm. Pulses: Normal pulses. Heart sounds: Normal heart sounds. Pulmonary:      Effort: Pulmonary effort is normal.      Breath sounds: Normal breath sounds. Abdominal:      General: Abdomen is flat. Bowel sounds are normal.      Palpations: Abdomen is soft. Musculoskeletal:      Right lower leg: No edema. Left lower leg: No edema. Skin:     General: Skin is warm. Neurological:      General: No focal deficit present. Mental Status: She is alert and oriented to person, place, and time. Discharge instructions/Information to patient and family:   See after visit summary for information provided to patient and family. Provisions for Follow-Up Care:  See after visit summary for information related to follow-up care and any pertinent home health orders. Mobility at time of Discharge:   Basic Mobility Inpatient Raw Score: 24  JH-HLM Goal: 8: Walk 250 feet or more  JH-HLM Achieved: 7: Walk 25 feet or more  HLM Goal NOT achieved. Continue to encourage mobility in post discharge setting. Disposition:   Home    Planned Readmission: no     Discharge Statement:  I spent 35 minutes discharging the patient. This time was spent on the day of discharge. I had direct contact with the patient on the day of discharge. Greater than 50% of the total time was spent examining patient, answering all patient questions, arranging and discussing plan of care with patient as well as directly providing post-discharge instructions. Additional time then spent on discharge activities. Discharge Medications:  See after visit summary for reconciled discharge medications provided to patient and/or family.       **Please Note: This note may have been constructed using a voice recognition system**

## 2023-11-27 NOTE — UTILIZATION REVIEW
Initial Clinical Review  Date: 11/27/23     Day 3: Has surpassed a 2nd midnight with active treatments and services, which include telemetry  neuro checks, asa and statin, accuchecks with SSI. Neurology on consult. .     Admission: Date/Time/Statement:   Admission Orders (From admission, onward)       Ordered        11/25/23 2234  INPATIENT ADMISSION  Once                          Orders Placed This Encounter   Procedures    INPATIENT ADMISSION     Standing Status:   Standing     Number of Occurrences:   1     Order Specific Question:   Level of Care     Answer:   Med Surg [16]     Order Specific Question:   Estimated length of stay     Answer:   More than 2 Midnights     Order Specific Question:   Certification     Answer:   I certify that inpatient services are medically necessary for this patient for a duration of greater than two midnights. See H&P and MD Progress Notes for additional information about the patient's course of treatment. ED Arrival Information       Expected   -    Arrival   11/25/2023 20:56    Acuity   Urgent              Means of arrival   Walk-In    Escorted by   Family Member    Service   Hospitalist    Admission type   Emergency              Arrival complaint   hypertension, dizzy, vomiting             Chief Complaint   Patient presents with    Hypertension     Started today with dizziness, nausea, HTN. Vomited x1. BP at home was 176/117       Initial Presentation: 79 y.o. female from home to ED admitted inpatient due to Dizziness/Cerebral atherosclerosis. PMH of A-fib on Xarelto, CAD, obesity, hypertension, MISHA, type 2 diabetes not on insulin. Presented due to sudden dizziness with room spinning sensation then episode of nausea and vomiting starting less than 30 minutes prior to arrival.  On exam: hypertensive. NIHSS 0.   Glucose 171. Cta head showed severe plaque stenosis of left vertebral artery.    Multifocal high-grade near occlusive plaque stenosis intracranial left vertebral artery. 5mm craniocaudal segment of moderate to severe plaque stenosis mid basilar artery. In the ED discussed with neurology. No TNK. Loaded with 325 mg asa. No endovascular intervention. Plan is telemetry. Lipid panel. MRI brain. Neuro checks. Continue asa. Hold Xarelto. Permissive hypertension, hold home diltiazem and Toprol. Hold Jardiance. Start SSI.     11/25/23 per neurology- Patient with spontaneous acute vestibular syndrome with associated nausea and vomiting. Stroke alert. On Xarelto. Plan is rule out stroke:   permissive hypertension with BP < 220/110. Hold Xarelto. Load with asa then asa daily. No DAPT due to risk of bleeding as took Eliquis 3.5 hours ago. MRI brain. Date: 11/26/23    Day 2: dizziness improved. Exam neuro no focal deficits. Continue neuro checks. Home statin. Continue asa. Xarelto on hold. Telemetry. Per neurology 11/26- back to baseline. On exam: anisocoria. Suspect "posterior circulation TIA or stroke. Etiology atheroembolic secondary to significant posterior circulation atherosclerotic disease"   hold Xarelto until MRI done. Continue statin and asa.       ED Triage Vitals [11/25/23 2111]   Temperature Pulse Respirations Blood Pressure SpO2   98 °F (36.7 °C) 81 18 (!) 211/105 98 %      Temp Source Heart Rate Source Patient Position - Orthostatic VS BP Location FiO2 (%)   Oral Monitor Sitting Left arm --      Pain Score       No Pain          Wt Readings from Last 1 Encounters:   11/25/23 87 kg (191 lb 12.8 oz)     Additional Vital Signs:   11/27/23 0900 -- 97 12 163/91 121 -- -- -- Lying   11/27/23 0845 -- 115 Abnormal  39 Abnormal  -- -- 97 % -- -- --   11/27/23 0830 -- 87 15 -- -- 95 % -- -- --   11/27/23 0815 -- 96 19 -- -- 95 % -- -- --   11/27/23 0800 -- 101 16 163/98 124 96 % None (Room air) -- Lying   11/27/23 0745 -- 84 12 -- -- 93 % -- -- --   11/27/23 0600 -- 86 12 162/89 117 95 % None (Room air) -- Lying   11/27/23 0400 -- 88 12 143/87 109 92 % None (Room air) -- Lying   11/27/23 0300 -- 102 22 -- -- 92 % None (Room air) -- --   11/27/23 0200 -- 95 14 135/65 91 91 % None (Room air) -- Lying   11/27/23 0100 -- 94 12 144/73 103 91 % None (Room air) -- Lying   11/26/23 2323 -- -- -- -- -- 93 % None (Room air) Face mask --   11/26/23 2300 -- 88 13 163/83 116 92 % None (Room air) -- Lying   11/26/23 1900 -- 91 16 166/82 118 97 % None (Room air) -- Sitting   11/26/23 1800 -- 87 16 168/93 122 97 % None (Room air) -- Sitting   11/26/23 1700 -- 80 16 176/96 Abnormal  127 97 % None (Room air) -- Sitting   11/26/23 1600 -- 80 14 171/85 Abnormal  119 98 % None (Room air) -- Sitting   11/26/23 1500 -- 81 14 162/76 109 97 % None (Room air) -- Lying   11/26/23 1400 -- 80 14 152/67 96 95 % None (Room air) -- Lying   11/26/23 1315 -- 87 14 186/104 Abnormal  127 98 % None (Room air) -- Lying   11/26/23 0945 -- 77 16 146/72 102 98 % None (Room air) -- Lying   11/26/23 0745 -- 70 16 163/82 114 99 % None (Room air) -- Lying   11/26/23 0600 -- 73 16 179/83 Abnormal  -- 100 % None (Room air) -- Lying   11/26/23 0500 -- 64 14 163/79 -- 96 % None (Room air) -- Lying   11/26/23 0400 -- 70 16 168/83 -- 95 % None (Room air) -- Lying   11/26/23 0300 -- 80 14 172/94 Abnormal  -- 95 %  None (Room air) Face mask Lying   11/26/23 0200 -- 77 16 180/100 Abnormal  -- 94 % None (Room air) -- Lying   11/26/23 0100 -- 81 14 162/95 -- 97 % None (Room air) -- Lying   11/25/23 2300 -- 82 16 177/81 Abnormal  116 99 % None (Room air) -- Sitting   11/25/23 2200 -- 88 17 212/86 Abnormal  124 99 % None (Room air) --        Pertinent Labs/Diagnostic Test Results:   MRI brain wo contrast   Final Result by Santa Reyes MD (11/26 1758)      No diffusion abnormality identified to suggest recent ischemia.       Workstation performed: [id number]         X-ray chest 1 view portable   ED Interpretation by Yolie Mobley DO (11/25 2208)   No acute abnormalities as interpreted by me independently       Final Result by Karissa Salvador MD (11/26 1058)      No acute cardiopulmonary disease. Workstation performed: [id number]         CTA stroke alert (head/neck)   Final Result by Chantelle Diaz MD (11/25 2213)      Severe plaque stenosis origin of the left vertebral artery. Focal moderate to high-grade plaque stenosis right intracranial vertebral artery. Multifocal high-grade near occlusive plaque stenosis intracranial left vertebral artery. 5mm craniocaudal segment of moderate to severe plaque stenosis mid basilar artery. I personally discussed this study with Dr. Yadira Cooney on 11/25/2023 9:45 PM.                           Workstation performed: [id number]         CT stroke alert brain   Final Result by Chantelle Diaz MD (11/25 2148)      No acute intracranial abnormality. Chronic microangiopathic changes.          I personally discussed this study with Dr. Yadira Cooney on 11/25/2023 9:45 PM.            Workstation performed: [id number]           Results from last 7 days   Lab Units 11/25/23 2150   SARS-COV-2  Negative     Results from last 7 days   Lab Units 11/27/23  0633 11/26/23  0551 11/25/23 2133   WBC Thousand/uL 5.24 7.40 7.50   HEMOGLOBIN g/dL 15.3 15.0 16.4*   HEMATOCRIT % 47.0* 46.4* 49.7*   PLATELETS Thousands/uL 192 207 226   NEUTROS ABS Thousands/µL 3.31  --   --          Results from last 7 days   Lab Units 11/27/23  0633 11/26/23  0551 11/25/23 2133 11/22/23  0918   SODIUM mmol/L 140 139 139 143   POTASSIUM mmol/L 3.7 3.8 3.7 4.1   CHLORIDE mmol/L 107 105 104 104   CO2 mmol/L 26 26 24 29   ANION GAP mmol/L 7 8 11 10   BUN mg/dL 17 15 20 14   CREATININE mg/dL 0.67 0.61 0.72 0.73   EGFR ml/min/1.73sq m 80 83 77 75   CALCIUM mg/dL 8.5 9.2 9.8 9.5   MAGNESIUM mg/dL 1.9  --   --   --      Results from last 7 days   Lab Units 11/22/23  0918   AST U/L 12*   ALT U/L 12   ALK PHOS U/L 43   TOTAL PROTEIN g/dL 6.6   ALBUMIN g/dL 4.1   TOTAL BILIRUBIN mg/dL 0.69 Results from last 7 days   Lab Units 11/27/23  0749 11/26/23  2105 11/26/23  1558 11/26/23  1138 11/26/23  0848 11/25/23  2140   POC GLUCOSE mg/dl 123 193* 163* 196* 89 172*     Results from last 7 days   Lab Units 11/27/23  0633 11/26/23  0551 11/25/23  2133   GLUCOSE RANDOM mg/dL 139 124 171*     Results from last 7 days   Lab Units 11/22/23  0918   HEMOGLOBIN A1C % 7.0*   EAG mg/dl 154     Results from last 7 days   Lab Units 11/25/23  2133   HS TNI 0HR ng/L 3     Results from last 7 days   Lab Units 11/25/23  2133   PROTIME seconds 14.8*   INR  1.12   PTT seconds 28     Results from last 7 days   Lab Units 11/27/23  0633   TSH 3RD GENERATON uIU/mL 2. 113     Results from last 7 days   Lab Units 11/25/23  2133   LIPASE u/L 26     Results from last 7 days   Lab Units 11/25/23  2150   INFLUENZA A PCR  Negative   INFLUENZA B PCR  Negative   RSV PCR  Negative         ED Treatment:   Medication Administration from 11/25/2023 2055 to 11/27/2023 1003         Date/Time Order Dose Route Action Comments     11/25/2023 2210 EST ondansetron (ZOFRAN) injection 4 mg 4 mg Intravenous Given --     11/25/2023 2232 EST aspirin tablet 325 mg 325 mg Oral Given --     11/27/2023 3598 EST aspirin chewable tablet 81 mg 81 mg Oral Given --     11/26/2023 0847 EST aspirin chewable tablet 81 mg 81 mg Oral Given --     11/27/2023 0620 EST heparin (porcine) subcutaneous injection 5,000 Units 5,000 Units Subcutaneous Given --     11/26/2023 2112 EST heparin (porcine) subcutaneous injection 5,000 Units 5,000 Units Subcutaneous Given --     11/26/2023 1427 EST heparin (porcine) subcutaneous injection 5,000 Units 5,000 Units Subcutaneous Given --     11/26/2023 0559 EST heparin (porcine) subcutaneous injection 5,000 Units 5,000 Units Subcutaneous Given --     11/26/2023 1607 EST insulin lispro (HumaLOG) 100 units/mL subcutaneous injection 1-5 Units 1 Units Subcutaneous Given --     11/26/2023 1607 EST atorvastatin (LIPITOR) tablet 40 mg 40 mg Oral Given --          Past Medical History:   Diagnosis Date    A-fib ([address])     BMI 30.0-30.9,adult     Chronic pain     Coronary artery disease 2021    A Fib    Epistaxis     Fibroid     Hypertension     Lumbar spondylosis     Sleep apnea     CPAP    Type 2 DM      Present on Admission:   Type 2 diabetes mellitus with other specified complication (HCC)   Persistent atrial fibrillation (HCC)   MISHA (obstructive sleep apnea)   Hypertensive urgency      Admitting Diagnosis: Hypertension [I10]  Age/Sex: 79 y.o. female  Admission Orders: 1/25/23 2234 inpatient   Scheduled Medications:  aspirin, 81 mg, Oral, Daily  atorvastatin, 40 mg, Oral, Daily With Dinner  heparin (porcine), 5,000 Units, Subcutaneous, Q8H [address]  insulin lispro, 1-5 Units, Subcutaneous, TID AC      Continuous IV Infusions:     PRN Meds: not used. acetaminophen, 650 mg, Oral, Q6H PRN  glycerin-hypromellose-, 1 drop, Both Eyes, Q4H PRN  labetalol, 10 mg, Intravenous, Q6H PRN    Neuro checks Every 1 hour x 4 hours, then every 2 hours x 8 hours, then every 4 hours x 72 hours   Cpap at bedtime  PT/OT    IP CONSULT TO NEUROLOGY      Network Utilization Review Department  ATTENTION: Please call with any questions or concerns to [phone] and carefully listen to the prompts so that you are directed to the right person. All voicemails are confidential.   For Discharge needs, contact Care Management DC Support Team at [phone] opt. 2  Send all requests for admission clinical reviews, approved or denied determinations and any other requests to dedicated fax number below belonging to the campus where the patient is receiving treatment.  List of dedicated fax numbers for the Facilities:  [place] DENIALS (Administrative/Medical Necessity) [phone]   DISCHARGE SUPPORT TEAM (NETWORK) [address]es (Maternity/NICU/Pediatrics) [phone]   [address] [place] [address] Drive [phone] [facility] 1000 [facility] [phone] [address] [facility] [address] [address] [facility] [address] [place] [address] [address] [phone]

## 2023-11-27 NOTE — ASSESSMENT & PLAN NOTE
From prefer CVA versus TIA versus hypertensive urgency  Neurology consulted-stroke pathway with vitals and neurochecks  Hemoglobin A1c was recently obtained  Lipid panel ordered  CT head and CTA head and neck were reviewed.   MRI brain ordered  Echocardiogram in August 2023 showed ejection fraction 65%  Telemetry  On aspirin and statin  MRI showing no acute pathology  Xarelto continue on discharge  Meclizine prn  PT/OT level 4 no needs    F/u with neuro outpt with vestibular therapy

## 2023-11-27 NOTE — RESPIRATORY THERAPY NOTE
PT checked and refusing HS CPAP, SATS are stable and resting comfortably on RA. Nurse was notified that patient was checked by respiratory and not placed on CPAP. Nightly check task discontinued and pt refusal task added.

## 2023-11-27 NOTE — DISCHARGE INSTR - AVS FIRST PAGE
You are to follow-up with your PCP and neurology    You will be discharged with 81 mg of aspirin to be taken daily    You will be prescribed Lipitor 40 mg to be taken daily with dinner    You will have meclizine 12.5 mg to be taken as needed with dizziness symptoms    You will have a referral for physical therapy/Occupational Therapy for vestibular therapy for dizziness symptoms.

## 2023-11-27 NOTE — OCCUPATIONAL THERAPY NOTE
Occupational Therapy Screen    Patient Name: Patricia Carrion  JSEOP'Q Date: 11/27/2023 11/27/23 1025   OT Last Visit   OT Visit Date 11/27/23   Note Type   Note type Screen   Additional Comments OT orders received and chart reviewed. Spoke to RN who states pt is currently independent/back to baseline. Recommend pt continue to be OOB for meals, ambulation to/from BR, perform self care tasks, and mobility in hallway with nursing. At this time, OT recommendations at time of discharge are return home at prior level of function. No acute OT needs identified at this time; please re-consult if OT needs arise during remainder of hospital stay.      [employer], MS, OTR/L

## 2023-11-27 NOTE — ASSESSMENT & PLAN NOTE
Noted on CTA head and neck with severe stenosis in multiple areas  Statin from home was continued and daily aspirin started.   Continue statin and ASA on discharge

## 2023-11-28 RX ORDER — METOPROLOL SUCCINATE 50 MG/1
TABLET, EXTENDED RELEASE ORAL
Qty: 90 TABLET | Refills: 3 | Status: SHIPPED | OUTPATIENT
Start: 2023-11-28

## 2023-11-28 RX ORDER — DILTIAZEM HYDROCHLORIDE 240 MG/1
CAPSULE, COATED, EXTENDED RELEASE ORAL
Qty: 90 CAPSULE | Refills: 3 | Status: SHIPPED | OUTPATIENT
Start: 2023-11-28

## 2023-11-29 NOTE — UTILIZATION REVIEW
NOTIFICATION OF ADMISSION DISCHARGE   This is a Notification of Discharge from [address] [facility]. Please be advised that this patient has been discharge from our facility. Below you will find the admission and discharge date and time including the patient’s disposition. UTILIZATION REVIEW CONTACT:  Vidal Culver  Utilization   Network Utilization Review Department  Phone: [phone] carefully listen to the prompts. All voicemails are confidential.  Email: [email or web address]     ADMISSION INFORMATION  PRESENTATION DATE: 11/25/2023  9:14 PM  OBERVATION ADMISSION DATE:   INPATIENT ADMISSION DATE: 11/25/23 10:34 PM   DISCHARGE DATE: 11/27/2023 12:55 PM   DISPOSITION:Home/Self Care    Network Utilization Review Department  ATTENTION: Please call with any questions or concerns to [phone] and carefully listen to the prompts so that you are directed to the right person. All voicemails are confidential.   For Discharge needs, contact Care Management DC Support Team at [phone] opt. 2  Send all requests for admission clinical reviews, approved or denied determinations and any other requests to dedicated fax number below belonging to the campus where the patient is receiving treatment.  List of dedicated fax numbers for the Facilities:  [place] DENIALS (Administrative/Medical Necessity) [phone]   DISCHARGE SUPPORT TEAM (Network) [phone] 230[address] [facility] (Maternity/NICU/Pediatrics) [phone]   [address] [facility] [address] [phone] [address] [facility] [address] [phone]   [address] [place] [address]  [address] [phone]

## 2023-12-12 ENCOUNTER — TELEPHONE (OUTPATIENT)
Dept: NEUROLOGY | Facility: CLINIC | Age: 83
End: 2023-12-12

## 2023-12-12 NOTE — TELEPHONE ENCOUNTER
[facility]:    SLUB 11/25-11/2723 DIZZINESS/CEREBRAL ATHEROSCLEROSIS    Scheduled patient with Dr Breonna Nelson in CV office 2/8/24 @ 1pm    Did offer patient other office to see neurovascular attending patient did not want to travel further out. Per notes: Follow-up in the vascular neurology clinic in 4 to 6 weeks in the outpatient setting.   Attending preferred

## 2023-12-30 DIAGNOSIS — R04.0 EPISTAXIS: ICD-10-CM

## 2024-01-02 NOTE — TELEPHONE ENCOUNTER
Called, spoke to pt as there is no refill request in her chart from that date.    Pt states she had requested her refill from [facility] on the 15th, has not received it yet, and is out of medication.     Pt called her PCP to see if they had samples for her and was told they did not.    Pt has now been out of medication for nearly two weeks. Asked pt to come to [facility] to  samples so that she can restart today. She states she is unable but will stop at [place] office tomorrow for samples.

## 2024-02-08 ENCOUNTER — OFFICE VISIT (OUTPATIENT)
Dept: NEUROLOGY | Facility: CLINIC | Age: 84
End: 2024-02-08
Payer: COMMERCIAL

## 2024-02-08 VITALS
BODY MASS INDEX: 30.62 KG/M2 | TEMPERATURE: 97.6 F | HEART RATE: 78 BPM | OXYGEN SATURATION: 97 % | SYSTOLIC BLOOD PRESSURE: 136 MMHG | WEIGHT: 184 LBS | DIASTOLIC BLOOD PRESSURE: 71 MMHG

## 2024-02-08 DIAGNOSIS — E78.2 MIXED HYPERLIPIDEMIA: ICD-10-CM

## 2024-02-08 DIAGNOSIS — E66.9 OBESITY (BMI 30.0-34.9): ICD-10-CM

## 2024-02-08 DIAGNOSIS — G45.9 TRANSIENT ISCHEMIC ATTACK (TIA): Primary | ICD-10-CM

## 2024-02-08 DIAGNOSIS — I67.2 CEREBRAL ATHEROSCLEROSIS: ICD-10-CM

## 2024-02-08 DIAGNOSIS — I48.19 PERSISTENT ATRIAL FIBRILLATION (HCC): ICD-10-CM

## 2024-02-08 DIAGNOSIS — G47.33 OSA (OBSTRUCTIVE SLEEP APNEA): ICD-10-CM

## 2024-02-08 PROCEDURE — 99215 OFFICE O/P EST HI 40 MIN: CPT | Performed by: STUDENT IN AN ORGANIZED HEALTH CARE EDUCATION/TRAINING PROGRAM

## 2024-02-08 RX ORDER — LISINOPRIL 10 MG/1
TABLET ORAL
COMMUNITY
Start: 2023-11-20

## 2024-02-08 NOTE — PATIENT INSTRUCTIONS
You presented today in follow-up for a possible ischemic stroke, also known as a cerebrovascular accident.  This occurs when blood flow to 1 or more vessels is abruptly blocked, usually by a blood clot, leading to the area of the brain that receives blood from these vessels to die, leaving a permanent scar.  Once somebody has an ischemic stroke or transient ischemic attack (TIA), we have 2 primary goals going forward:    Help you recover from a symptomatic standpoint.  Unfortunately, the area of the brain that has been damaged does not truly heal; that scar will remain indefinitely.  However, our brains are often able to work around the area of injury to varying degrees, similar to if a sink-hole opens up in the highway on your way to work and you then have to utilize alternate routes (detours and back roads) to get where you need to go.  In order for this to occur, your brain needs to constantly practice and be forced to utilize these alternative pathways; this is where Physical Therapy, Occupational Therapy, and/or Speech Therapy are vital, and can help tremendously.  The greatest degree of recovery from the stroke is often seen within the first few weeks to months, however we can see improvement in symptoms up to a year after the stroke has occurred.  In your case specifically, this was likely a TIA and your symptoms have resolved, thus this is not needed.    Prevent it from happening again, to the best of our ability.    It is important to note that once you have had a stroke, your inherent risk of a future stroke does increase  In addition, while we can minimize this risk quite significantly, we can never truly eliminated  Nevertheless, adopting multiple strategies as outlined below can help reduce this risk as much as possible.  The most important factors are lifestyle ones:   Avoid or quit smoking, limit/moderate alcohol use, and keep your blood pressure, blood sugars/diabetes, and cholesterol under  "control  Recommend regular exercise to help with cardiovascular and cerebrovascular health  Recommend maintaining a healthy weight  Continue utilizing your CPAP to treat your MISHA.   In addition, we have started you on certain medications which also significantly reduce stroke risk (which are referred to as \"secondary stroke prevention,\" as the lifestyle modifications have an even larger impact).  Antithrombotic (or \"antiplatelet\") agents: These are medications that work on cells called platelets.  Platelets are part of 1 of 2 primary processes in the body that form blood clots, which when formed by platelets are referred to as \"thrombi.\"  Of the 2 types of blood clots formed in the body, strokes or more often caused by platelet thrombi.  In your case, this medication is aspirin 81 mg once daily  Anticoagulant agents (or \"blood thinners\"): These are medications that work on coagulation proteins.  These proteins are part of 1 of 2 primary processes in the body that form clots.  When formed by these coagulation proteins, these clots are referred to as \"emboli.\"  Of the 2 types of blood clots formed in the body, strokes are less commonly caused by emboli overall, except in specific cases such as when your body is more prone to clotting or you exhibit a specific heart arrhythmia termed atrial fibrillation, as we discussed today.  In your case, this medication is Xarelto (rivaroxaban) 20 mg daily.  This medication, and whether it ever needs to be switched to an alternate agent in the future, will be managed primarily by your cardiologist.  A high intensity statin medication: These are medications used to treat high cholesterol, however while this is 1 way in which they help reduce stroke risk, they have also been found to independently reduce the inflammatory process that leads to blood clot formation, thus they are also beneficial from a stroke reduction standpoint even in patients with low cholesterol, as discussed " today.  In your case, this medication is currently Lipitor, however feel free to try alternatives (Crestor, Pravachol)

## 2024-02-08 NOTE — PROGRESS NOTES
Review of Systems   Constitutional: Negative.    HENT: Negative.     Eyes: Negative.    Respiratory: Negative.     Cardiovascular: Negative.    Gastrointestinal: Negative.    Endocrine: Negative.    Genitourinary: Negative.    Musculoskeletal: Negative.    Skin: Negative.    Allergic/Immunologic: Negative.    Neurological:  Positive for numbness (feet and legs ocasionally).   Hematological: Negative.    Psychiatric/Behavioral: Negative.

## 2024-02-08 NOTE — PROGRESS NOTES
[facility]  Neurological Services Hospital Follow-up Note    Patient Name: Kelly Portillo  : 1940    MRN: [id number]      Assessment/Plan:    1. Transient ischemic attack (TIA)        2. MISHA (obstructive sleep apnea)        3. Persistent atrial fibrillation (HCC)        4. Cerebral atherosclerosis        5. Mixed hyperlipidemia        6. Obesity (BMI 30.0-34.9)          Kelly is a very pleasant 83 year old with PMH persistent atrial fibrillation on Xarelto, HTN, T2DM, MISHA on CPAP, obesity, HLD, chronic pain syndrome presenting for hospital follow-up after being seen by our team -2023.  Based on her description of the events and chart review, my suspicion is highest for a posterior circulation transient ischemic attack as the cause of her symptoms prior to her hospitalization, with a lower suspicion for a transient vestibular neuronitis or similar process.  I have a very low suspicion for BPPV, as I would have expected this to return, and the pattern does not quite fit this as well as the other 2 possibilities.  Given that vestibular neuronitis is typically short-lived and has a low chance of recurrence, and in light of her extensive atherosclerotic/cerebrovascular disease in the posterior circulation, I feel that approaching this as a posterior circulation TIA with appropriate stroke prevention strategies is necessary.    We had a long discussion regarding the pathophysiology of ischemic stroke, and the reasoning behind the medications that she is taking.  I reviewed important lifestyle factors that, when well-controlled, contribute to primary stroke prevention, including blood pressure, blood sugar, and blood cholesterol control.  I encouraged her to continue to utilize her CPAP for control of her MISHA.  From a secondary stroke prevention standpoint, I agree she should continue the Xarelto for the atrial fibrillation, as well as the Lipitor.  We did discuss that  given she is having some side effects to the Lipitor, she could discuss with her PCP possibly trialing alternative statin medications, such as Crestor or Pravachol  Regarding the aspirin: While data does not show a significant increased benefit for stroke reduction with antiplatelet plus anticoagulant therapy but does show an increased bleed risk, thus typically 1 1 is on anticoagulation antiplatelet is not needed, in her case specifically she has very extensive and fairly severe posterior circulation atherosclerotic disease.  Given this plus the fact that her symptoms sound very concerning for a posterior circulation TIA, and the potential catastrophic consequences of a posterior circulation infarct, I do feel that continuing her on aspirin 81 mg to prevent thrombotic clot formation in addition to the Xarelto is merited in her case.  We discussed that this could be adjusted in the future if needed should she develop significant side effects, however she has tolerated the combination quite well thus far.  I will plan to see her back in 6 months                                                                          Thank you for allowing me to participate in the care of your patient.  If there are any questions regarding evaluation please feel free to reach out.       ________________________________________________________________________________________________    Subjective:  Chief Complaint   Patient presents with    Hospital Follow-up       83 y.o. right - handed female with PMH persistent atrial fibrillation on Xarelto, HTN, T2DM, MISHA on CPAP, obesity, HLD, chronic pain syndrome presenting for hospital follow-up after being seen by our team 11/25-11/27/2023.    Per review of our inpatient team's notes:  Patient initially presented 11/25 as a stroke alert for acute onset room spinning dizziness, beginning while sitting in a chair, and accompanied by nausea/vomiting, gait imbalance, and mild horizontal  nystagmus double vision.  Symptoms lasted ~3 hours before resolving.  Initial blood pressure was 211/105, however peaked to 240/100.  NIHSS was 0.  Of note, MRI brain was negative for acute intracranial pathology/infarct.  Suspicion was for either vestibulopathy/inner ear cause of symptoms versus posterior circulation TIA (patient was noted to have significant atherosclerotic disease of the bilateral vertebral arteries and basilar artery).  Recommendations were to resume patient's home Xarelto, however continue aspirin 81 mg daily as well given the posterior circulation stenosis/atherosclerotic disease.  Continue atorvastatin 40 mg daily.    Hospital Neuroimaging:  -CTA head/neck:   -Severe left vertebral artery origin stenosis          -Moderate to high-grade right intracranial vert stenosis         -High-grade/near occlusive intracranial left vert stenosis         -Moderate to severe mid basilar artery plaque stenosis  -MRI brain: No acute infarct/ischemia, moderate T2/FLAIR signal disease both periventricular as well as brainstem, 1 or 2 small foci of chronic microhemorrhage      At today's visit, she tells me that she had turned her head right before it started, then the room spinning was very intense, she did vomit, had to hold only things to walk around. Because blood pressure was elevated, she went to the ED. Thinks the dizziness may have lessened or abated by the time she even got to the ED.     She denies any recurrence of her symptoms since, and denies new neurologic issues. She tells me she does not know why she was put on the Lipitor, and she states that she feels that Lipitor can mess with blood sugar (has been running 140s-150s; before this she could keep it in 110s). Also feels she is not sleeping as well. Thinks her legs feel heavier, and by the time she is done with her daily walk, her legs are more sore than they had been.     Has had very bad nosebleeds in the past. Not very often, but very bad  when happens.     Pertinent Medications: Aspirin 81 mg daily, Lipitor 40 mg nightly, Xarelto 20 mg daily, metoprolol 50 mg daily, Antivert 12.5 mg 3 times daily as needed    Current Outpatient Medications   Medication Sig Dispense Refill    aspirin 81 mg chewable tablet Chew 1 tablet (81 mg total) daily Do not start before November 28, 2023. 30 tablet 0    atorvastatin (LIPITOR) 40 mg tablet Take 1 tablet (40 mg total) by mouth daily with dinner 30 tablet 0    betamethasone dipropionate (DIPROSONE) 0.05 % cream Pea sized external application twice daily for 3 weeks, then daily for 3 weeks, then every other day. 30 g 2    Blood Glucose Monitoring Suppl (ONE TOUCH ULTRA 2) w/Device KIT use as directed by prescriber twice a day      Clobetasol Prop Emollient Base 0.05 % emollient cream 1 Application Every 12 hours      diltiazem (CARDIZEM CD) 240 mg 24 hr capsule TAKE 1 CAPSULE DAILY 90 capsule 3    Jardiance 10 MG TABS Take 10 mg by mouth daily      Lancets (OneTouch Delica Plus [id number]) MISC use as directed by prescriber twice a day      lisinopril (ZESTRIL) 10 mg tablet       metoprolol succinate (TOPROL-XL) 50 mg 24 hr tablet TAKE 1 TABLET DAILY 90 tablet 3    nystatin (MYCOSTATIN) cream Pea sized external application 1-2 times daily as needed. 30 g 3    OneTouch Ultra test strip use as directed by prescriber twice a day      rivaroxaban (Xarelto) 20 mg tablet Take 1 tablet (20 mg total) by mouth daily 90 tablet 2    lisinopril (ZESTRIL) 20 mg tablet Take 1 tablet (20 mg total) by mouth daily (Patient not taking: Reported on 2/8/2024) 90 tablet 3    meclizine (ANTIVERT) 25 mg tablet Take 0.5 tablets (12.5 mg total) by mouth 3 (three) times a day as needed for dizziness (Patient not taking: Reported on 2/8/2024) 30 tablet 0     No current facility-administered medications for this visit.       Allergies   Allergen Reactions    Codeine     Other Other (See Comments)    Sulfa Antibiotics        Past Medical History:    Diagnosis Date    A-fib (HCC)     BMI 30.0-30.9,adult     Chronic pain     Coronary artery disease 2021    A Fib    Epistaxis     Fibroid     Hypertension     Lumbar spondylosis     Sleep apnea     CPAP    Type 2 DM     :   Past Surgical History:   Procedure Laterality Date    COLONOSCOPY  2020    no further    DILATION AND EVACUATION      DXA PROCEDURE (HISTORICAL)      MAMMO (HISTORICAL)      2018    TOTAL ABDOMINAL HYSTERECTOMY W/ BILATERAL SALPINGOOPHORECTOMY  1994    Fibroids - old records reviewed     Social History     Socioeconomic History    Marital status:      Spouse name: Not on file    Number of children: Not on file    Years of education: Not on file    Highest education level: Not on file   Occupational History    Not on file   Tobacco Use    Smoking status: Never    Smokeless tobacco: Never   Vaping Use    Vaping status: Never Used   Substance and Sexual Activity    Alcohol use: Yes     Comment: socially    Drug use: No    Sexual activity: Not Currently     Partners: Male     Birth control/protection: Female Sterilization   Other Topics Concern    Not on file   Social History Narrative    Not on file     Social Determinants of Health     Financial Resource Strain: Not on file   Food Insecurity: Not on file   Transportation Needs: Not on file   Physical Activity: Not on file   Stress: Not on file   Social Connections: Not on file   Intimate Partner Violence: Not on file   Housing Stability: Not on file      Family History   Problem Relation Age of Onset    Diabetes Mother     Diabetes Father     Atrial fibrillation Father     Heart attack Father     Diabetes Paternal Grandmother         Daughter    Breast cancer Neg Hx     Uterine cancer Neg Hx     Ovarian cancer Neg Hx     Colon cancer Neg Hx        Review of Symptoms:      10-point system review completed, all of which are negative except as mentioned above.    Labs:  11/27/2023:  Lipid panel:   -Cholesterol 177 (224 11/25)  -LDL: 116 (149  11/25)  -HDL: 37 (49 11/25)    TSH: WNL  Magnesium: WNL      Imaging/Procedures:   CT angiogram head and neck 11/25/2023:  IMPRESSION:     Severe plaque stenosis origin of the left vertebral artery.     Focal moderate to high-grade plaque stenosis right intracranial vertebral artery.     Multifocal high-grade near occlusive plaque stenosis intracranial left vertebral artery.     5mm craniocaudal segment of moderate to severe plaque stenosis mid basilar artery      MRI brain without contrast 11/26/2023:  IMPRESSION:     No diffusion abnormality identified to suggest recent ischemia.      Objective:  Physical Exam:      /71 (BP Location: Left arm, Patient Position: Sitting, Cuff Size: Standard)   Pulse 78   Temp 97.6 °F (36.4 °C) (Temporal)   Wt 83.5 kg (184 lb)   SpO2 97%   BMI 30.62 kg/m²      Gen: A&O x 4, NAD, cooperative  HEENT: NC/AT, EOMI, PERRL, mmm, no carotid bruits, neck supple, no meningeal signs  Heart: RRR, S1S2  Lungs: CTAB  Abd: soft/NT/ND, +BS  Ext: no edema, no calf tenderness b/l  Endo: no thyromegaly  Psych: no depression or anxiety apparent   Skin: no rashes or lesions    Neurologic Exam:  Mental Status: A&O x 4, NAD, speech clear, language fluent, comprehension intact, repetition and naming intact    Cranial Nerve Exam:   CN II-XII intact: PERRL, VFF, no nystagmus, no gaze paresis, sensation V1-V3 intact b/l, muscles of facial expression symmetric; hearing intact to conversational tone, palate elevates symmetrically, shoulder elevation symmetric and tongue protrudes midline with movement side to side.    Motor Exam:       Strength 5/5 UE's/LE's b/l  Tone and bulk normal   No pronator drift    Deep Tendon Reflexes: 2/4 biceps, triceps, brachioradialis, patellar, and achilles b/l, flexor plantar responses b/l    Sensation: Intact light touch/temperature/pinprick/vibration UE's/LE's b/l    Coordination/Cerebellum:       Tremors--none      Rapidly alternating movements: no  dysdiadochokinesia b/l                Heel-to-Shin: no dysmetria b/l      Finger-to-Nose: no dysmetria b/l    Gait and stance:      Gait: Normal casual, tiptoe, heel walk, and tandem gait.  No evidence of ataxia present.            I have spent a total time of 45-50 minutes on 02/08/24 in caring for this patient including Diagnostic results, Prognosis, Risks and benefits of tx options, Instructions for management, Patient and family education, Importance of tx compliance, Risk factor reductions, Documenting in the medical record, Reviewing / ordering tests, medicine, procedures  , and Obtaining or reviewing history  .      Electronically signed by:    Denis Mathew DO  Board-Certified Neurology and Sleep Medicine  [facility]  02/08/24

## 2024-05-08 ENCOUNTER — APPOINTMENT (OUTPATIENT)
Dept: LAB | Facility: CLINIC | Age: 84
End: 2024-05-08
Payer: COMMERCIAL

## 2024-05-08 DIAGNOSIS — D68.59 OTHER PRIMARY THROMBOPHILIA (HCC): ICD-10-CM

## 2024-05-08 DIAGNOSIS — I48.91 ATRIAL FIBRILLATION, UNSPECIFIED TYPE (HCC): ICD-10-CM

## 2024-05-08 DIAGNOSIS — E11.69 TYPE 2 DIABETES MELLITUS WITH OTHER SPECIFIED COMPLICATION, UNSPECIFIED WHETHER LONG TERM INSULIN USE (HCC): ICD-10-CM

## 2024-05-08 DIAGNOSIS — E78.2 MIXED HYPERLIPIDEMIA: ICD-10-CM

## 2024-05-08 LAB
ALBUMIN SERPL BCP-MCNC: 3.9 G/DL (ref 3.5–5)
ALP SERPL-CCNC: 49 U/L (ref 34–104)
ALT SERPL W P-5'-P-CCNC: 19 U/L (ref 7–52)
ANION GAP SERPL CALCULATED.3IONS-SCNC: 10 MMOL/L (ref 4–13)
AST SERPL W P-5'-P-CCNC: 18 U/L (ref 13–39)
BILIRUB SERPL-MCNC: 0.78 MG/DL (ref 0.2–1)
BUN SERPL-MCNC: 11 MG/DL (ref 5–25)
CALCIUM SERPL-MCNC: 9.1 MG/DL (ref 8.4–10.2)
CHLORIDE SERPL-SCNC: 106 MMOL/L (ref 96–108)
CHOLEST SERPL-MCNC: 109 MG/DL
CO2 SERPL-SCNC: 26 MMOL/L (ref 21–32)
CREAT SERPL-MCNC: 0.71 MG/DL (ref 0.6–1.3)
EST. AVERAGE GLUCOSE BLD GHB EST-MCNC: 166 MG/DL
GFR SERPL CREATININE-BSD FRML MDRD: 78 ML/MIN/1.73SQ M
GLUCOSE P FAST SERPL-MCNC: 169 MG/DL (ref 65–99)
HBA1C MFR BLD: 7.4 %
HDLC SERPL-MCNC: 44 MG/DL
LDLC SERPL CALC-MCNC: 52 MG/DL (ref 0–100)
NONHDLC SERPL-MCNC: 65 MG/DL
POTASSIUM SERPL-SCNC: 4.3 MMOL/L (ref 3.5–5.3)
PROT SERPL-MCNC: 6.6 G/DL (ref 6.4–8.4)
SODIUM SERPL-SCNC: 142 MMOL/L (ref 135–147)
TRIGL SERPL-MCNC: 65 MG/DL

## 2024-05-08 PROCEDURE — 80061 LIPID PANEL: CPT

## 2024-05-08 PROCEDURE — 83036 HEMOGLOBIN GLYCOSYLATED A1C: CPT

## 2024-05-08 PROCEDURE — 80053 COMPREHEN METABOLIC PANEL: CPT

## 2024-05-08 PROCEDURE — 36415 COLL VENOUS BLD VENIPUNCTURE: CPT

## 2024-08-26 ENCOUNTER — OFFICE VISIT (OUTPATIENT)
Dept: CARDIOLOGY CLINIC | Facility: CLINIC | Age: 84
End: 2024-08-26
Payer: COMMERCIAL

## 2024-08-26 VITALS
DIASTOLIC BLOOD PRESSURE: 74 MMHG | WEIGHT: 188 LBS | SYSTOLIC BLOOD PRESSURE: 138 MMHG | HEIGHT: 64 IN | BODY MASS INDEX: 32.1 KG/M2 | HEART RATE: 90 BPM

## 2024-08-26 DIAGNOSIS — I10 PRIMARY HYPERTENSION: ICD-10-CM

## 2024-08-26 DIAGNOSIS — E78.2 MIXED HYPERLIPIDEMIA: ICD-10-CM

## 2024-08-26 DIAGNOSIS — G47.33 OSA (OBSTRUCTIVE SLEEP APNEA): ICD-10-CM

## 2024-08-26 DIAGNOSIS — E66.01 MORBID OBESITY (HCC): ICD-10-CM

## 2024-08-26 DIAGNOSIS — I48.19 PERSISTENT ATRIAL FIBRILLATION (HCC): Primary | ICD-10-CM

## 2024-08-26 DIAGNOSIS — I48.0 PAF (PAROXYSMAL ATRIAL FIBRILLATION) (HCC): ICD-10-CM

## 2024-08-26 PROCEDURE — 99214 OFFICE O/P EST MOD 30 MIN: CPT | Performed by: INTERNAL MEDICINE

## 2024-08-26 RX ORDER — DILTIAZEM HYDROCHLORIDE 240 MG/1
240 CAPSULE, COATED, EXTENDED RELEASE ORAL DAILY
Qty: 90 CAPSULE | Refills: 3 | Status: SHIPPED | OUTPATIENT
Start: 2024-08-26

## 2024-08-26 RX ORDER — METOPROLOL SUCCINATE 50 MG/1
50 TABLET, EXTENDED RELEASE ORAL 2 TIMES DAILY
Qty: 180 TABLET | Refills: 3 | Status: SHIPPED | OUTPATIENT
Start: 2024-08-26

## 2024-08-26 RX ORDER — SAXAGLIPTIN 2.5 MG/1
2.5 TABLET, FILM COATED ORAL DAILY
COMMUNITY
Start: 2024-06-12

## 2024-08-26 NOTE — PROGRESS NOTES
Cardiology Follow-up    Kelly Portillo  [id number]  1940  [facility] [place]  [address] [facility] [address] [facility]  [facility] [address] [phone]    1. Persistent atrial fibrillation (HCC)  metoprolol succinate (TOPROL-XL) 50 mg 24 hr tablet    diltiazem (CARDIZEM CD) 240 mg 24 hr capsule      2. Morbid obesity (HCC)        3. Primary hypertension        4. MISHA (obstructive sleep apnea)        5. Mixed hyperlipidemia        6. PAF (paroxysmal atrial fibrillation) (HCC)  rivaroxaban (Xarelto) 20 mg tablet            Discussion/Summary:    1.  Persistent atrial fibrillation - I have discussed with Kelly in the past both a rate and rhythm control strategy.  I stated that I would not recommend ablation, given her age, lack of symptoms and significant biatrial enlargement.  A cardioversion is an option however antiarrhythmic therapy would need to be restarted.  We discussed the risks and benefits of doing a rhythm control strategy versus continuing rate control strategy, especially given her intermittent symptoms of tachycardia that occur about twice per month.  For now we will increase her Toprol-XL to 50 mg twice daily, continue her diltiazem and follow her symptomatically.  She will continue Xarelto for stroke prevention.  We will see her back in 6 months.    2.  Hypertension - Her blood pressure is under good control with Toprol-XL, diltiazem and lisinopril.  She should periodically check her blood pressure at home.    3.  Obstructive sleep apnea - She is compliant on CPAP.     4.  Hyperlipidemia - With starting atorvastatin her LDL has improved and is at goal.  She will continue to have blood work followed on a regular basis.      HPI:  Mrs. Portillo comes in for follow-up regarding her persistent atrial fibrillation along with her history of hypertension.  Kelly followed with [facility] for years given  paroxysmal atrial fibrillation, which was maintained in sinus rhythm on sotalol antiarrhythmic therapy.  Just prior to our last visit she was found to be back in atrial fibrillation.  She was seen during hospitalization last month in which he had recurrent nosebleed and was found to be in rapid ventricular response.  Diltiazem was added to her regimen in addition to her sotalol.  Her heart rates were under better control.  She met with electrophysiology as discussed repeat cardioversion, ongoing antiarrhythmic therapy, ablation and a rate control strategy.    A few years back with atrial fibrillation persisting, and her remaining asymptomatic, we pursued a rate control strategy.  At that point we stopped the sotalol and up titrated her diltiazem.  Kelly wore a extended ambulatory Holter in 2021 which did show periods of rapid atrial fibrillation.  At that point Toprol-XL 50 mg daily was added.  Her heart rates overall have improved.  With each visit we continued to discuss rate versus rhythm control strategies.  However with remaining asymptomatic a rate control strategy has been the best viable option.  After our last visit we did repeat an echocardiogram that showed no significant change.    Fortunately Kelly is for the most part asymptomatic from a cardiac standpoint.  She does tell me about some ongoing feelings of tachycardia that occur twice a month, usually occurring while resting in bed.  About 3 minutes and are not that bothersome to her.  She denies any persistent tachycardia.  No lightheadedness or any syncope.  She denies chest pain or any symptoms of angina.  No shortness of breath and other than some mild edema, likely associated with the diltiazem, no signs of CHF.  She does fisher with chronic fatigue.  She is compliant on CPAP for sleep apnea.      Patient Active Problem List   Diagnosis    Epistaxis    Primary hypertension    Morbid obesity (HCC)    Long term current use of anticoagulant     Allergic rhinitis    Closed nondisplaced fracture of navicular bone of left foot    Type 2 diabetes mellitus with other specified complication (HCC)    Mixed hyperlipidemia    Obesity (BMI 30.0-34.9)    MISHA (obstructive sleep apnea)    Persistent atrial fibrillation (HCC)    Chronic pain syndrome    Chronic bilateral low back pain without sciatica    Lumbar disc disease with radiculopathy    Lumbar spondylosis    Pain in both lower extremities    Lichen sclerosus    Vulvar candidiasis    Dizziness    Cerebral atherosclerosis    Transient ischemic attack (TIA)     Past Medical History:   Diagnosis Date    A-fib (HCC)     BMI 30.0-30.9,adult     Chronic pain     Coronary artery disease 2021    A Fib    Epistaxis     Fibroid     Hypertension     Lumbar spondylosis     Sleep apnea     CPAP    Type 2 DM      Social History     Socioeconomic History    Marital status:      Spouse name: Not on file    Number of children: Not on file    Years of education: Not on file    Highest education level: Not on file   Occupational History    Not on file   Tobacco Use    Smoking status: Never    Smokeless tobacco: Never   Vaping Use    Vaping status: Never Used   Substance and Sexual Activity    Alcohol use: Yes     Comment: socially    Drug use: No    Sexual activity: Not Currently     Partners: Male     Birth control/protection: Female Sterilization   Other Topics Concern    Not on file   Social History Narrative    Not on file     Social Determinants of Health     Financial Resource Strain: Not on file   Food Insecurity: Not on file   Transportation Needs: Not on file   Physical Activity: Not on file   Stress: Not on file   Social Connections: Not on file   Intimate Partner Violence: Not on file   Housing Stability: Not on file      Family History   Problem Relation Age of Onset    Diabetes Mother     Diabetes Father     Atrial fibrillation Father     Heart attack Father     Diabetes Paternal Grandmother         Daughter     Breast cancer Neg Hx     Uterine cancer Neg Hx     Ovarian cancer Neg Hx     Colon cancer Neg Hx      Past Surgical History:   Procedure Laterality Date    COLONOSCOPY  2020    no further    DILATION AND EVACUATION      DXA PROCEDURE (HISTORICAL)      MAMMO (HISTORICAL)      2018    TOTAL ABDOMINAL HYSTERECTOMY W/ BILATERAL SALPINGOOPHORECTOMY  1994    Fibroids - old records reviewed       Current Outpatient Medications:     aspirin 81 mg chewable tablet, Chew 1 tablet (81 mg total) daily Do not start before November 28, 2023., Disp: 30 tablet, Rfl: 0    atorvastatin (LIPITOR) 40 mg tablet, Take 1 tablet (40 mg total) by mouth daily with dinner, Disp: 30 tablet, Rfl: 0    betamethasone dipropionate (DIPROSONE) 0.05 % cream, Pea sized external application twice daily for 3 weeks, then daily for 3 weeks, then every other day., Disp: 30 g, Rfl: 2    Blood Glucose Monitoring Suppl (ONE TOUCH ULTRA 2) w/Device KIT, use as directed by prescriber twice a day, Disp: , Rfl:     Clobetasol Prop Emollient Base 0.05 % emollient cream, 1 Application Every 12 hours, Disp: , Rfl:     diltiazem (CARDIZEM CD) 240 mg 24 hr capsule, Take 1 capsule (240 mg total) by mouth daily, Disp: 90 capsule, Rfl: 3    Lancets (OneTouch Delica Plus [id number]) MISC, use as directed by prescriber twice a day, Disp: , Rfl:     lisinopril (ZESTRIL) 20 mg tablet, Take 1 tablet (20 mg total) by mouth daily, Disp: 90 tablet, Rfl: 3    metoprolol succinate (TOPROL-XL) 50 mg 24 hr tablet, Take 1 tablet (50 mg total) by mouth 2 (two) times a day, Disp: 180 tablet, Rfl: 3    nystatin (MYCOSTATIN) cream, Pea sized external application 1-2 times daily as needed., Disp: 30 g, Rfl: 3    OneTouch Ultra test strip, use as directed by prescriber twice a day, Disp: , Rfl:     rivaroxaban (Xarelto) 20 mg tablet, Take 1 tablet (20 mg total) by mouth daily, Disp: 90 tablet, Rfl: 3    saxagliptin (ONGLYZA) 2.5 MG tablet, Take 2.5 mg by mouth daily, Disp: , Rfl:  "  Allergies   Allergen Reactions    Codeine     Other Other (See Comments)    Sulfa Antibiotics      Vitals:    08/26/24 1010   BP: 138/74   BP Location: Left arm   Patient Position: Sitting   Cuff Size: Standard   Pulse: 90   Weight: 85.3 kg (188 lb)   Height: 5' 4\" (1.626 m)       Labs:  Lab Results   Component Value Date    K 4.3 05/08/2024     05/08/2024    CO2 26 05/08/2024    BUN 11 05/08/2024    CREATININE 0.71 05/08/2024    CALCIUM 9.1 05/08/2024     Lab Results   Component Value Date    WBC 5.24 11/27/2023    HGB 15.3 11/27/2023    HCT 47.0 (H) 11/27/2023    MCV 91 11/27/2023     11/27/2023     Lab Results   Component Value Date    TRIG 65 05/08/2024    HDL 44 (L) 05/08/2024     Imaging:  ECG today shows atrial fibrillation with a controlled ventricular response.    ECHO (8/2/2023):    Left Ventricle: Left ventricular cavity size is normal. Wall thickness is mildly increased. The left ventricular ejection fraction is 65%. Systolic function is normal. Wall motion is normal. Abnormal diastolic inflow patterns due to atrial fibrillation.    Left Atrium: The atrium is severely dilated.    Right Atrium: The atrium is moderately dilated.    Aortic Valve: The aortic valve is trileaflet. The leaflets are moderately thickened. The leaflets are mildly calcified. There is mildly reduced mobility. There is very mild stenosis.    Mitral Valve: There is moderate annular calcification. There is mild to moderate regurgitation.    Tricuspid Valve: There is mild regurgitation.    Aorta: The aortic root is normal in size. The ascending aorta is mildly dilated.    Pulmonary Artery: The estimated pulmonary artery systolic pressure is 40.0 mmHg. The pulmonary artery systolic pressure is mildly increased.      Review of Systems:  Review of Systems   Constitutional:  Positive for fatigue.   HENT: Negative.     Eyes: Negative.    Respiratory: Negative.     Cardiovascular:  Positive for leg swelling.   Gastrointestinal: " "Negative.    Musculoskeletal: Negative.    Skin: Negative.    Allergic/Immunologic: Negative.    Neurological: Negative.    Hematological: Negative.    Psychiatric/Behavioral: Negative.     All other systems reviewed and are negative.    Vitals:    08/26/24 1010   BP: 138/74   BP Location: Left arm   Patient Position: Sitting   Cuff Size: Standard   Pulse: 90   Weight: 85.3 kg (188 lb)   Height: 5' 4\" (1.626 m)     Physical Exam  Vitals and nursing note reviewed.   Constitutional:       Appearance: She is well-developed.   HENT:      Head: Normocephalic and atraumatic.   Eyes:      General: No scleral icterus.        Right eye: No discharge.         Left eye: No discharge.      Pupils: Pupils are equal, round, and reactive to light.   Neck:      Thyroid: No thyromegaly.      Vascular: No JVD.   Cardiovascular:      Rate and Rhythm: Normal rate. Rhythm irregularly irregular. No extrasystoles are present.     Pulses: Normal pulses. No decreased pulses.      Heart sounds: Normal heart sounds, S1 normal and S2 normal. No murmur heard.     No friction rub. No gallop.   Pulmonary:      Effort: Pulmonary effort is normal. No respiratory distress.      Breath sounds: Normal breath sounds. No wheezing, rhonchi or rales.   Abdominal:      General: Bowel sounds are normal. There is no distension.      Palpations: Abdomen is soft.      Tenderness: There is no abdominal tenderness.   Musculoskeletal:         General: No tenderness or deformity. Normal range of motion.      Cervical back: Normal range of motion and neck supple.   Skin:     General: Skin is warm and dry.      Findings: No rash.   Neurological:      Mental Status: She is alert and oriented to person, place, and time.      Cranial Nerves: No cranial nerve deficit.   Psychiatric:         Thought Content: Thought content normal.         Judgment: Judgment normal.       Counseling / Coordination of Care  Total office time spent today 25 minutes.  Greater than 50% of " total time was spent with the patient and / or family counseling and / or coordination of care.

## 2024-09-03 ENCOUNTER — OFFICE VISIT (OUTPATIENT)
Dept: NEUROLOGY | Facility: CLINIC | Age: 84
End: 2024-09-03
Payer: COMMERCIAL

## 2024-09-03 ENCOUNTER — TELEPHONE (OUTPATIENT)
Dept: SLEEP CENTER | Facility: CLINIC | Age: 84
End: 2024-09-03

## 2024-09-03 VITALS
DIASTOLIC BLOOD PRESSURE: 78 MMHG | OXYGEN SATURATION: 99 % | WEIGHT: 189.4 LBS | HEIGHT: 64 IN | SYSTOLIC BLOOD PRESSURE: 122 MMHG | BODY MASS INDEX: 32.33 KG/M2 | TEMPERATURE: 97.9 F | HEART RATE: 82 BPM

## 2024-09-03 DIAGNOSIS — E11.69 TYPE 2 DIABETES MELLITUS WITH OTHER SPECIFIED COMPLICATION, WITHOUT LONG-TERM CURRENT USE OF INSULIN (HCC): ICD-10-CM

## 2024-09-03 DIAGNOSIS — G45.9 TRANSIENT ISCHEMIC ATTACK (TIA): Primary | ICD-10-CM

## 2024-09-03 DIAGNOSIS — I48.19 PERSISTENT ATRIAL FIBRILLATION (HCC): ICD-10-CM

## 2024-09-03 DIAGNOSIS — I67.2 CEREBRAL ATHEROSCLEROSIS: ICD-10-CM

## 2024-09-03 DIAGNOSIS — G47.33 OSA (OBSTRUCTIVE SLEEP APNEA): ICD-10-CM

## 2024-09-03 PROCEDURE — 99214 OFFICE O/P EST MOD 30 MIN: CPT | Performed by: STUDENT IN AN ORGANIZED HEALTH CARE EDUCATION/TRAINING PROGRAM

## 2024-09-03 PROCEDURE — G2211 COMPLEX E/M VISIT ADD ON: HCPCS | Performed by: STUDENT IN AN ORGANIZED HEALTH CARE EDUCATION/TRAINING PROGRAM

## 2024-09-03 NOTE — Clinical Note
Good morning! Could we please ensure that this patient is scheduled with myself or another provider for management of her MISHA? Uses CPAP, DME is [facility]. Thank you!

## 2024-09-03 NOTE — TELEPHONE ENCOUNTER
----- Message from Denis Mathew DO sent at 9/3/2024 11:03 AM EDT -----  Good morning! Could we please ensure that this patient is scheduled with myself or another provider for management of her MISHA? Uses CPAP, DME is [facility]. Thank you!

## 2024-09-03 NOTE — PROGRESS NOTES
[facility]  Neurology Progress Note  Patient Name: Kelly Portillo     : 1940  MRN: [id number]      Assessment/Plan:    1. Transient ischemic attack (TIA)        2. MISHA (obstructive sleep apnea)        3. Persistent atrial fibrillation (HCC)        4. Type 2 diabetes mellitus with other specified complication, without long-term current use of insulin (HCC)        5. Cerebral atherosclerosis            Kelly is a very pleasant 84-year-old woman with a PMHx of persistent atrial fibrillation on Xarelto, HTN, T2DM, MISHA on CPAP, obesity, HLD, chronic pain syndrome who presents in follow up for suspected posterior circulation TIA.  She is overall doing well, with no new concerns or symptoms today.    Recommended she continue the aspirin, Xarelto, and Lipitor for secondary stroke prevention  As stated previously, while data does not show a significant increased benefit for stroke reduction with antiplatelet plus anticoagulant therapy but does show an increased bleed risk, thus typically 1 1 is on anticoagulation antiplatelet is not needed, in her case specifically she has very extensive and fairly severe posterior circulation atherosclerotic disease.  Given this plus the fact that her symptoms sound very concerning for a posterior circulation TIA, and the potential catastrophic consequences of a posterior circulation infarct, I do feel that continuing her on aspirin 81 mg to prevent thrombotic clot formation in addition to the Xarelto is merited in her case.  Reviewed lifestyle factors as primary stroke prevention  Recommended regular exercise (as tolerated and safe) to help with cardiovascular and cerebrovascular health  Recommended that she continue utilizing CPAP to treat her MISHA  Per her request, I will reach out to my team to look into her being scheduled with myself or one of my colleagues in the [facility] sleep medicine department.  She will Return if symptoms worsen or fail to  improve.        ________________________________________________________________________________________________    Per Last Visit Note (Date: 2/8/2024):  Kelly is a very pleasant 83 year old with PMH persistent atrial fibrillation on Xarelto, HTN, T2DM, MISHA on CPAP, obesity, HLD, chronic pain syndrome presenting for hospital follow-up after being seen by our team 11/25-11/27/2023.  Based on her description of the events and chart review, my suspicion is highest for a posterior circulation transient ischemic attack as the cause of her symptoms prior to her hospitalization, with a lower suspicion for a transient vestibular neuronitis or similar process.  I have a very low suspicion for BPPV, as I would have expected this to return, and the pattern does not quite fit this as well as the other 2 possibilities.  Given that vestibular neuronitis is typically short-lived and has a low chance of recurrence, and in light of her extensive atherosclerotic/cerebrovascular disease in the posterior circulation, I feel that approaching this as a posterior circulation TIA with appropriate stroke prevention strategies is necessary.     We had a long discussion regarding the pathophysiology of ischemic stroke, and the reasoning behind the medications that she is taking.  I reviewed important lifestyle factors that, when well-controlled, contribute to primary stroke prevention, including blood pressure, blood sugar, and blood cholesterol control.  I encouraged her to continue to utilize her CPAP for control of her MISHA.  From a secondary stroke prevention standpoint, I agree she should continue the Xarelto for the atrial fibrillation, as well as the Lipitor.  We did discuss that given she is having some side effects to the Lipitor, she could discuss with her PCP possibly trialing alternative statin medications, such as Crestor or Pravachol  Regarding the aspirin: While data does not show a significant increased benefit for stroke  "reduction with antiplatelet plus anticoagulant therapy but does show an increased bleed risk, thus typically 1 1 is on anticoagulation antiplatelet is not needed, in her case specifically she has very extensive and fairly severe posterior circulation atherosclerotic disease.  Given this plus the fact that her symptoms sound very concerning for a posterior circulation TIA, and the potential catastrophic consequences of a posterior circulation infarct, I do feel that continuing her on aspirin 81 mg to prevent thrombotic clot formation in addition to the Xarelto is merited in her case.  We discussed that this could be adjusted in the future if needed should she develop significant side effects, however she has tolerated the combination quite well thus far.  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     Focal moderate to high-grade plaque stenosis right intracranial vertebral artery.     Multifocal high-grade near occlusive plaque stenosis intracranial left vertebral artery.     5mm craniocaudal segment of moderate to severe plaque stenosis mid basilar artery        MRI brain without contrast 11/26/2023:  IMPRESSION:     No diffusion abnormality identified to suggest recent ischemia.      ________________________________________________________________________________________________    Subjective:      Kelly Portillo is a 83 y.o. female with a PMHx of persistent atrial fibrillation on Xarelto, HTN, T2DM, MISHA on CPAP, obesity, HLD, chronic pain syndrome who presents in follow up for suspected posterior circulation TIA.    No new issues or symptoms. Still has the \"creaking\" in her neck, which is annoying but not painful. No dizzy spells. Still doesn't have blood sugar yet under control; just changed medication ~3 months ago.     Did have metoprolol increased by cardiology " "last week. Still using CPAP, DME: [facility]. Not seeing a Sleep medicine physician currently.       Objective:  Current Outpatient Medications   Medication Sig Dispense Refill    aspirin 81 mg chewable tablet Chew 1 tablet (81 mg total) daily Do not start before November 28, 2023. 30 tablet 0    atorvastatin (LIPITOR) 40 mg tablet Take 1 tablet (40 mg total) by mouth daily with dinner 30 tablet 0    betamethasone dipropionate (DIPROSONE) 0.05 % cream Pea sized external application twice daily for 3 weeks, then daily for 3 weeks, then every other day. 30 g 2    Blood Glucose Monitoring Suppl (ONE TOUCH ULTRA 2) w/Device KIT use as directed by prescriber twice a day      Clobetasol Prop Emollient Base 0.05 % emollient cream 1 Application Every 12 hours      diltiazem (CARDIZEM CD) 240 mg 24 hr capsule Take 1 capsule (240 mg total) by mouth daily 90 capsule 3    Lancets (OneTouch Delica Plus [id number]) MISC use as directed by prescriber twice a day      lisinopril (ZESTRIL) 20 mg tablet Take 1 tablet (20 mg total) by mouth daily 90 tablet 3    metoprolol succinate (TOPROL-XL) 50 mg 24 hr tablet Take 1 tablet (50 mg total) by mouth 2 (two) times a day 180 tablet 3    nystatin (MYCOSTATIN) cream Pea sized external application 1-2 times daily as needed. 30 g 3    OneTouch Ultra test strip use as directed by prescriber twice a day      rivaroxaban (Xarelto) 20 mg tablet Take 1 tablet (20 mg total) by mouth daily 90 tablet 3    saxagliptin (ONGLYZA) 2.5 MG tablet Take 2.5 mg by mouth daily       No current facility-administered medications for this visit.       Vital Signs:  /78 (BP Location: Right arm, Patient Position: Sitting, Cuff Size: Adult)   Pulse 82   Temp 97.9 °F (36.6 °C) (Temporal)   Ht 5' 4\" (1.626 m)   Wt 85.9 kg (189 lb 6.4 oz)   SpO2 99%   BMI 32.51 kg/m²      General: A&O x 4, NAD, cooperative  HEENT: NC/AT, EOMI, PERRL, mmm, no carotid bruits, neck supple  Chest: No evidence of respiratory " distress, no accessory muscle use; no evidence of peripheral cyanosis  Extremities: no edema, no calf tenderness b/l    Neurological Exam:         Mental Status:  A&O x 4, NAD, speech clear, language fluent, comprehension intact, repetition and naming intact     Cranial Nerves:  CN II-XII intact     Sensation: intact LT/temp UE/LE's b/l     Motor: 5/5 UE/LE's b/l, tone and bulk normal, no pronator drift     Reflexes: 2/4 biceps, triceps, brachioradialis, patellar, and achilles bilaterally; flexor plantar responses bilaterally     Coordination:       Tremors-- None       Rapidly alternating movements (GENIA): Dysmetria neither side                               Dysdiadonchokinesis neither side      Heel-Shin:Right-- Normal        Left-- Normal      Finger-Nose: Right-- Normal   Left-- Normal     Gait/Station: Deferred            Electronically signed by:    Denis Mathew DO  Board-Certified Neurology and Sleep Medicine  [facility]  09/03/24

## 2024-09-03 NOTE — PATIENT INSTRUCTIONS
Continue Aspirin , Xarelto (rivaroxaban), and atorvastatin for secondary stroke prevention  Avoid or quit smoking, limit/moderate alcohol use, and keep your blood pressure, blood sugars/diabetes, and cholesterol under control  Recommend regular exercise to help with cardiovascular and cerebrovascular health  Recommend maintaining a healthy weight  Continue utilizing your CPAP to treat your obstructive sleep apnea, as this has been affiliated with an increased risk of stroke, as well as other cerebrovascular/cardiovascular diseases such as heart attack, heart failure, and cardiac arrhythmias (abnormal heart rhythms).  I will reach out to my team to look into having you scheduled with myself or one of my coleagues in the Sleep Medicine department.

## 2024-10-02 ENCOUNTER — APPOINTMENT (OUTPATIENT)
Dept: LAB | Facility: CLINIC | Age: 84
End: 2024-10-02
Payer: COMMERCIAL

## 2024-10-02 DIAGNOSIS — E78.2 MIXED HYPERLIPIDEMIA: ICD-10-CM

## 2024-10-02 DIAGNOSIS — I48.19 ATRIAL FIBRILLATION, PERSISTENT (HCC): ICD-10-CM

## 2024-10-02 DIAGNOSIS — E11.69 CONTROLLED TYPE 2 DIABETES MELLITUS WITH OTHER SPECIFIED COMPLICATION, WITHOUT LONG-TERM CURRENT USE OF INSULIN (HCC): ICD-10-CM

## 2024-10-02 DIAGNOSIS — I10 ESSENTIAL (PRIMARY) HYPERTENSION: ICD-10-CM

## 2024-10-02 LAB
ALBUMIN SERPL BCG-MCNC: 4 G/DL (ref 3.5–5)
ALP SERPL-CCNC: 42 U/L (ref 34–104)
ALT SERPL W P-5'-P-CCNC: 20 U/L (ref 7–52)
ANION GAP SERPL CALCULATED.3IONS-SCNC: 9 MMOL/L (ref 4–13)
AST SERPL W P-5'-P-CCNC: 17 U/L (ref 13–39)
BILIRUB SERPL-MCNC: 0.84 MG/DL (ref 0.2–1)
BUN SERPL-MCNC: 11 MG/DL (ref 5–25)
CALCIUM SERPL-MCNC: 8.6 MG/DL (ref 8.4–10.2)
CHLORIDE SERPL-SCNC: 105 MMOL/L (ref 96–108)
CHOLEST SERPL-MCNC: 104 MG/DL
CO2 SERPL-SCNC: 27 MMOL/L (ref 21–32)
CREAT SERPL-MCNC: 0.75 MG/DL (ref 0.6–1.3)
CREAT UR-MCNC: 101.5 MG/DL
EST. AVERAGE GLUCOSE BLD GHB EST-MCNC: 174 MG/DL
GFR SERPL CREATININE-BSD FRML MDRD: 72 ML/MIN/1.73SQ M
GLUCOSE P FAST SERPL-MCNC: 200 MG/DL (ref 65–99)
HBA1C MFR BLD: 7.7 %
HDLC SERPL-MCNC: 42 MG/DL
LDLC SERPL CALC-MCNC: 47 MG/DL (ref 0–100)
MICROALBUMIN UR-MCNC: 23.8 MG/L
MICROALBUMIN/CREAT 24H UR: 23 MG/G CREATININE (ref 0–30)
NONHDLC SERPL-MCNC: 62 MG/DL
POTASSIUM SERPL-SCNC: 4.1 MMOL/L (ref 3.5–5.3)
PROT SERPL-MCNC: 6.5 G/DL (ref 6.4–8.4)
SODIUM SERPL-SCNC: 141 MMOL/L (ref 135–147)
TRIGL SERPL-MCNC: 73 MG/DL

## 2024-10-02 PROCEDURE — 36415 COLL VENOUS BLD VENIPUNCTURE: CPT

## 2024-10-02 PROCEDURE — 80053 COMPREHEN METABOLIC PANEL: CPT

## 2024-10-02 PROCEDURE — 82043 UR ALBUMIN QUANTITATIVE: CPT

## 2024-10-02 PROCEDURE — 80061 LIPID PANEL: CPT

## 2024-10-02 PROCEDURE — 83036 HEMOGLOBIN GLYCOSYLATED A1C: CPT

## 2024-10-02 PROCEDURE — 82570 ASSAY OF URINE CREATININE: CPT

## 2025-03-13 ENCOUNTER — APPOINTMENT (OUTPATIENT)
Dept: LAB | Facility: CLINIC | Age: 85
End: 2025-03-13
Payer: COMMERCIAL

## 2025-03-13 DIAGNOSIS — I10 ESSENTIAL HYPERTENSION, MALIGNANT: ICD-10-CM

## 2025-03-13 DIAGNOSIS — E11.69 DIABETES MELLITUS ASSOCIATED WITH HORMONAL ETIOLOGY (HCC): ICD-10-CM

## 2025-03-13 DIAGNOSIS — E78.2 MIXED HYPERLIPIDEMIA: ICD-10-CM

## 2025-03-13 DIAGNOSIS — R79.0 CALCIUM BLOOD DECREASED: ICD-10-CM

## 2025-03-13 LAB
ALBUMIN SERPL BCG-MCNC: 4.2 G/DL (ref 3.5–5)
ALP SERPL-CCNC: 55 U/L (ref 34–104)
ALT SERPL W P-5'-P-CCNC: 20 U/L (ref 7–52)
ANION GAP SERPL CALCULATED.3IONS-SCNC: 11 MMOL/L (ref 4–13)
AST SERPL W P-5'-P-CCNC: 19 U/L (ref 13–39)
BILIRUB SERPL-MCNC: 0.88 MG/DL (ref 0.2–1)
BUN SERPL-MCNC: 13 MG/DL (ref 5–25)
CALCIUM SERPL-MCNC: 9.4 MG/DL (ref 8.4–10.2)
CHLORIDE SERPL-SCNC: 101 MMOL/L (ref 96–108)
CHOLEST SERPL-MCNC: 112 MG/DL (ref ?–200)
CO2 SERPL-SCNC: 27 MMOL/L (ref 21–32)
CREAT SERPL-MCNC: 0.76 MG/DL (ref 0.6–1.3)
EST. AVERAGE GLUCOSE BLD GHB EST-MCNC: 252 MG/DL
GFR SERPL CREATININE-BSD FRML MDRD: 71 ML/MIN/1.73SQ M
GLUCOSE P FAST SERPL-MCNC: 308 MG/DL (ref 65–99)
HBA1C MFR BLD: 10.4 %
HDLC SERPL-MCNC: 40 MG/DL
LDLC SERPL CALC-MCNC: 54 MG/DL (ref 0–100)
MAGNESIUM SERPL-MCNC: 1.6 MG/DL (ref 1.9–2.7)
NONHDLC SERPL-MCNC: 72 MG/DL
POTASSIUM SERPL-SCNC: 4.3 MMOL/L (ref 3.5–5.3)
PROT SERPL-MCNC: 6.7 G/DL (ref 6.4–8.4)
SODIUM SERPL-SCNC: 139 MMOL/L (ref 135–147)
TRIGL SERPL-MCNC: 92 MG/DL (ref ?–150)

## 2025-03-13 PROCEDURE — 83036 HEMOGLOBIN GLYCOSYLATED A1C: CPT

## 2025-03-13 PROCEDURE — 80061 LIPID PANEL: CPT

## 2025-03-13 PROCEDURE — 36415 COLL VENOUS BLD VENIPUNCTURE: CPT

## 2025-03-13 PROCEDURE — 80053 COMPREHEN METABOLIC PANEL: CPT

## 2025-03-13 PROCEDURE — 83735 ASSAY OF MAGNESIUM: CPT

## 2025-03-28 ENCOUNTER — OFFICE VISIT (OUTPATIENT)
Dept: CARDIOLOGY CLINIC | Facility: CLINIC | Age: 85
End: 2025-03-28
Payer: COMMERCIAL

## 2025-03-28 VITALS
BODY MASS INDEX: 32.61 KG/M2 | HEART RATE: 74 BPM | SYSTOLIC BLOOD PRESSURE: 124 MMHG | HEIGHT: 64 IN | DIASTOLIC BLOOD PRESSURE: 76 MMHG | WEIGHT: 191 LBS

## 2025-03-28 DIAGNOSIS — I48.19 PERSISTENT ATRIAL FIBRILLATION (HCC): Primary | ICD-10-CM

## 2025-03-28 DIAGNOSIS — G47.33 OSA (OBSTRUCTIVE SLEEP APNEA): ICD-10-CM

## 2025-03-28 DIAGNOSIS — I10 PRIMARY HYPERTENSION: ICD-10-CM

## 2025-03-28 DIAGNOSIS — E78.2 MIXED HYPERLIPIDEMIA: ICD-10-CM

## 2025-03-28 PROCEDURE — 99214 OFFICE O/P EST MOD 30 MIN: CPT | Performed by: INTERNAL MEDICINE

## 2025-03-28 PROCEDURE — 93000 ELECTROCARDIOGRAM COMPLETE: CPT | Performed by: INTERNAL MEDICINE

## 2025-03-28 RX ORDER — TRIAMCINOLONE ACETONIDE 1 MG/G
CREAM TOPICAL
COMMUNITY
Start: 2025-02-24

## 2025-03-28 RX ORDER — DILTIAZEM HYDROCHLORIDE 120 MG/1
120 CAPSULE, COATED, EXTENDED RELEASE ORAL DAILY
Qty: 90 CAPSULE | Refills: 3 | Status: SHIPPED | OUTPATIENT
Start: 2025-03-28

## 2025-03-28 RX ORDER — LANOLIN ALCOHOL/MO/W.PET/CERES
CREAM (GRAM) TOPICAL
COMMUNITY
Start: 2025-03-14

## 2025-03-28 NOTE — PROGRESS NOTES
Cardiology Follow-up    Kelly Portillo  [id number]  1940  [facility] [place]  [address]  [facility] [id number] [phone]    1. Persistent atrial fibrillation (HCC)  POCT ECG    diltiazem (CARDIZEM CD) 120 mg 24 hr capsule      2. Primary hypertension        3. MISHA (obstructive sleep apnea)        4. Mixed hyperlipidemia            Discussion/Summary:    1.  Persistent atrial fibrillation - I have discussed with Kelly in the past both a rate and rhythm control strategy.  I stated that I would not recommend ablation, given her age, lack of symptoms and significant biatrial enlargement.  A cardioversion is an option however antiarrhythmic therapy would need to be restarted.  Over the last several visits we have discussed risks and benefits of a rhythm versus rate control strategy.  She chose a rate control strategy which I feel is fine.  She is now asymptomatic.  She is having some edema of her lower extremities and I am going to try cutting the diltiazem down to 120 mg daily and continue Toprol-XL at 50 mg twice daily.  She remains on Xarelto for stroke prevention.  We will see her back in 6 months.    2.  Hypertension - Her blood pressure is under good control with Toprol-XL, diltiazem and lisinopril.  She should periodically check her blood pressure at home.    3.  Obstructive sleep apnea - She is compliant on CPAP.     4.  Hyperlipidemia - With starting atorvastatin her LDL has improved and is at goal.  She will continue to have blood work followed on a regular basis.      HPI:  Mrs. Portillo comes in for follow-up regarding her persistent atrial fibrillation along with her history of hypertension.  Kelly followed with [facility] for years given paroxysmal atrial fibrillation, which was maintained in sinus rhythm on sotalol antiarrhythmic therapy.  Just prior to our last visit she was  found to be back in atrial fibrillation.  She was seen during hospitalization last month in which he had recurrent nosebleed and was found to be in rapid ventricular response.  Diltiazem was added to her regimen in addition to her sotalol.  Her heart rates were under better control.  She met with electrophysiology as discussed repeat cardioversion, ongoing antiarrhythmic therapy, ablation and a rate control strategy.    A few years back with atrial fibrillation persisting, and her remaining asymptomatic, we pursued a rate control strategy.  At that point we stopped the sotalol and up titrated her diltiazem.  Kelly wore a extended ambulatory Holter in 2021 which did show periods of rapid atrial fibrillation.  At that point Toprol-XL 50 mg daily was added and her heart rates improved.  With each visit we continued to discuss rate versus rhythm control strategies.  However with remaining asymptomatic a rate control strategy has been the best viable option.  2 years ago we did repeat an echocardiogram that showed no significant change.  At our last visit Kelly was feeling some symptoms of her atrial fibrillation when tachycardic.  We increased her Toprol-XL to 2 times daily and it has helped.    Kelly is asymptomatic from a cardiac standpoint.  She denies any recent palpitations or feelings of tachycardia.  No lightheadedness or any syncope.  She denies chest pain or any symptoms of angina.  She denies any shortness of breath.  She is having some edema of her lower extremity likely from the diltiazem, which has been chronic but a bit worse lately.  She does fisher with chronic fatigue.  She is compliant on CPAP for sleep apnea.      Patient Active Problem List   Diagnosis    Epistaxis    Primary hypertension    Morbid obesity (HCC)    Long term current use of anticoagulant    Allergic rhinitis    Closed nondisplaced fracture of navicular bone of left foot    Type 2 diabetes mellitus with other specified  complication (HCC)    Mixed hyperlipidemia    Obesity (BMI 30.0-34.9)    MISHA (obstructive sleep apnea)    Persistent atrial fibrillation (HCC)    Chronic pain syndrome    Chronic bilateral low back pain without sciatica    Lumbar disc disease with radiculopathy    Lumbar spondylosis    Pain in both lower extremities    Lichen sclerosus    Vulvar candidiasis    Dizziness    Cerebral atherosclerosis    Transient ischemic attack (TIA)     Past Medical History:   Diagnosis Date    A-fib (HCC)     BMI 30.0-30.9,adult     Chronic pain     Coronary artery disease 2021    A Fib    Epistaxis     Fibroid     Hypertension     Lumbar spondylosis     Sleep apnea     CPAP    Type 2 DM      Social History     Socioeconomic History    Marital status:      Spouse name: Not on file    Number of children: Not on file    Years of education: Not on file    Highest education level: Not on file   Occupational History    Not on file   Tobacco Use    Smoking status: Never    Smokeless tobacco: Never   Vaping Use    Vaping status: Never Used   Substance and Sexual Activity    Alcohol use: Yes     Comment: socially    Drug use: No    Sexual activity: Not Currently     Partners: Male     Birth control/protection: Female Sterilization   Other Topics Concern    Not on file   Social History Narrative    Not on file     Social Drivers of Health     Financial Resource Strain: Not on file   Food Insecurity: Not on file   Transportation Needs: Not on file   Physical Activity: Not on file   Stress: Not on file   Social Connections: Not on file   Intimate Partner Violence: Not on file   Housing Stability: Not on file      Family History   Problem Relation Age of Onset    Diabetes Mother     Diabetes Father     Atrial fibrillation Father     Heart attack Father     Diabetes Paternal Grandmother         Daughter    Breast cancer Neg Hx     Uterine cancer Neg Hx     Ovarian cancer Neg Hx     Colon cancer Neg Hx      Past Surgical History:    Procedure Laterality Date    COLONOSCOPY  2020    no further    DILATION AND EVACUATION      DXA PROCEDURE (HISTORICAL)      MAMMO (HISTORICAL)      2018    TOTAL ABDOMINAL HYSTERECTOMY W/ BILATERAL SALPINGOOPHORECTOMY  1994    Fibroids - old records reviewed       Current Outpatient Medications:     aspirin 81 mg chewable tablet, Chew 1 tablet (81 mg total) daily Do not start before November 28, 2023., Disp: 30 tablet, Rfl: 0    atorvastatin (LIPITOR) 40 mg tablet, Take 1 tablet (40 mg total) by mouth daily with dinner, Disp: 30 tablet, Rfl: 0    diltiazem (CARDIZEM CD) 120 mg 24 hr capsule, Take 1 capsule (120 mg total) by mouth daily, Disp: 90 capsule, Rfl: 3    lisinopril (ZESTRIL) 20 mg tablet, Take 1 tablet (20 mg total) by mouth daily, Disp: 90 tablet, Rfl: 3    magnesium Oxide (MAG-OX) 400 mg TABS, , Disp: , Rfl:     metoprolol succinate (TOPROL-XL) 50 mg 24 hr tablet, Take 1 tablet (50 mg total) by mouth 2 (two) times a day, Disp: 180 tablet, Rfl: 3    rivaroxaban (Xarelto) 20 mg tablet, Take 1 tablet (20 mg total) by mouth daily, Disp: 90 tablet, Rfl: 3    saxagliptin (ONGLYZA) 2.5 MG tablet, Take 2.5 mg by mouth daily, Disp: , Rfl:     triamcinolone (KENALOG) 0.1 % cream, , Disp: , Rfl:     betamethasone dipropionate (DIPROSONE) 0.05 % cream, Pea sized external application twice daily for 3 weeks, then daily for 3 weeks, then every other day., Disp: 30 g, Rfl: 2    Blood Glucose Monitoring Suppl (ONE TOUCH ULTRA 2) w/Device KIT, use as directed by prescriber twice a day, Disp: , Rfl:     Clobetasol Prop Emollient Base 0.05 % emollient cream, 1 Application Every 12 hours, Disp: , Rfl:     Lancets (OneTouch Delica Plus [id number]) MISC, use as directed by prescriber twice a day, Disp: , Rfl:     nystatin (MYCOSTATIN) cream, Pea sized external application 1-2 times daily as needed., Disp: 30 g, Rfl: 3    OneTouch Ultra test strip, use as directed by prescriber twice a day, Disp: , Rfl:   Allergies  "  Allergen Reactions    Benzonatate Throat Swelling    Codeine     Empagliflozin Other (See Comments)    Other Other (See Comments)    Sulfa Antibiotics Other (See Comments)     Vitals:    03/28/25 1058   BP: 124/76   BP Location: Left arm   Patient Position: Sitting   Cuff Size: Standard   Pulse: 74   Weight: 86.6 kg (191 lb)   Height: 5' 4\" (1.626 m)       Labs:  Lab Results   Component Value Date    K 4.3 03/13/2025     03/13/2025    CO2 27 03/13/2025    BUN 13 03/13/2025    CREATININE 0.76 03/13/2025    CALCIUM 9.4 03/13/2025     Lab Results   Component Value Date    WBC 5.24 11/27/2023    HGB 15.3 11/27/2023    HCT 47.0 (H) 11/27/2023    MCV 91 11/27/2023     11/27/2023     Lab Results   Component Value Date    TRIG 92 03/13/2025    HDL 40 (L) 03/13/2025     Imaging:  ECG today shows atrial fibrillation with a controlled ventricular response.    ECHO (8/2/2023):    Left Ventricle: Left ventricular cavity size is normal. Wall thickness is mildly increased. The left ventricular ejection fraction is 65%. Systolic function is normal. Wall motion is normal. Abnormal diastolic inflow patterns due to atrial fibrillation.    Left Atrium: The atrium is severely dilated.    Right Atrium: The atrium is moderately dilated.    Aortic Valve: The aortic valve is trileaflet. The leaflets are moderately thickened. The leaflets are mildly calcified. There is mildly reduced mobility. There is very mild stenosis.    Mitral Valve: There is moderate annular calcification. There is mild to moderate regurgitation.    Tricuspid Valve: There is mild regurgitation.    Aorta: The aortic root is normal in size. The ascending aorta is mildly dilated.    Pulmonary Artery: The estimated pulmonary artery systolic pressure is 40.0 mmHg. The pulmonary artery systolic pressure is mildly increased.      Review of Systems:  Review of Systems   Constitutional:  Positive for fatigue.   HENT: Negative.     Eyes: Negative.    Respiratory: " "Negative.     Cardiovascular:  Positive for leg swelling.   Gastrointestinal: Negative.    Musculoskeletal: Negative.    Skin: Negative.    Allergic/Immunologic: Negative.    Neurological: Negative.    Hematological: Negative.    Psychiatric/Behavioral: Negative.     All other systems reviewed and are negative.    Vitals:    25 1058   BP: 124/76   BP Location: Left arm   Patient Position: Sitting   Cuff Size: Standard   Pulse: 74   Weight: 86.6 kg (191 lb)   Height: 5' 4\" (1.626 m)     Physical Exam  Vitals and nursing note reviewed.   Constitutional:       Appearance: She is well-developed.   HENT:      Head: Normocephalic and atraumatic.   Eyes:      General: No scleral icterus.        Right eye: No discharge.         Left eye: No discharge.      Pupils: Pupils are equal, round, and reactive to light.   Neck:      Thyroid: No thyromegaly.      Vascular: No JVD.   Cardiovascular:      Rate and Rhythm: Normal rate. Rhythm irregularly irregular.      Pulses: Normal pulses. No decreased pulses.      Heart sounds: Normal heart sounds, S1 normal and S2 normal. No murmur heard.     No friction rub. No gallop.   Pulmonary:      Effort: Pulmonary effort is normal. No respiratory distress.      Breath sounds: Normal breath sounds. No wheezing, rhonchi or rales.   Abdominal:      General: Bowel sounds are normal. There is no distension.      Palpations: Abdomen is soft.      Tenderness: There is no abdominal tenderness.   Musculoskeletal:         General: No tenderness or deformity. Normal range of motion.      Cervical back: Normal range of motion and neck supple.      Right lower le+ Pitting Edema present.      Left lower le+ Pitting Edema present.   Skin:     General: Skin is warm and dry.      Findings: No rash.   Neurological:      Mental Status: She is alert and oriented to person, place, and time.      Cranial Nerves: No cranial nerve deficit.   Psychiatric:         Thought Content: Thought content " normal.         Judgment: Judgment normal.     Counseling / Coordination of Care  Total office time spent today 25 minutes.  Greater than 50% of total time was spent with the patient and / or family counseling and / or coordination of care.

## 2025-04-29 ENCOUNTER — APPOINTMENT (OUTPATIENT)
Dept: RADIOLOGY | Facility: CLINIC | Age: 85
End: 2025-04-29
Attending: PHYSICIAN ASSISTANT
Payer: COMMERCIAL

## 2025-04-29 ENCOUNTER — OFFICE VISIT (OUTPATIENT)
Dept: URGENT CARE | Facility: CLINIC | Age: 85
End: 2025-04-29
Payer: COMMERCIAL

## 2025-04-29 VITALS
OXYGEN SATURATION: 98 % | DIASTOLIC BLOOD PRESSURE: 80 MMHG | TEMPERATURE: 98.3 F | HEART RATE: 82 BPM | BODY MASS INDEX: 32.61 KG/M2 | WEIGHT: 191 LBS | SYSTOLIC BLOOD PRESSURE: 120 MMHG | RESPIRATION RATE: 18 BRPM | HEIGHT: 64 IN

## 2025-04-29 DIAGNOSIS — S89.91XA INJURY OF RIGHT KNEE, INITIAL ENCOUNTER: ICD-10-CM

## 2025-04-29 DIAGNOSIS — S89.91XA INJURY OF RIGHT KNEE, INITIAL ENCOUNTER: Primary | ICD-10-CM

## 2025-04-29 PROCEDURE — S9083 URGENT CARE CENTER GLOBAL: HCPCS | Performed by: PHYSICIAN ASSISTANT

## 2025-04-29 PROCEDURE — G0382 LEV 3 HOSP TYPE B ED VISIT: HCPCS | Performed by: PHYSICIAN ASSISTANT

## 2025-04-29 PROCEDURE — 73564 X-RAY EXAM KNEE 4 OR MORE: CPT

## 2025-05-06 DIAGNOSIS — M25.562 CHRONIC PAIN OF LEFT KNEE: Primary | ICD-10-CM

## 2025-05-06 DIAGNOSIS — G89.29 CHRONIC PAIN OF LEFT KNEE: Primary | ICD-10-CM

## 2025-05-07 ENCOUNTER — APPOINTMENT (OUTPATIENT)
Dept: RADIOLOGY | Facility: CLINIC | Age: 85
End: 2025-05-07
Payer: COMMERCIAL

## 2025-05-07 DIAGNOSIS — M25.562 CHRONIC PAIN OF LEFT KNEE: ICD-10-CM

## 2025-05-07 DIAGNOSIS — G89.29 CHRONIC PAIN OF LEFT KNEE: ICD-10-CM

## 2025-05-07 PROCEDURE — 73560 X-RAY EXAM OF KNEE 1 OR 2: CPT

## 2025-05-08 ENCOUNTER — OFFICE VISIT (OUTPATIENT)
Dept: OBGYN CLINIC | Facility: CLINIC | Age: 85
End: 2025-05-08
Payer: COMMERCIAL

## 2025-05-08 ENCOUNTER — APPOINTMENT (OUTPATIENT)
Dept: RADIOLOGY | Facility: CLINIC | Age: 85
End: 2025-05-08
Attending: ORTHOPAEDIC SURGERY
Payer: COMMERCIAL

## 2025-05-08 ENCOUNTER — TELEPHONE (OUTPATIENT)
Age: 85
End: 2025-05-08

## 2025-05-08 VITALS — BODY MASS INDEX: 31.76 KG/M2 | WEIGHT: 186 LBS | HEIGHT: 64 IN

## 2025-05-08 DIAGNOSIS — M25.572 PAIN, JOINT, ANKLE AND FOOT, LEFT: ICD-10-CM

## 2025-05-08 DIAGNOSIS — S80.02XA HEMATOMA OF LEFT KNEE REGION: ICD-10-CM

## 2025-05-08 DIAGNOSIS — S80.02XA CONTUSION OF LEFT KNEE, INITIAL ENCOUNTER: Primary | ICD-10-CM

## 2025-05-08 PROCEDURE — 99204 OFFICE O/P NEW MOD 45 MIN: CPT | Performed by: ORTHOPAEDIC SURGERY

## 2025-05-08 PROCEDURE — 73610 X-RAY EXAM OF ANKLE: CPT

## 2025-05-08 NOTE — TELEPHONE ENCOUNTER
Caller: Daughter-Halle    Doctor: Dr. Hernandez    Reason for call: They went to medical supplier to get a compression stocking and were told they need to know how tight of a compression is needed (10-20 or 20-30)-please advise    Call back#: [phone]

## 2025-05-08 NOTE — PROGRESS NOTES
Assessment:     1. Contusion of left knee, initial encounter    2. Hematoma of left knee region    3. Pain, joint, ankle and foot, left        Plan:     Problem List Items Addressed This Visit          Blood    Hematoma of left knee region    Findings are consistent with left knee contusion with hematoma. Findings and treatment options discussed with patient.  X-rays of the left knee and left ankle were reviewed and radiologist report discussed with patient. Patient has a hematoma around the knee related to her fall. Discussed with patient getting a compression stocking to help with swelling. Discussed to help with circulation, patient should alternate warm and cold compress. Order placed for compression stocking at today's visit.  Discussed with patient using a walker or cane to assist with ambulation until feeling better.  Discussed with patient the importance of gentle knee range of motion to prevent stiffness, along with gentle ankle range of motion to prove prevent stiffness as well.  Follow-up in 6 weeks. All patient's questions were answered to her satisfaction.  This note is created using dictation transcription.  It may contain typographical errors, grammatical errors, improperly dictated words, background noise and other errors.         Relevant Orders    Compression Stocking       Surgery/Wound/Pain    Contusion of left knee - Primary     Other Visit Diagnoses         Pain, joint, ankle and foot, left        Relevant Orders    XR ankle 3+ vw left           Subjective:     Patient ID: Kelly Portillo is a 84 y.o. female.  Chief Complaint:  84 y.o. female presents today for evaluation for left knee pain.  She reports about 2 weeks ago tripping on a curb, which caused her to fall onto her left knee and left side.  She waited a few days, but went to urgent care and was seen by Mike Barrientos PA-C.  She has been ambulating with out a walker or cane.  She has significant ecchymosis present over the posterior  aspect of her left leg.  She has limited range of motion due to swelling in the knee.  She has diffuse pain down her leg all the way to her ankle.  She does have lateral ankle pain.    Allergy:  Allergies   Allergen Reactions    Benzonatate Throat Swelling    Codeine     Empagliflozin Other (See Comments)    Other Other (See Comments)    Sulfa Antibiotics Other (See Comments)     Medications:  all current active meds have been reviewed  Past Medical History:  Past Medical History:   Diagnosis Date    A-fib (HCC)     BMI 30.0-30.9,adult     Chronic pain     Coronary artery disease 2021    A Fib    Epistaxis     Fibroid     Hypertension     Lumbar spondylosis     Sleep apnea     CPAP    Type 2 DM      Past Surgical History:  Past Surgical History:   Procedure Laterality Date    COLONOSCOPY  2020    no further    DILATION AND EVACUATION      DXA PROCEDURE (HISTORICAL)      MAMMO (HISTORICAL)      2018    TOTAL ABDOMINAL HYSTERECTOMY W/ BILATERAL SALPINGOOPHORECTOMY  1994    Fibroids - old records reviewed     Family History:  Family History   Problem Relation Age of Onset    Diabetes Mother     Diabetes Father     Atrial fibrillation Father     Heart attack Father     Diabetes Paternal Grandmother         Daughter    Breast cancer Neg Hx     Uterine cancer Neg Hx     Ovarian cancer Neg Hx     Colon cancer Neg Hx      Social History:  Social History     Substance and Sexual Activity   Alcohol Use Yes    Comment: socially     Social History     Substance and Sexual Activity   Drug Use No     Social History     Tobacco Use   Smoking Status Never   Smokeless Tobacco Never     Review of Systems   Constitutional:  Negative for chills and fever.   HENT:  Negative for ear pain and sore throat.    Eyes:  Negative for pain and visual disturbance.   Respiratory:  Negative for cough and shortness of breath.    Cardiovascular:  Negative for chest pain and palpitations.   Gastrointestinal:  Negative for abdominal pain and vomiting.  "  Genitourinary:  Negative for dysuria and hematuria.   Musculoskeletal:  Positive for arthralgias (Left knee and lower leg), gait problem (Antalgic) and joint swelling (Left knee and ankle). Negative for back pain.   Skin:  Negative for color change and rash.   Neurological:  Negative for seizures and syncope.   Psychiatric/Behavioral: Negative.     All other systems reviewed and are negative.        Objective:  BP Readings from Last 1 Encounters:   04/29/25 120/80      Wt Readings from Last 1 Encounters:   05/08/25 84.4 kg (186 lb)      BMI:   Estimated body mass index is 31.93 kg/m² as calculated from the following:    Height as of this encounter: 5' 4\" (1.626 m).    Weight as of this encounter: 84.4 kg (186 lb).  BSA:   Estimated body surface area is 1.9 meters squared as calculated from the following:    Height as of this encounter: 5' 4\" (1.626 m).    Weight as of this encounter: 84.4 kg (186 lb).   Physical Exam  Vitals and nursing note reviewed.   Constitutional:       Appearance: Normal appearance. She is well-developed.   HENT:      Head: Normocephalic and atraumatic.      Right Ear: External ear normal.      Left Ear: External ear normal.   Eyes:      General: No scleral icterus.     Extraocular Movements: Extraocular movements intact.      Conjunctiva/sclera: Conjunctivae normal.   Pulmonary:      Effort: Pulmonary effort is normal. No respiratory distress.   Musculoskeletal:      Cervical back: Neck supple.      Left knee: Effusion (Trace) present.      Instability Tests: Medial Ashley test negative and lateral Ashley test negative.      Comments: See Ortho exam   Skin:     General: Skin is warm and dry.   Neurological:      General: No focal deficit present.      Mental Status: She is alert and oriented to person, place, and time.   Psychiatric:         Behavior: Behavior normal.       Left Ankle Exam     Tenderness   Left ankle tenderness location: Diffuse.   Swelling: moderate    Range of Motion "   Dorsiflexion:  10   Plantar flexion:  15   Eversion:  10   Inversion:  15     Muscle Strength   Dorsiflexion:  4/5   Plantar flexion:  4/5   Anterior tibial:  4/5   Posterior tibial:  4/5  Gastrocsoleus:  4/5  Peroneal muscle:  4/5    Tests   Anterior drawer: negative  Varus tilt: negative    Other   Erythema: absent  Scars: absent  Sensation: normal  Pulse: present      Right Knee Exam     Tests   Patellar apprehension: negative      Left Knee Exam     Muscle Strength   The patient has normal left knee strength.    Tenderness   Left knee tenderness location: Diffuse anteriorly.    Range of Motion   Extension:  0   Flexion:  90 (pain)     Tests   Ashley:  Medial - negative Lateral - negative  Varus: negative Valgus: negative  Patellar apprehension: negative    Other   Erythema: present (Anterior shin diffused)  Scars: present (Superficial abrasions anteriorly.  No signs of infection)  Sensation: normal  Pulse: present  Swelling: moderate  Effusion: effusion (Trace) present    Comments:  Significant ecchymosis posterior leg  Calf is soft and nontender  Slight fluctuance over the soft tissue anterior lateral aspect of the knee most consistent with a hematoma            I have personally reviewed pertinent films in PACS and my interpretation is x-rays of the left knee demonstrates no acute fracture or dislocation.  Chondrocalcinosis.  Mild tricompartmental degenerative changes present.  X-rays of the left ankle demonstrates no acute fracture or dislocation.  No osseous abnormalities.    Scribe Attestation      I,:  Kristopher Mckeon am acting as a scribe while in the presence of the attending physician.:       I,:  Haydee Hernandez MD personally performed the services described in this documentation    as scribed in my presence.:

## 2025-05-08 NOTE — ASSESSMENT & PLAN NOTE
Findings are consistent with left knee contusion with hematoma. Findings and treatment options discussed with patient.  X-rays of the left knee and left ankle were reviewed and radiologist report discussed with patient. Patient has a hematoma around the knee related to her fall. Discussed with patient getting a compression stocking to help with swelling. Discussed to help with circulation, patient should alternate warm and cold compress. Order placed for compression stocking at today's visit.  Discussed with patient using a walker or cane to assist with ambulation until feeling better.  Discussed with patient the importance of gentle knee range of motion to prevent stiffness, along with gentle ankle range of motion to prove prevent stiffness as well.  Follow-up in 6 weeks. All patient's questions were answered to her satisfaction.  This note is created using dictation transcription.  It may contain typographical errors, grammatical errors, improperly dictated words, background noise and other errors.

## 2025-07-08 ENCOUNTER — OFFICE VISIT (OUTPATIENT)
Dept: OBGYN CLINIC | Facility: CLINIC | Age: 85
End: 2025-07-08
Payer: COMMERCIAL

## 2025-07-08 VITALS — BODY MASS INDEX: 31.92 KG/M2 | WEIGHT: 187 LBS | HEIGHT: 64 IN

## 2025-07-08 DIAGNOSIS — S80.02XD CONTUSION OF LEFT KNEE, SUBSEQUENT ENCOUNTER: Primary | ICD-10-CM

## 2025-07-08 PROCEDURE — 99213 OFFICE O/P EST LOW 20 MIN: CPT | Performed by: ORTHOPAEDIC SURGERY

## 2025-07-08 NOTE — PROGRESS NOTES
Assessment:     1. Contusion of left knee, subsequent encounter        Plan:     Problem List Items Addressed This Visit          Surgery/Wound/Pain    Contusion of left knee - Primary    Findings are consistent with left knee contusion. Findings and treatment options discussed with patient. No new images at today's visit.  Discussed with patient the continued tenderness to palpation over the lateral proximal tibia is normal at this time.  Discussed she will continue to have the sensitivity for a couple months.  Discussed with patient she can rub the area to help with desensitization, along with using topical Voltaren gel or Aspercreme to help with any residual pain.  Patient can continue to progress activity as tolerated, letting any pain be a guide to her activity.  Patient can use OTC medication for pain.  Follow-up as needed. All patient's questions were answered to her satisfaction.  This note is created using dictation transcription.  It may contain typographical errors, grammatical errors, improperly dictated words, background noise and other errors.           Subjective:     Patient ID: Kelly Portillo is a 84 y.o. female.  Chief Complaint:  84 y.o. female presents today for follow up for left knee contusion. She states she is doing well overall at this time. She reports having improvement in her knee and ankle pain. She notes having some continued mild pain with palpation over the lateral aspect of the proximal tibia. She notes the knee and the ankle will swell on occasion depending on her activity for the day. She overall feels her knee and ankle are continuing to progress. She denies any new trauma or injury.    Allergy:  Allergies[1]  Medications:  all current active meds have been reviewed  Past Medical History:  Past Medical History[2]  Past Surgical History:  Past Surgical History[3]  Family History:  Family History[4]  Social History:  Social History     Substance and Sexual Activity   Alcohol  "Use Yes    Comment: socially     Social History     Substance and Sexual Activity   Drug Use No     Tobacco Use History[5]  Review of Systems   Constitutional:  Negative for chills and fever.   HENT:  Negative for ear pain and sore throat.    Eyes:  Negative for pain and visual disturbance.   Respiratory:  Negative for cough and shortness of breath.    Cardiovascular:  Negative for chest pain and palpitations.   Gastrointestinal:  Negative for abdominal pain and vomiting.   Genitourinary:  Negative for dysuria and hematuria.   Musculoskeletal:  Negative for arthralgias and back pain.   Skin:  Negative for color change and rash.   Neurological:  Negative for seizures and syncope.   Psychiatric/Behavioral: Negative.     All other systems reviewed and are negative.        Objective:  BP Readings from Last 1 Encounters:   04/29/25 120/80      Wt Readings from Last 1 Encounters:   07/08/25 84.8 kg (187 lb)      BMI:   Estimated body mass index is 32.1 kg/m² as calculated from the following:    Height as of this encounter: 5' 4\" (1.626 m).    Weight as of this encounter: 84.8 kg (187 lb).  BSA:   Estimated body surface area is 1.9 meters squared as calculated from the following:    Height as of this encounter: 5' 4\" (1.626 m).    Weight as of this encounter: 84.8 kg (187 lb).   Physical Exam  Vitals and nursing note reviewed.   Constitutional:       Appearance: Normal appearance. She is well-developed.   HENT:      Head: Normocephalic and atraumatic.      Right Ear: External ear normal.      Left Ear: External ear normal.     Eyes:      General: No scleral icterus.     Extraocular Movements: Extraocular movements intact.      Conjunctiva/sclera: Conjunctivae normal.     Pulmonary:      Effort: Pulmonary effort is normal. No respiratory distress.     Musculoskeletal:      Cervical back: Neck supple.      Left knee: No effusion.      Comments: See Ortho exam     Skin:     General: Skin is warm and dry.     Neurological:     "  General: No focal deficit present.      Mental Status: She is alert and oriented to person, place, and time.     Psychiatric:         Behavior: Behavior normal.       Left Ankle Exam     Tenderness   The patient is experiencing no tenderness.   Swelling: none    Range of Motion   Dorsiflexion:  20   Plantar flexion:  30     Muscle Strength   Dorsiflexion:  5/5   Plantar flexion:  5/5     Other   Erythema: absent  Scars: absent  Sensation: normal  Pulse: present      Left Knee Exam     Tenderness   Left knee tenderness location: mild lateral proximal tibia.    Range of Motion   Extension:  0   Flexion:  120     Tests   Varus: negative Valgus: negative  Patellar apprehension: negative    Other   Erythema: absent  Scars: absent  Sensation: normal  Pulse: present  Swelling: none  Effusion: no effusion present           No new images at today's visit.     Scribe Attestation      I,:  Kristopher Mckeon am acting as a scribe while in the presence of the attending physician.:       I,:  Haydee Hernandez MD personally performed the services described in this documentation    as scribed in my presence.:                [1]   Allergies  Allergen Reactions    Benzonatate Throat Swelling    Codeine     Empagliflozin Other (See Comments)    Other Other (See Comments)    Sulfa Antibiotics Other (See Comments)   [2]   Past Medical History:  Diagnosis Date    A-fib ([facility])     BMI 30.0-30.9,adult     Chronic pain     Coronary artery disease 2021    A Fib    Epistaxis     Fibroid     Hypertension     Lumbar spondylosis     Sleep apnea     CPAP    Type 2 DM    [3]   Past Surgical History:  Procedure Laterality Date    COLONOSCOPY  2020    no further    DILATION AND EVACUATION      DXA PROCEDURE (HISTORICAL)      MAMMO (HISTORICAL)      2018    TOTAL ABDOMINAL HYSTERECTOMY W/ BILATERAL SALPINGOOPHORECTOMY  1994    Fibroids - old records reviewed   [4]   Family History  Problem Relation Name Age of Onset    Diabetes Mother Sharee Ingram      Diabetes Father Biju Sell     Atrial fibrillation Father Biju Sell     Heart attack Father Biju Sell     Diabetes Paternal Grandmother Meche Mendoza         Daughter    Breast cancer Neg Hx      Uterine cancer Neg Hx      Ovarian cancer Neg Hx      Colon cancer Neg Hx     [5]   Social History  Tobacco Use   Smoking Status Never   Smokeless Tobacco Never

## 2025-07-08 NOTE — ASSESSMENT & PLAN NOTE
Findings are consistent with left knee contusion. Findings and treatment options discussed with patient. No new images at today's visit.  Discussed with patient the continued tenderness to palpation over the lateral proximal tibia is normal at this time.  Discussed she will continue to have the sensitivity for a couple months.  Discussed with patient she can rub the area to help with desensitization, along with using topical Voltaren gel or Aspercreme to help with any residual pain.  Patient can continue to progress activity as tolerated, letting any pain be a guide to her activity.  Patient can use OTC medication for pain.  Follow-up as needed. All patient's questions were answered to her satisfaction.  This note is created using dictation transcription.  It may contain typographical errors, grammatical errors, improperly dictated words, background noise and other errors.